# Patient Record
Sex: MALE | Race: BLACK OR AFRICAN AMERICAN | NOT HISPANIC OR LATINO | Employment: FULL TIME | URBAN - METROPOLITAN AREA
[De-identification: names, ages, dates, MRNs, and addresses within clinical notes are randomized per-mention and may not be internally consistent; named-entity substitution may affect disease eponyms.]

---

## 2021-07-16 ENCOUNTER — HISTORICAL (OUTPATIENT)
Dept: ADMINISTRATIVE | Facility: HOSPITAL | Age: 31
End: 2021-07-16

## 2022-03-29 ENCOUNTER — HISTORICAL (OUTPATIENT)
Dept: ADMINISTRATIVE | Facility: HOSPITAL | Age: 32
End: 2022-03-29

## 2022-04-11 ENCOUNTER — HISTORICAL (OUTPATIENT)
Dept: ADMINISTRATIVE | Facility: HOSPITAL | Age: 32
End: 2022-04-11

## 2022-04-18 ENCOUNTER — HISTORICAL (OUTPATIENT)
Dept: ADMINISTRATIVE | Facility: HOSPITAL | Age: 32
End: 2022-04-18

## 2022-04-28 VITALS — HEIGHT: 71 IN | WEIGHT: 227.94 LBS | BODY MASS INDEX: 31.91 KG/M2

## 2022-06-13 ENCOUNTER — HOSPITAL ENCOUNTER (EMERGENCY)
Facility: HOSPITAL | Age: 32
Discharge: HOME | End: 2022-06-13
Attending: EMERGENCY MEDICINE

## 2022-06-13 VITALS
TEMPERATURE: 99 F | HEART RATE: 80 BPM | DIASTOLIC BLOOD PRESSURE: 85 MMHG | RESPIRATION RATE: 18 BRPM | OXYGEN SATURATION: 100 % | SYSTOLIC BLOOD PRESSURE: 135 MMHG

## 2022-06-13 DIAGNOSIS — M76.61 ACHILLES TENDINITIS, RIGHT LEG: Primary | ICD-10-CM

## 2022-06-13 DIAGNOSIS — M79.673 HEEL PAIN: ICD-10-CM

## 2022-06-13 PROCEDURE — 99284 EMERGENCY DEPT VISIT MOD MDM: CPT | Mod: 25

## 2022-06-13 RX ORDER — HYDROCODONE BITARTRATE AND ACETAMINOPHEN 10; 325 MG/1; MG/1
1 TABLET ORAL EVERY 6 HOURS PRN
Qty: 20 TABLET | Refills: 0 | Status: SHIPPED | OUTPATIENT
Start: 2022-06-13 | End: 2022-06-18

## 2022-06-13 RX ORDER — MELOXICAM 7.5 MG/1
7.5 TABLET ORAL DAILY
Qty: 14 TABLET | Refills: 0 | Status: SHIPPED | OUTPATIENT
Start: 2022-06-13 | End: 2022-06-27

## 2022-06-13 NOTE — Clinical Note
"Figueroa Downeylucas Murcia was seen and treated in our emergency department on 6/13/2022.  He may return to work on 06/16/2022.       If you have any questions or concerns, please don't hesitate to call.      Silas Gonzalez RN    "

## 2022-06-13 NOTE — ED PROVIDER NOTES
Encounter Date: 6/13/2022       History     Chief Complaint   Patient presents with    Ankle Pain     Pt c/o pain to posterior ankle onset yesterday while playing basketball. States has limited flexion and extension of joint.     The history is provided by the patient. No  was used.   Ankle Pain  The current episode started yesterday. The problem occurs constantly. The problem has been gradually worsening. Pertinent negatives include no chest pain and no shortness of breath. The symptoms are aggravated by standing and walking. The symptoms are relieved by lying down.   Started hurting while playing basketball yesterday.    Review of patient's allergies indicates:  No Known Allergies  No past medical history on file. none  No past surgical history on file. none  No family history on file.     Review of Systems   Constitutional: Negative for fever.   HENT: Negative for sore throat.    Respiratory: Negative for shortness of breath.    Cardiovascular: Negative for chest pain.   Gastrointestinal: Negative for nausea.   Genitourinary: Negative for dysuria.   Musculoskeletal: Negative for back pain.   Skin: Negative for rash.   Neurological: Negative for weakness.   Hematological: Does not bruise/bleed easily.   All other systems reviewed and are negative.      Physical Exam     Initial Vitals   BP Pulse Resp Temp SpO2   -- -- -- -- --      MAP       --         Physical Exam    Nursing note and vitals reviewed.  Constitutional: He appears well-developed and well-nourished.   HENT:   Head: Normocephalic and atraumatic.   Right Ear: External ear normal.   Left Ear: External ear normal.   Nose: Nose normal.   Eyes: Conjunctivae and EOM are normal. Pupils are equal, round, and reactive to light.   Neck: Neck supple.   Normal range of motion.  Cardiovascular: Normal rate, regular rhythm, normal heart sounds and intact distal pulses.   Pulmonary/Chest: Breath sounds normal.   Abdominal: Abdomen is soft.  Bowel sounds are normal.   Musculoskeletal:      Cervical back: Normal range of motion and neck supple.        Legs:       Comments: TTP at insertion of right Achilles tendon; Aquino test negative     Neurological: He is alert and oriented to person, place, and time. He has normal strength. GCS score is 15. GCS eye subscore is 4. GCS verbal subscore is 5. GCS motor subscore is 6.   Skin: Skin is warm and dry. Capillary refill takes less than 2 seconds.   Psychiatric: He has a normal mood and affect. His behavior is normal. Judgment and thought content normal.         ED Course   Procedures  Labs Reviewed - No data to display       Imaging Results          X-Ray Foot Complete Right (Preliminary result)  Result time 06/13/22 11:32:29    ED Interpretation by Grabiel Velázquez MD (06/13/22 11:32:29, [facility] - Emergency Dept, Emergency Medicine)    Unusual appearance of posterior calcaneus; no definite fracture                               Medications - No data to display                       Clinical Impression:   Final diagnoses:  [M79.673] Heel pain  [M76.61] Achilles tendinitis, right leg (Primary)          ED Disposition Condition    Discharge Stable        ED Prescriptions     Medication Sig Dispense Start Date End Date Auth. Provider    meloxicam (MOBIC) 7.5 MG tablet Take 1 tablet (7.5 mg total) by mouth once daily. for 14 days 14 tablet 6/13/2022 6/27/2022 Grabiel Velázquez MD    HYDROcodone-acetaminophen (NORCO)  mg per tablet Take 1 tablet by mouth every 6 (six) hours as needed for Pain. 20 tablet 6/13/2022 6/18/2022 Grabiel Velázquez MD        Follow-up Information     Follow up With Specialties Details Why Contact Info    Follow up with your primary care provider in 2 weeks if not improved               Grabiel Velázquez MD  06/13/22 1111

## 2022-06-13 NOTE — ED TRIAGE NOTES
Pt c/o pain to posterior ankle onset yesterday while playing basketball. States has limited flexion and extension of joint.

## 2022-07-18 ENCOUNTER — HOSPITAL ENCOUNTER (EMERGENCY)
Facility: HOSPITAL | Age: 32
Discharge: HOME | End: 2022-07-18
Attending: STUDENT IN AN ORGANIZED HEALTH CARE EDUCATION/TRAINING PROGRAM

## 2022-07-18 VITALS
OXYGEN SATURATION: 100 % | TEMPERATURE: 99 F | WEIGHT: 220 LBS | SYSTOLIC BLOOD PRESSURE: 159 MMHG | BODY MASS INDEX: 30.8 KG/M2 | HEIGHT: 71 IN | RESPIRATION RATE: 18 BRPM | DIASTOLIC BLOOD PRESSURE: 97 MMHG | HEART RATE: 79 BPM

## 2022-07-18 DIAGNOSIS — F10.920 ALCOHOLIC INTOXICATION WITHOUT COMPLICATION: ICD-10-CM

## 2022-07-18 DIAGNOSIS — S41.112A ARM LACERATION, LEFT, INITIAL ENCOUNTER: Primary | ICD-10-CM

## 2022-07-18 PROBLEM — F43.0 ACUTE STRESS REACTION: Status: ACTIVE | Noted: 2022-07-18

## 2022-07-18 PROBLEM — T71.132A: Status: ACTIVE | Noted: 2022-07-18

## 2022-07-18 PROCEDURE — 12032 INTMD RPR S/A/T/EXT 2.6-7.5: CPT

## 2022-07-18 PROCEDURE — 99282 EMERGENCY DEPT VISIT SF MDM: CPT | Mod: 25

## 2022-07-18 RX ORDER — LIDOCAINE HYDROCHLORIDE 10 MG/ML
10 INJECTION, SOLUTION EPIDURAL; INFILTRATION; INTRACAUDAL; PERINEURAL
Status: ACTIVE | OUTPATIENT
Start: 2022-07-18 | End: 2022-07-18

## 2022-07-18 NOTE — SUBJECTIVE & OBJECTIVE
Patient information was obtained from patient and ER records.  Patient presented not under certificate to the Emergency Department by ambulance where the patient received N/A prior to arrival.    Psychiatric History:   Hospitalization: No  Medication Trials: No  Suicide Attempts: no  Violence: No  Depression: No  Kellen: No  AH's: No  Delusions: No    Past Medical History:   Past Medical History:   Diagnosis Date    At risk for alteration in comfort     Obesity, unspecified     Tobacco abuse         Seizures: No  Head trauma/l.o.c.: No  Wish to become pregnant[if female of childbearing age]: N/A    Allergies: NKDA  Review of patient's allergies indicates:  No Known Allergies    Medications in ER:   Medications   LIDOcaine (PF) 10 mg/ml (1%) injection 100 mg (has no administration in time range)       Medications at home: N/A    Substance Abuse History:   Alchohol: Binge drinks  Drug: Denies     Legal History:   Past charges/incarcerations: Denies  Pending charges: Denie; recent ticket for issue with his vehicle.    Family Psychiatric History: Unknown    Social History:   History of Physical/Sexual Abuse: No  Education: Left HS in 12th grade    Employment/Disability: Works at [employer]   Financial: Employed  Relationship Status/Sexual Orientation: Heterosexual   Children: 1   Housing Status: Lives alone at his home  Islam: N/A   History: None   Recreational Activities: Hangs with friends  Access to Gun: No     Review of Systems   Constitutional: Negative.    HENT: Negative.     Eyes: Negative.    Respiratory: Negative.     Cardiovascular: Negative.    Gastrointestinal: Negative.    Endocrine: Negative.    Genitourinary: Negative.    Musculoskeletal: Negative.    Skin:  Positive for wound.   Allergic/Immunologic: Negative.    Neurological: Negative.    Hematological: Negative.    Psychiatric/Behavioral:  Positive for self-injury. Negative for confusion, decreased concentration, dysphoric mood, hallucinations,  sleep disturbance and suicidal ideas. The patient is not nervous/anxious.    Objective:   Vitals:   Vitals:    07/18/22 1400   BP: (!) 145/93   Pulse: 63   Resp:    Temp:        Physical Exam    Psychiatric  Level of Consciousness: alert  Orientation: oriented to person, place and time  Grooming: in hospital gown  Psychomotor Behavior: no agitation  Speech: normal in rate, rhythm and volume  Language: uses words appropriately  Mood: Euthymic, remorseful  Affect: Normal  Thought Process: Goal directed  Associations: No delusions  Thought Content: No AVH.  No delusional thinking.  No SI or HI.  Memory: Intact, immediate, remote, and recent  Attention: intact to interview  Fund of Knowledge: appears adequate  Insight: Good  Judgement: Poor    Relevant Elements of Neurological Exam: no abnormality of posture noted    Laboratory Data: Labs Reviewed - No data to display

## 2022-07-18 NOTE — DISCHARGE INSTRUCTIONS
Do not drink alcohol.     You will need your stitches out in 7-10 days.     Return to the emergency department if any worsening pain, nausea, vomiting, redness, swelling, fever, or any other symptoms.

## 2022-07-18 NOTE — Clinical Note
"Figueroa Downeylucas Murcia was seen and treated in our emergency department on 7/18/2022.  He may return to work on 07/19/2022.       If you have any questions or concerns, please don't hesitate to call.      KASIA Rivera RN    "

## 2022-07-18 NOTE — CONSULTS
"[facility]  Psychiatry    Please see previous notes:    Visit was in person in the ED.      The chief complaint leading to psychiatric consultation is: "I had too much alcohol to drink."  This consultation was requested by the Emergency Department attending physician.    The patient location is  [facility] EMERGENCY DEPARTMENT     Patient Identification:   Figueroa Murcia is a 32 y.o. male.    Patient information was obtained from patient and ER records.  Patient presented voluntarily to the Emergency Department by ambulance where the patient received N/A prior to arrival.    Inpatient consult to Telemedicine - Psychiatry  Consult performed by: GUILLERMO Briscoe  Consult ordered by: Igor Marte MD  Assessment/Recommendations: Psychiatry Recommendations:  1.  No PEC required at this time.  2.  Patient to be discharged to home to f/u with [facility] this Thursday and he should also establish care with [facility] this week.  3.  I spoke to Mr. Murcia about impulsive choices and decisions.  Recommended that he needs coping skills.  4.  Encouraged him to return to ED for any SI.  5.  He will be staying at his cousin Mentcle's house post discharge.        Teleconsult Time Documentation  Subjective:     History of Present Illness:  32 year old AA male presented to ED via ambulance after he self harmed by cutting his left upper arm that required sutures.  Mr. Murcia reports that he had a horrible day that day.  He says that his truch broke and he also got a ticket right before his truck broke.  He reports that he had been drinking heavily that day worrying about how he was going to get his truch fixed since that is what he uses to go to work.  He says that he went to a party and drank Abigail and Tequila.  He says that he mixed a lot of liquor that day.  He says that by the time he got home, he was still drinking and cut himself.  He says that he was not trying to kill himself nor does he have " any desires to be dead.  He denies HI.  I spoke with his cousin, Calderon, with Figueroa's permission.  Calderon said that Figueroa actually called him and told him what he did.  He said that he made it to his house and called EMS.  Calderon says that he does not ever recall Figueroa having any emotional issues or mental illness.  He says that he has never done anything remotely close to this before.  Figueroa denies a hx of depression, anxiety, bipolar, impulse control.  He says that it was the excessive alcohol that made him act out.  He says that he is now afraid to drink because of what he did.  On today, he denies depression.  He denies SI or HI.  He denies anxiety.  He says that he knows that he made a very stupid decision in what he did.  He advised me that he does not have any desire to drink.  He denies AVH and delusional thinking.  He says that he plans on going back to work tomorrow. He says that he has an appt at [facility] this Thursday and he will keep his appt.       Psychiatric History:   Previous Psychiatric Hospitalizations: No   Previous Medication Trials: No   Previous Suicide Attempts: no   History of Violence: No  History of Depression: No  History of Kellen: No  History of Auditory/Visual Hallucination No  History of Delusions: No  Outpatient psychiatrist (current & past): No    Substance Abuse History:  Tobacco:Yes  Alcohol: Yes  Illicit Substances:No  Detox/Rehab: No    Legal History: Past charges/incarcerations: Tickets     Family Psychiatric History: No known      Social History:  Developmental/Childhood:Achieved all developmental milestones timely  *Education:Left school in 12th grade  Employment Status/Finances:Employed   Relationship Status/Sexual Orientation: Single:  Not currently in a relationship  Children: 1  Housing Status: Home    history:  NO  Access to gun: NO  Confucianist:N/A  Recreational activities:Time with family    Psychiatric Mental Status Exam:  Arousal: alert  Sensorium/Orientation: oriented  "to grossly intact  Behavior/Cooperation: normal, cooperative   Speech: normal tone, normal rate, normal pitch, normal volume  Language: grossly intact  Mood: " Euthymic, remorseful "   Affect: appropriate  Thought Process: normal and logical  Thought Content:   Auditory hallucinations: NO  Visual hallucinations: NO  Paranoia: NO  Delusions:  NO  Suicidal ideation: NO  Homicidal ideation: NO  Attention/Concentration:  intact  Memory:    Recent:  Intact   Remote: Intact   3/3 immediate, 3/3 at 5 min  Fund of Knowledge: Aware of current events   Abstract reasoning: proverbs were abstract  Insight: intact  Judgment: behavior is adequate to circumstances      Past Medical History:   Past Medical History:   Diagnosis Date    At risk for alteration in comfort     Obesity, unspecified     Tobacco abuse       Laboratory Data: Labs Reviewed - No data to display    Neurological History:  Seizures: No  Head trauma: No    Allergies: NKDA  Review of patient's allergies indicates:  No Known Allergies    Medications in ER:   Medications   LIDOcaine (PF) 10 mg/ml (1%) injection 100 mg (has no administration in time range)       Medications at home: N/A    Patient information was obtained from patient and ER records.  Patient presented not under certificate to the Emergency Department by ambulance where the patient received N/A prior to arrival.    Psychiatric History:   Hospitalization: No  Medication Trials: No  Suicide Attempts: no  Violence: No  Depression: No  Kellen: No  AH's: No  Delusions: No    Past Medical History:   Past Medical History:   Diagnosis Date    At risk for alteration in comfort     Obesity, unspecified     Tobacco abuse         Seizures: No  Head trauma/l.o.c.: No  Wish to become pregnant[if female of childbearing age]: N/A    Allergies: NKDA  Review of patient's allergies indicates:  No Known Allergies    Medications in ER:   Medications   LIDOcaine (PF) 10 mg/ml (1%) injection 100 mg (has no " administration in time range)       Medications at home: N/A    Substance Abuse History:   Alchohol: Binge drinks  Drug: Denies     Legal History:   Past charges/incarcerations: Denies  Pending charges: Denie; recent ticket for issue with his vehicle.    Family Psychiatric History: Unknown    Social History:   History of Physical/Sexual Abuse: No  Education: Left HS in 12th grade    Employment/Disability: Works at [employer]   Financial: Employed  Relationship Status/Sexual Orientation: Heterosexual   Children: 1   Housing Status: Lives alone at his home  Jainism: N/A   History: None   Recreational Activities: Hangs with friends  Access to Gun: No     Review of Systems   Constitutional: Negative.    HENT: Negative.     Eyes: Negative.    Respiratory: Negative.     Cardiovascular: Negative.    Gastrointestinal: Negative.    Endocrine: Negative.    Genitourinary: Negative.    Musculoskeletal: Negative.    Skin:  Positive for wound.   Allergic/Immunologic: Negative.    Neurological: Negative.    Hematological: Negative.    Psychiatric/Behavioral:  Positive for self-injury. Negative for confusion, decreased concentration, dysphoric mood, hallucinations, sleep disturbance and suicidal ideas. The patient is not nervous/anxious.    Objective:   Vitals:   Vitals:    07/18/22 1400   BP: (!) 145/93   Pulse: 63   Resp:    Temp:        Physical Exam    Psychiatric  Level of Consciousness: alert  Orientation: oriented to person, place and time  Grooming: in hospital gown  Psychomotor Behavior: no agitation  Speech: normal in rate, rhythm and volume  Language: uses words appropriately  Mood: Euthymic, remorseful  Affect: Normal  Thought Process: Goal directed  Associations: No delusions  Thought Content: No AVH.  No delusional thinking.  No SI or HI.  Memory: Intact, immediate, remote, and recent  Attention: intact to interview  Fund of Knowledge: appears adequate  Insight: Good  Judgement: Poor    Relevant Elements of  Neurological Exam: no abnormality of posture noted    Laboratory Data: Labs Reviewed - No data to display      Assessment - Diagnosis - Goals:     Diagnosis/Impression: Acute Stress Reaction; Self harm    Rec: Psychiatry Recommendations:  1.  No PEC required at this time.  2.  Patient to be discharged to home to f/u with [facility] this Thursday and he should also establish care with [facility] this week.  3.  I spoke to Mr. Murcia about impulsive choices and decisions.  Recommended that he needs coping skills.  4.  Encouraged him to return to ED for any SI.  5.  He will be going to his cousin, rao Mancera post discharge; confirmed this with Calderon.     Time with patient: 60 minutes      More than 50% of the time was spent counseling/coordinating care    Consulting clinician was informed of the encounter and consult note.    SUHAIL Briscoe-ARMIDA   Psychiatry  [facility]

## 2022-07-18 NOTE — PROGRESS NOTES
Dr. Marte asked for an update on what time patient will be evaluated by [facility]. Jodi Andrade said that she would check with the NP and let me know.

## 2022-07-18 NOTE — HPI
32 year old AA male presented to ED via ambulance after he self harmed by cutting his left upper arm that required sutures.  Mr. Murcia reports that he had a horrible day that day.  He says that his truch broke and he also got a ticket right before his truck broke.  He reports that he had been drinking heavily that day worrying about how he was going to get his truch fixed since that is what he uses to go to work.  He says that he went to a party and drank Abigail and Tequila.  He says that he mixed a lot of liquor that day.  He says that by the time he got home, he was still drinking and cut himself.  He says that he was not trying to kill himself nor does he have any desires to be dead.  He denies HI.  I spoke with his cousin, Calderon, with Figueroa's permission.  Calderon said that Figueroa actually called him and told him what he did.  He said that he made it to his house and called EMS.  Calderon says that he does not ever recall Figueroa having any emotional issues or mental illness.  He says that he has never done anything remotely close to this before.  Figueroa denies a hx of depression, anxiety, bipolar, impulse control.  He says that it was the excessive alcohol that made him act out.  He says that he is now afraid to drink because of what he did.  On today, he denies depression.  He denies SI or HI.  He denies anxiety.  He says that he knows that he made a very stupid decision in what he did.  He advised me that he does not have any desire to drink.  He denies AVH and delusional thinking.  He says that he plans on going back to work tomorrow. He says that he has an appt at [facility] this Thursday and he will keep his appt.
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
## 2022-07-21 ENCOUNTER — OFFICE VISIT (OUTPATIENT)
Dept: INTERNAL MEDICINE | Facility: CLINIC | Age: 32
End: 2022-07-21

## 2022-07-21 VITALS
BODY MASS INDEX: 32.2 KG/M2 | TEMPERATURE: 98 F | HEART RATE: 71 BPM | HEIGHT: 71 IN | RESPIRATION RATE: 16 BRPM | WEIGHT: 230 LBS | SYSTOLIC BLOOD PRESSURE: 132 MMHG | DIASTOLIC BLOOD PRESSURE: 82 MMHG

## 2022-07-21 DIAGNOSIS — Z11.59 NEED FOR HEPATITIS C SCREENING TEST: ICD-10-CM

## 2022-07-21 DIAGNOSIS — Z00.00 WELLNESS EXAMINATION: Primary | ICD-10-CM

## 2022-07-21 DIAGNOSIS — Z11.4 SCREENING FOR HIV (HUMAN IMMUNODEFICIENCY VIRUS): ICD-10-CM

## 2022-07-21 DIAGNOSIS — F17.210 CIGARETTE NICOTINE DEPENDENCE WITHOUT COMPLICATION: ICD-10-CM

## 2022-07-21 DIAGNOSIS — R45.4 IRRITABILITY AND ANGER: Chronic | ICD-10-CM

## 2022-07-21 DIAGNOSIS — Z11.3 SCREENING FOR STD (SEXUALLY TRANSMITTED DISEASE): ICD-10-CM

## 2022-07-21 PROCEDURE — 99203 OFFICE O/P NEW LOW 30 MIN: CPT | Mod: S$PBB,,, | Performed by: NURSE PRACTITIONER

## 2022-07-21 PROCEDURE — 99214 OFFICE O/P EST MOD 30 MIN: CPT | Mod: PBBFAC | Performed by: NURSE PRACTITIONER

## 2022-07-21 PROCEDURE — 99203 PR OFFICE/OUTPT VISIT, NEW, LEVL III, 30-44 MIN: ICD-10-PCS | Mod: S$PBB,,, | Performed by: NURSE PRACTITIONER

## 2022-07-21 RX ORDER — PROMETHAZINE HYDROCHLORIDE AND DEXTROMETHORPHAN HYDROBROMIDE 6.25; 15 MG/5ML; MG/5ML
5 SYRUP ORAL EVERY 6 HOURS PRN
COMMUNITY
Start: 2022-04-18 | End: 2022-07-21 | Stop reason: ALTCHOICE

## 2022-07-21 NOTE — PROGRESS NOTES
SUHAIL Crowder   [facility]  [address] [facility] [address]      PATIENT NAME: Figueroa Murcia  : 1990  DATE: 22  MRN: [id number]      Billing Provider: SUHAIL Crowder  Level of Service:   Patient PCP Information     Provider PCP Type    SUHAIL Crowder General          Reason for Visit / Chief Complaint: Establish Care       History of Present Illness / Problem Focused Workflow     Figueroa Murcia presents to the clinic with Establish Care     33 yo AAM here to establish care. PMH Anger Issues, THC Use, & tobacco use (4 ciggs / day). Pt chart review does show some anger issues with violent acts  / self harm noted. The pt reprots today with no SI/HI or thoughts of harming himself, he reports he is going to [facility] after our OV for anger management. Reports with some right achilles tenderness at time when he tries to run, injured it about 3 months ago, discussed therapies to help. Pt denies any other acute issues today, he is agreeable to Wellness labs today and f/u next week.       Review of Systems     Review of Systems   Constitutional: Negative.    HENT: Negative.    Eyes: Negative.    Respiratory: Negative.    Cardiovascular: Negative.    Gastrointestinal: Negative.    Endocrine: Negative.    Genitourinary: Negative.    Neurological: Negative.    Psychiatric/Behavioral: Negative.        Medical / Social / Family History     Past Medical History:   Diagnosis Date    At risk for alteration in comfort     Obesity, unspecified     Tobacco abuse        Past Surgical History:   Procedure Laterality Date    arm repair Right        Social History    reports that he has been smoking cigarettes. He has never used smokeless tobacco. He reports current alcohol use. He reports current drug use. Drug: Marijuana.    Family History  's family history includes Hypertension in his mother; No Known Problems in his  father.    Medications and Allergies     Medications  Medication List with Changes/Refills   Current Medications    PROMETHAZINE-DEXTROMETHORPHAN (PROMETHAZINE-DM) 6.25-15 MG/5 ML SYRP    Take 5 mLs by mouth every 6 (six) hours as needed.        Allergies  Review of patient's allergies indicates:  No Known Allergies    Physical Examination     Vitals:    07/21/22 0925   BP: 132/82   Pulse: 71   Resp: 16   Temp: 97.7 °F (36.5 °C)     Physical Exam  Vitals reviewed.   Constitutional:       Appearance: Normal appearance. He is normal weight.   HENT:      Head: Normocephalic.   Cardiovascular:      Rate and Rhythm: Normal rate and regular rhythm.      Pulses: Normal pulses.      Heart sounds: Normal heart sounds.   Pulmonary:      Effort: Pulmonary effort is normal.      Breath sounds: Normal breath sounds.   Abdominal:      General: Abdomen is flat.      Palpations: Abdomen is soft.   Musculoskeletal:         General: Normal range of motion.      Cervical back: Normal range of motion.   Skin:     General: Skin is warm and dry.   Neurological:      Mental Status: He is alert.   Psychiatric:         Mood and Affect: Mood normal.           Results         Assessment and Plan (including Health Maintenance)     Plan:         Health Maintenance Due   Topic Date Due    Hepatitis C Screening  Never done    Lipid Panel  Never done    COVID-19 Vaccine (1) Never done    Pneumococcal Vaccines (Age 0-64) (1 - PCV) Never done    HIV Screening  Never done    TETANUS VACCINE  Never done       Problem List Items Addressed This Visit    None     Visit Diagnoses     Wellness examination    -  Primary          Health Maintenance Topics with due status: Not Due       Topic Last Completion Date    Influenza Vaccine 11/12/2009       No future appointments.         Signature:     [facility]  [address] [facility] [address]    Date of encounter: 7/21/22

## 2022-07-21 NOTE — ASSESSMENT & PLAN NOTE
Smoking cessation discussed >3 mins  Pt ready to quit.  Referral to Smoking Cessation   Discussed benefits of quitting including improved health, decreased cardiac/vascular/pulmonary/stroke risks as well as saving money.

## 2022-07-27 ENCOUNTER — HOSPITAL ENCOUNTER (EMERGENCY)
Facility: HOSPITAL | Age: 32
Discharge: HOME | End: 2022-07-27
Attending: STUDENT IN AN ORGANIZED HEALTH CARE EDUCATION/TRAINING PROGRAM

## 2022-07-27 ENCOUNTER — OFFICE VISIT (OUTPATIENT)
Dept: INTERNAL MEDICINE | Facility: CLINIC | Age: 32
End: 2022-07-27

## 2022-07-27 VITALS
SYSTOLIC BLOOD PRESSURE: 136 MMHG | OXYGEN SATURATION: 96 % | DIASTOLIC BLOOD PRESSURE: 71 MMHG | BODY MASS INDEX: 32.93 KG/M2 | WEIGHT: 230 LBS | RESPIRATION RATE: 18 BRPM | HEART RATE: 69 BPM | TEMPERATURE: 98 F | HEIGHT: 70 IN

## 2022-07-27 DIAGNOSIS — Z00.00 WELLNESS EXAMINATION: ICD-10-CM

## 2022-07-27 DIAGNOSIS — Z48.02 VISIT FOR SUTURE REMOVAL: Primary | ICD-10-CM

## 2022-07-27 DIAGNOSIS — E55.9 VITAMIN D DEFICIENCY: Primary | ICD-10-CM

## 2022-07-27 PROCEDURE — 99281 EMR DPT VST MAYX REQ PHY/QHP: CPT

## 2022-07-27 PROCEDURE — 99214 OFFICE O/P EST MOD 30 MIN: CPT | Mod: 95,,, | Performed by: NURSE PRACTITIONER

## 2022-07-27 PROCEDURE — 99214 PR OFFICE/OUTPT VISIT, EST, LEVL IV, 30-39 MIN: ICD-10-PCS | Mod: 95,,, | Performed by: NURSE PRACTITIONER

## 2022-07-27 RX ORDER — ERGOCALCIFEROL 1.25 MG/1
50000 CAPSULE ORAL
Qty: 8 CAPSULE | Refills: 0 | Status: ON HOLD | OUTPATIENT
Start: 2022-07-27 | End: 2022-10-21 | Stop reason: ALTCHOICE
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
## 2022-07-27 NOTE — PROGRESS NOTES
SUHAIL Crowder   [facility]  [address] [facility] [address]      PATIENT NAME: Figueroa Murcia  : 1990  DATE: 22  MRN: [id number]      Billing Provider: SUHAIL Crowdre  Level of Service:   Patient PCP Information     Provider PCP Type    SUHAIL Crowder General          Reason for Visit / Chief Complaint: Follow-up (Lab review)       History of Present Illness / Problem Focused Workflow     Figueroa Murcia presents to the clinic with Follow-up (Lab review)     33 yo AAM here via audio virtual for f/u. PMH Anger Issues, THC Use, & tobacco use (4 ciggs / day). Followed by [facility]. Pt reports he has an appt with [facility] in September. Labs reviewed with no questions or concerns at this time, discussed low vitamin d level with the patient, is agreeable to RX vitamin D then starting OTC. Agreeable to 1 year f/u for wellness with labs. Denies any acute issues or concerns today.       Review of Systems     Review of Systems   Constitutional: Negative.    HENT: Negative.    Eyes: Negative.    Respiratory: Negative.    Cardiovascular: Negative.    Gastrointestinal: Negative.    Endocrine: Negative.    Genitourinary: Negative.    Neurological: Negative.    Psychiatric/Behavioral: Negative.        Medical / Social / Family History     Past Medical History:   Diagnosis Date    At risk for alteration in comfort     Obesity, unspecified     Tobacco abuse        Past Surgical History:   Procedure Laterality Date    arm repair Right        Social History    reports that he has been smoking cigarettes. He has never used smokeless tobacco. He reports current alcohol use. He reports current drug use. Drug: Marijuana.    Family History  's family history includes Hypertension in his mother; No Known Problems in his father.    Medications and Allergies     Medications        Allergies  Review of patient's allergies indicates:  No  Known Allergies    Physical Examination   There were no vitals filed for this visit.  Physical Exam  HENT:      Right Ear: Hearing normal.      Left Ear: Hearing normal.   Neurological:      Mental Status: He is alert and oriented to person, place, and time.   Psychiatric:         Mood and Affect: Mood normal.           Results     Lab Results   Component Value Date    WBC 8.2 07/21/2022    RBC 5.87 07/21/2022    HGB 17.0 07/21/2022    HCT 50.1 07/21/2022    MCV 85.3 07/21/2022    MCH 29.0 07/21/2022    MCHC 33.9 07/21/2022    RDW 11.9 07/21/2022     07/21/2022    MPV 9.7 07/21/2022     Sodium Level   Date Value Ref Range Status   07/21/2022 138 136 - 145 mmol/L Final     Potassium Level   Date Value Ref Range Status   07/21/2022 3.9 3.5 - 5.1 mmol/L Final     Carbon Dioxide   Date Value Ref Range Status   07/21/2022 32 (H) 22 - 29 mmol/L Final     Blood Urea Nitrogen   Date Value Ref Range Status   07/21/2022 10.2 8.9 - 20.6 mg/dL Final     Creatinine   Date Value Ref Range Status   07/21/2022 1.10 0.73 - 1.18 mg/dL Final     Calcium Level Total   Date Value Ref Range Status   07/21/2022 9.9 8.4 - 10.2 mg/dL Final     Albumin Level   Date Value Ref Range Status   07/21/2022 4.0 3.5 - 5.0 gm/dL Final     Bilirubin Total   Date Value Ref Range Status   07/21/2022 1.4 <=1.5 mg/dL Final     Alkaline Phosphatase   Date Value Ref Range Status   07/21/2022 72 40 - 150 unit/L Final     Aspartate Aminotransferase   Date Value Ref Range Status   07/21/2022 18 5 - 34 unit/L Final     Alanine Aminotransferase   Date Value Ref Range Status   07/21/2022 18 0 - 55 unit/L Final     Estimated GFR-Non    Date Value Ref Range Status   03/29/2022 >60       Lab Results   Component Value Date    CHOL 201 (H) 07/21/2022     Lab Results   Component Value Date    HDL 42 07/21/2022     No results found for: LDLCALC  Lab Results   Component Value Date    TRIG 93 07/21/2022     No results found for: CHOLHDL  Lab Results    Component Value Date    TSH 0.6284 07/21/2022     Lab Results   Component Value Date    PHUR 7.0 09/18/2021    PROTEINUA Negative 07/21/2022    GLUCUA Negative 09/18/2021    KETONESU Negative 09/18/2021    OCCULTUA Negative 09/18/2021    NITRITE Negative 09/18/2021    LEUKOCYTESUR Negative 07/21/2022     Lab Results   Component Value Date    HGBA1C 5.1 07/21/2022     No results found for: MICROALBUR, [id number]   No results found for this or any previous visit.         Assessment and Plan (including Health Maintenance)     Plan:         Health Maintenance Due   Topic Date Due    Pneumococcal Vaccines (Age 0-64) (1 - PCV) Never done    COVID-19 Vaccine (2 - Moderna series) 03/31/2022       Problem List Items Addressed This Visit    None         Health Maintenance Topics with due status: Not Due       Topic Last Completion Date    Influenza Vaccine 10/08/2012    TETANUS VACCINE 01/13/2022       No future appointments.         Signature:     [facility]  [address] [facility] [address]    Date of encounter: 7/27/22      Established Patient - Audio Only Telehealth Visit     The patient location is: home  The chief complaint leading to consultation is: f/u  Visit type: Virtual visit with audio only (telephone)  Total time spent with patient: 9 mins       The reason for the audio only service rather than synchronous audio and video virtual visit was related to technical difficulties or patient preference/necessity.     Each patient to whom I provide medical services by telemedicine is:  (1) informed of the relationship between the physician and patient and the respective role of any other health care provider with respect to management of the patient; and (2) notified that they may decline to receive medical services by telemedicine and may withdraw from such care at any time. Patient verbally consented to receive this service via voice-only telephone  call.         This service was not originating from a related E/M service provided within the previous 7 days nor will  to an E/M service or procedure within the next 24 hours or my soonest available appointment.  Prevailing standard of care was able to be met in this audio-only visit.

## 2022-08-10 ENCOUNTER — HOSPITAL ENCOUNTER (EMERGENCY)
Facility: HOSPITAL | Age: 32
Discharge: HOME | End: 2022-08-10
Attending: EMERGENCY MEDICINE

## 2022-08-10 VITALS
SYSTOLIC BLOOD PRESSURE: 136 MMHG | RESPIRATION RATE: 18 BRPM | DIASTOLIC BLOOD PRESSURE: 79 MMHG | HEIGHT: 71 IN | BODY MASS INDEX: 32.08 KG/M2 | HEART RATE: 70 BPM | OXYGEN SATURATION: 99 % | TEMPERATURE: 99 F

## 2022-08-10 DIAGNOSIS — J02.0 STREP PHARYNGITIS: Primary | ICD-10-CM

## 2022-08-10 DIAGNOSIS — L03.114 CELLULITIS OF LEFT ARM: ICD-10-CM

## 2022-08-10 LAB
FLUAV AG UPPER RESP QL IA.RAPID: NOT DETECTED
FLUBV AG UPPER RESP QL IA.RAPID: NOT DETECTED
SARS-COV-2 RNA RESP QL NAA+PROBE: NOT DETECTED
STREP A PCR (OHS): DETECTED

## 2022-08-10 PROCEDURE — 87631 RESP VIRUS 3-5 TARGETS: CPT | Performed by: EMERGENCY MEDICINE

## 2022-08-10 PROCEDURE — 99284 EMERGENCY DEPT VISIT MOD MDM: CPT | Mod: 25

## 2022-08-10 PROCEDURE — 87636 SARSCOV2 & INF A&B AMP PRB: CPT | Performed by: EMERGENCY MEDICINE

## 2022-08-10 RX ORDER — IBUPROFEN 800 MG/1
800 TABLET ORAL EVERY 8 HOURS PRN
Qty: 20 TABLET | Refills: 0 | Status: SHIPPED | OUTPATIENT
Start: 2022-08-10 | End: 2023-03-24

## 2022-08-10 RX ORDER — AMOXICILLIN AND CLAVULANATE POTASSIUM 875; 125 MG/1; MG/1
1 TABLET, FILM COATED ORAL 2 TIMES DAILY
Qty: 14 TABLET | Refills: 0 | Status: ON HOLD | OUTPATIENT
Start: 2022-08-10 | End: 2022-10-21 | Stop reason: ALTCHOICE

## 2022-08-10 NOTE — ED PROVIDER NOTES
Encounter Date: 8/10/2022       History     Chief Complaint   Patient presents with    Sore Throat     States has sore throat for 2 days and a sore on arm for 3 days     32-year-old male complains of a sore throat for 2 days.  No cough, runny nose, ear pain, chest pain.  He also complains of to bumps on his left inner arm which drained pus each morning present for the last week.        Review of patient's allergies indicates:  No Known Allergies  Past Medical History:   Diagnosis Date    At risk for alteration in comfort     Obesity, unspecified     Tobacco abuse      Past Surgical History:   Procedure Laterality Date    arm repair Right      Family History   Problem Relation Age of Onset    Hypertension Mother     No Known Problems Father      Social History     Tobacco Use    Smoking status: Current Every Day Smoker     Types: Cigarettes    Smokeless tobacco: Never Used   Substance Use Topics    Alcohol use: Yes    Drug use: Yes     Types: Marijuana     Review of Systems   HENT: Positive for sore throat.    Skin: Positive for wound.   All other systems reviewed and are negative.      Physical Exam     Initial Vitals [08/10/22 1110]   BP Pulse Resp Temp SpO2   136/79 70 18 98.6 °F (37 °C) 99 %      MAP       --         Physical Exam    Nursing note and vitals reviewed.  Constitutional: Vital signs are normal. He appears well-developed and well-nourished.   HENT:   Head: Normocephalic and atraumatic.   Right Ear: External ear normal.   Left Ear: External ear normal.   Pharyngeal erythema but no exudate or tonsillar swelling, uvula midline   Eyes: Pupils are equal, round, and reactive to light.   Neck: Neck supple.   Cardiovascular: Normal rate, regular rhythm and normal heart sounds.   Pulmonary/Chest: Breath sounds normal. No respiratory distress.   Abdominal: Abdomen is soft. He exhibits no distension. There is no abdominal tenderness.   Musculoskeletal:      Cervical back: Neck supple. No edema or  erythema.     Lymphadenopathy:     He has no cervical adenopathy.   Neurological: He is alert.   Skin: Skin is warm and dry. Capillary refill takes less than 2 seconds.   Two red areas about 1cm each, to medial left elbow with no fluctuance or drainage         ED Course   Procedures  Labs Reviewed   STREP GROUP A BY PCR - Abnormal; Notable for the following components:       Result Value    STREP A PCR ([facility]) Detected (*)     All other components within normal limits   COVID/FLU A&B PCR - Normal          Imaging Results    None          Medications - No data to display                       Clinical Impression:   Final diagnoses:  [J02.0] Strep pharyngitis (Primary)  [L03.114] Cellulitis of left arm          ED Disposition Condition    Discharge Stable        ED Prescriptions     Medication Sig Dispense Start Date End Date Auth. Provider    amoxicillin-clavulanate 875-125mg (AUGMENTIN) 875-125 mg per tablet Take 1 tablet by mouth 2 (two) times daily. 14 tablet 8/10/2022  Kyara Andrews MD    ibuprofen (ADVIL,MOTRIN) 800 MG tablet Take 1 tablet (800 mg total) by mouth every 8 (eight) hours as needed for Pain. 20 tablet 8/10/2022  Kyara Andrews MD        Follow-up Information     Follow up With Specialties Details Why Contact Info    SUHAIL Crowder Nurse Practitioner Schedule an appointment as soon as possible for a visit in 1 week  [address] [facility] [address] [phone]             Kyara Andrews MD  08/11/22 7523

## 2022-08-10 NOTE — Clinical Note
"Figueroa"Wesley Murcia was seen and treated in our emergency department on 8/10/2022.  He may return to work on 08/13/2022.       If you have any questions or concerns, please don't hesitate to call.      Kyara Andrews MD"

## 2022-08-24 ENCOUNTER — TELEPHONE (OUTPATIENT)
Dept: SMOKING CESSATION | Facility: CLINIC | Age: 32
End: 2022-08-24

## 2022-08-24 NOTE — TELEPHONE ENCOUNTER
Pt had not shown up for his [facility] appointment.  Called and spoke to pt.  Pt will call back to reschedule when he is ready.

## 2022-10-19 ENCOUNTER — HOSPITAL ENCOUNTER (EMERGENCY)
Facility: HOSPITAL | Age: 32
Discharge: HOME | End: 2022-10-19
Attending: INTERNAL MEDICINE

## 2022-10-19 VITALS
TEMPERATURE: 98 F | HEIGHT: 71 IN | WEIGHT: 220 LBS | SYSTOLIC BLOOD PRESSURE: 143 MMHG | HEART RATE: 68 BPM | RESPIRATION RATE: 19 BRPM | DIASTOLIC BLOOD PRESSURE: 53 MMHG | BODY MASS INDEX: 30.8 KG/M2 | OXYGEN SATURATION: 97 %

## 2022-10-19 DIAGNOSIS — R11.14 BILIOUS VOMITING WITH NAUSEA: ICD-10-CM

## 2022-10-19 DIAGNOSIS — R10.9 ABDOMINAL CRAMPING: Primary | ICD-10-CM

## 2022-10-19 LAB
ALBUMIN SERPL-MCNC: 4 GM/DL (ref 3.5–5)
ALBUMIN/GLOB SERPL: 1.2 RATIO (ref 1.1–2)
ALP SERPL-CCNC: 61 UNIT/L (ref 40–150)
ALT SERPL-CCNC: 24 UNIT/L (ref 0–55)
AMPHET UR QL SCN: POSITIVE
APPEARANCE UR: CLEAR
AST SERPL-CCNC: 24 UNIT/L (ref 5–34)
BARBITURATE SCN PRESENT UR: NEGATIVE
BASOPHILS # BLD AUTO: 0.05 X10(3)/MCL (ref 0–0.2)
BASOPHILS NFR BLD AUTO: 0.4 %
BENZODIAZ UR QL SCN: NEGATIVE
BILIRUB UR QL STRIP.AUTO: NEGATIVE MG/DL
BILIRUBIN DIRECT+TOT PNL SERPL-MCNC: 0.5 MG/DL
BUN SERPL-MCNC: 10.1 MG/DL (ref 8.9–20.6)
CALCIUM SERPL-MCNC: 9.8 MG/DL (ref 8.4–10.2)
CANNABINOIDS UR QL SCN: POSITIVE
CHLORIDE SERPL-SCNC: 104 MMOL/L (ref 98–107)
CK SERPL-CCNC: 533 U/L (ref 30–200)
CO2 SERPL-SCNC: 31 MMOL/L (ref 22–29)
COCAINE UR QL SCN: NEGATIVE
COLOR UR AUTO: YELLOW
CREAT SERPL-MCNC: 0.95 MG/DL (ref 0.73–1.18)
EOSINOPHIL # BLD AUTO: 0.27 X10(3)/MCL (ref 0–0.9)
EOSINOPHIL NFR BLD AUTO: 2 %
ERYTHROCYTE [DISTWIDTH] IN BLOOD BY AUTOMATED COUNT: 12.1 % (ref 11.5–17)
GFR SERPLBLD CREATININE-BSD FMLA CKD-EPI: >60 MLS/MIN/1.73/M2
GLOBULIN SER-MCNC: 3.4 GM/DL (ref 2.4–3.5)
GLUCOSE SERPL-MCNC: 89 MG/DL (ref 74–100)
GLUCOSE UR QL STRIP.AUTO: NEGATIVE MG/DL
HCT VFR BLD AUTO: 45.7 % (ref 42–52)
HGB BLD-MCNC: 15.5 GM/DL (ref 14–18)
IMM GRANULOCYTES # BLD AUTO: 0.05 X10(3)/MCL (ref 0–0.04)
IMM GRANULOCYTES NFR BLD AUTO: 0.4 %
INR BLD: 0.92 (ref 2–3)
KETONES UR QL STRIP.AUTO: NEGATIVE MG/DL
LEUKOCYTE ESTERASE UR QL STRIP.AUTO: NEGATIVE UNIT/L
LIPASE SERPL-CCNC: 33 U/L
LYMPHOCYTES # BLD AUTO: 2.89 X10(3)/MCL (ref 0.6–4.6)
LYMPHOCYTES NFR BLD AUTO: 21.2 %
MAGNESIUM SERPL-MCNC: 1.8 MG/DL (ref 1.6–2.6)
MCH RBC QN AUTO: 29 PG (ref 27–31)
MCHC RBC AUTO-ENTMCNC: 33.9 MG/DL (ref 33–36)
MCV RBC AUTO: 85.6 FL (ref 80–94)
MDMA UR QL SCN: NEGATIVE
MONOCYTES # BLD AUTO: 0.98 X10(3)/MCL (ref 0.1–1.3)
MONOCYTES NFR BLD AUTO: 7.2 %
NEUTROPHILS # BLD AUTO: 9.4 X10(3)/MCL (ref 2.1–9.2)
NEUTROPHILS NFR BLD AUTO: 68.8 %
NITRITE UR QL STRIP.AUTO: NEGATIVE
NRBC BLD AUTO-RTO: 0 %
OPIATES UR QL SCN: NEGATIVE
PCP UR QL: NEGATIVE
PH UR STRIP.AUTO: 6.5 [PH]
PH UR: 6.5 [PH] (ref 3–11)
PLATELET # BLD AUTO: 201 X10(3)/MCL (ref 130–400)
PMV BLD AUTO: 9.7 FL (ref 7.4–10.4)
POTASSIUM SERPL-SCNC: 3.9 MMOL/L (ref 3.5–5.1)
PROT SERPL-MCNC: 7.4 GM/DL (ref 6.4–8.3)
PROT UR QL STRIP.AUTO: NEGATIVE MG/DL
PROTHROMBIN TIME: 12.2 SECONDS (ref 11.7–14.5)
RBC # BLD AUTO: 5.34 X10(6)/MCL (ref 4.7–6.1)
RBC UR QL AUTO: NEGATIVE UNIT/L
SODIUM SERPL-SCNC: 143 MMOL/L (ref 136–145)
SP GR UR STRIP.AUTO: >=1.03
SPECIFIC GRAVITY, URINE AUTO (.000) (OHS): <1.03 (ref 1–1.03)
UROBILINOGEN UR STRIP-ACNC: 0.2 MG/DL
WBC # SPEC AUTO: 13.6 X10(3)/MCL (ref 4.5–11.5)

## 2022-10-19 PROCEDURE — 80053 COMPREHEN METABOLIC PANEL: CPT | Performed by: INTERNAL MEDICINE

## 2022-10-19 PROCEDURE — 83735 ASSAY OF MAGNESIUM: CPT | Performed by: INTERNAL MEDICINE

## 2022-10-19 PROCEDURE — 85025 COMPLETE CBC W/AUTO DIFF WBC: CPT | Performed by: INTERNAL MEDICINE

## 2022-10-19 PROCEDURE — 80307 DRUG TEST PRSMV CHEM ANLYZR: CPT | Performed by: INTERNAL MEDICINE

## 2022-10-19 PROCEDURE — 82550 ASSAY OF CK (CPK): CPT | Performed by: INTERNAL MEDICINE

## 2022-10-19 PROCEDURE — 99284 EMERGENCY DEPT VISIT MOD MDM: CPT | Mod: 25

## 2022-10-19 PROCEDURE — 25000003 PHARM REV CODE 250: Performed by: INTERNAL MEDICINE

## 2022-10-19 PROCEDURE — 36415 COLL VENOUS BLD VENIPUNCTURE: CPT | Performed by: INTERNAL MEDICINE

## 2022-10-19 PROCEDURE — 63600175 PHARM REV CODE 636 W HCPCS: Performed by: INTERNAL MEDICINE

## 2022-10-19 PROCEDURE — 85610 PROTHROMBIN TIME: CPT | Performed by: INTERNAL MEDICINE

## 2022-10-19 PROCEDURE — 83690 ASSAY OF LIPASE: CPT | Performed by: INTERNAL MEDICINE

## 2022-10-19 PROCEDURE — 81001 URINALYSIS AUTO W/SCOPE: CPT | Performed by: INTERNAL MEDICINE

## 2022-10-19 PROCEDURE — 96375 TX/PRO/DX INJ NEW DRUG ADDON: CPT

## 2022-10-19 PROCEDURE — 96361 HYDRATE IV INFUSION ADD-ON: CPT

## 2022-10-19 PROCEDURE — 96374 THER/PROPH/DIAG INJ IV PUSH: CPT

## 2022-10-19 RX ORDER — PROMETHAZINE HYDROCHLORIDE 25 MG/1
25 TABLET ORAL EVERY 6 HOURS PRN
Qty: 15 TABLET | Refills: 0 | Status: SHIPPED | OUTPATIENT
Start: 2022-10-19 | End: 2023-03-24

## 2022-10-19 RX ORDER — PROCHLORPERAZINE EDISYLATE 5 MG/ML
5 INJECTION INTRAMUSCULAR; INTRAVENOUS ONCE
Status: COMPLETED | OUTPATIENT
Start: 2022-10-19 | End: 2022-10-19

## 2022-10-19 RX ORDER — ONDANSETRON 2 MG/ML
4 INJECTION INTRAMUSCULAR; INTRAVENOUS ONCE
Status: COMPLETED | OUTPATIENT
Start: 2022-10-19 | End: 2022-10-19

## 2022-10-19 RX ORDER — ONDANSETRON 4 MG/1
4 TABLET, ORALLY DISINTEGRATING ORAL EVERY 6 HOURS PRN
Qty: 15 TABLET | Refills: 0 | Status: SHIPPED | OUTPATIENT
Start: 2022-10-19 | End: 2023-03-24

## 2022-10-19 RX ADMIN — SODIUM CHLORIDE 1000 ML: 9 INJECTION, SOLUTION INTRAVENOUS at 08:10

## 2022-10-19 RX ADMIN — ONDANSETRON HYDROCHLORIDE 4 MG: 2 SOLUTION INTRAMUSCULAR; INTRAVENOUS at 08:10

## 2022-10-19 RX ADMIN — PROCHLORPERAZINE EDISYLATE 5 MG: 5 INJECTION INTRAMUSCULAR; INTRAVENOUS at 08:10

## 2022-10-19 NOTE — Clinical Note
"Figueroa Downeylucas Murcia was seen and treated in our emergency department on 10/19/2022.  He may return to work on 10/21/2022.       If you have any questions or concerns, please don't hesitate to call.      Neftali Sánchez, DO"

## 2022-10-20 NOTE — ED PROVIDER NOTES
Source of History:  Patient, no limitations    Chief complaint:  Abdominal Cramping (Left abd pain and nausea that began today at 7pm; )      HPI:  Figueroa Murcia is a 32 y.o. male presenting with Abdominal Cramping (Left abd pain and nausea that began today at 7pm; )         Presents with nausea and vomiting of dry heaves.  Onset of symptoms was abrupt starting a few hours ago with unchanged course since that time. Symptoms have been occuring  all day.  Outpatient therapy with none has been attempted and symptoms have failed to improve. Symptoms are currently rated moderate. Associated signs & symptoms include abdominal pain.        Review of Systems   Constitutional symptoms:  Negative except as documented in HPI.   Skin symptoms:  Negative except as documented in HPI.   HEENT symptoms:  Negative except as documented in HPI.   Respiratory symptoms:  Negative except as documented in HPI.   Cardiovascular symptoms:  Negative except as documented in HPI.   Gastrointestinal symptoms:  Negative except as documented in HPI.    Genitourinary symptoms:  Negative except as documented in HPI.   Musculoskeletal symptoms:  Negative except as documented in HPI.   Neurologic symptoms:  Negative except as documented in HPI.   Psychiatric symptoms:  Negative except as documented in HPI.   Allergy/immunologic symptoms:  Negative except as documented in HPI.             Additional review of systems information: All other systems reviewed and otherwise negative.      Review of patient's allergies indicates:  No Known Allergies    PMH:  As per HPI and below:    Past Medical History:   Diagnosis Date    At risk for alteration in comfort     Obesity, unspecified     Tobacco abuse         Family History   Problem Relation Age of Onset    Hypertension Mother     No Known Problems Father        Past Surgical History:   Procedure Laterality Date    arm repair Right        Social History     Tobacco Use    Smoking status: Every Day      "Types: Cigarettes    Smokeless tobacco: Never   Substance Use Topics    Alcohol use: Yes    Drug use: Yes     Types: Marijuana       Patient Active Problem List   Diagnosis    Acute stress reaction    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    General:  Alert, no acute distress.   Skin: Normal for Ethnic Origin, No cyanosis  HEENT: Normocephalic and atraumatic, Vision unchanged, Pupils symmetric, No icterus , Nasal mucosa is pink and moist  Cardiovascular:  Regular rate and rhythm, No edema  Chest Wall: No deformity, equal chest rise  Respiratory:  Lungs are clear to auscultation, respirations are non-labored.    Musculoskeletal:  No deformity, Normal perfusion to all extremities  Back: No bony tenderness  : No suprapubic pain, No CVA tenderness  Gastrointestinal:  Soft, Nontender, Non distended, Normal bowel sounds.    Neurological:  Alert and oriented to person, place, time, and situation, normal motor observed, normal speech observed.    Psychiatric:  Cooperative, appropriate mood & affect.        Labs that have been ordered have been independently reviewed and interpreted by myself.     Old Chart Reviewed.      Initial Impression/ Differential Dx:  Gastritis, viral gastroenteritis, pancreatitis, cholecystitis, ileus, small bowel obstruction, appendicitis.      MDM:      Reviewed Nurses Note.    Reviewed Pertinent old records.    Orders Placed This Encounter    Urinalysis, Reflex to Urine Culture Urine, Clean Catch    Drug Screen, Urine    CBC Auto Differential    Comprehensive Metabolic Panel    Protime-INR    Lipase    Magnesium    CK    CBC with Differential    Insert " peripheral IV    sodium chloride 0.9% bolus 1,000 mL    prochlorperazine injection Soln 5 mg    ondansetron injection 4 mg    ondansetron (ZOFRAN-ODT) 4 MG TbDL    promethazine (PHENERGAN) 25 MG tablet                    Labs Reviewed   DRUG SCREEN, URINE (BEAKER) - Abnormal; Notable for the following components:       Result Value    Amphetamines, Urine Positive (*)     Cannabinoids, Urine Positive (*)     All other components within normal limits    Narrative:     Cut off concentrations:    Amphetamines - 1000 ng/ml  Barbiturates - 200 ng/ml  Benzodiazepine - 200 ng/ml  Cannabinoids (THC) - 50 ng/ml  Cocaine - 300 ng/ml  Fentanyl - 1.0 ng/ml  MDMA - 500 ng/ml  Opiates - 300 ng/ml   Phencyclidine (PCP) - 25 ng/ml    Specimen submitted for drug analysis and tested for pH and specific gravity in order to evaluate sample integrity. Suspect tampering if specific gravity is <1.003 and/or pH is not within the range of 4.5 - 8.0  False negatives may result form substances such as bleach added to urine.  False positives may result for the presence of a substance with similar chemical structure to the drug or its metabolite.    This test provides only a PRELIMINARY analytical test result. A more specific alternate chemical method must be used in order to obtain a confirmed analytical result. Gas chromatography/mass spectrometry (GC/MS) is the preferred confirmatory method. Other chemical confirmation methods are available. Clinical consideration and professional judgement should be applied to any drug of abuse test result, particularly when preliminary positive results are used.    Positive results will be confirmed only at the physicians request. Unconfirmed screening results are to be used only for medical purposes (treatment).        COMPREHENSIVE METABOLIC PANEL - Abnormal; Notable for the following components:    Carbon Dioxide 31 (*)     All other components within normal limits   PROTIME-INR - Abnormal; Notable for  the following components:    INR 0.92 (*)     All other components within normal limits   CK - Abnormal; Notable for the following components:    Creatine Kinase 533 (*)     All other components within normal limits   CBC WITH DIFFERENTIAL - Abnormal; Notable for the following components:    WBC 13.6 (*)     Neut # 9.4 (*)     IG# 0.05 (*)     All other components within normal limits   LIPASE - Normal   MAGNESIUM - Normal   URINALYSIS, REFLEX TO URINE CULTURE   CBC W/ AUTO DIFFERENTIAL    Narrative:     The following orders were created for panel order CBC Auto Differential.  Procedure                               Abnormality         Status                     ---------                               -----------         ------                     CBC with Differential[[id number]]        Abnormal            Final result                 Please view results for these tests on the individual orders.          No orders to display        Admission on 10/19/2022   Component Date Value Ref Range Status    Color, UA 10/19/2022 Yellow  Yellow, Light-Yellow, Dark Yellow, Nichol, Straw Final    Appearance, UA 10/19/2022 Clear  Clear Final    Specific Gravity, UA 10/19/2022 >=1.030   Final    pH, UA 10/19/2022 6.5  5.0, 5.5, 6.0, 6.5, 7.0, 7.5, 8.0, 8.5 Final    Protein, UA 10/19/2022 Negative  Negative mg/dL Final    Glucose, UA 10/19/2022 Negative  Negative, Normal mg/dL Final    Ketones, UA 10/19/2022 Negative  Negative mg/dL Final    Blood, UA 10/19/2022 Negative  Negative unit/L Final    Bilirubin, UA 10/19/2022 Negative  Negative mg/dL Final    Urobilinogen, UA 10/19/2022 0.2  0.2, 1.0, Normal mg/dL Final    Nitrites, UA 10/19/2022 Negative  Negative Final    Leukocyte Esterase, UA 10/19/2022 Negative  Negative unit/L Final    Amphetamines, Urine 10/19/2022 Positive (A)  Negative Final    Barbituates, Urine 10/19/2022 Negative  Negative Final    Benzodiazepine, Urine 10/19/2022 Negative  Negative Final    Cannabinoids,  Urine 10/19/2022 Positive (A)  Negative Final    Cocaine, Urine 10/19/2022 Negative  Negative Final    MDMA, Urine 10/19/2022 Negative  Negative Final    Opiates, Urine 10/19/2022 Negative  Negative Final    Phencyclidine, Urine 10/19/2022 Negative  Negative Final    pH, Urine 10/19/2022 6.5  3.0 - 11.0 Final    Specific Gravity, Urine Auto 10/19/2022 <1.030  1.001 - 1.035 Final    Sodium Level 10/19/2022 143  136 - 145 mmol/L Final    Potassium Level 10/19/2022 3.9  3.5 - 5.1 mmol/L Final    Chloride 10/19/2022 104  98 - 107 mmol/L Final    Carbon Dioxide 10/19/2022 31 (H)  22 - 29 mmol/L Final    Glucose Level 10/19/2022 89  74 - 100 mg/dL Final    Blood Urea Nitrogen 10/19/2022 10.1  8.9 - 20.6 mg/dL Final    Creatinine 10/19/2022 0.95  0.73 - 1.18 mg/dL Final    Calcium Level Total 10/19/2022 9.8  8.4 - 10.2 mg/dL Final    Protein Total 10/19/2022 7.4  6.4 - 8.3 gm/dL Final    Albumin Level 10/19/2022 4.0  3.5 - 5.0 gm/dL Final    Globulin 10/19/2022 3.4  2.4 - 3.5 gm/dL Final    Albumin/Globulin Ratio 10/19/2022 1.2  1.1 - 2.0 ratio Final    Bilirubin Total 10/19/2022 0.5  <=1.5 mg/dL Final    Alkaline Phosphatase 10/19/2022 61  40 - 150 unit/L Final    Alanine Aminotransferase 10/19/2022 24  0 - 55 unit/L Final    Aspartate Aminotransferase 10/19/2022 24  5 - 34 unit/L Final    eGFR 10/19/2022 >60  mls/min/1.73/m2 Final    PT 10/19/2022 12.2  11.7 - 14.5 seconds Final    INR 10/19/2022 0.92 (L)  2.00 - 3.00 Final    Lipase Level 10/19/2022 33  <=60 U/L Final    Magnesium Level 10/19/2022 1.80  1.60 - 2.60 mg/dL Final    Creatine Kinase 10/19/2022 533 (H)  30 - 200 U/L Final    WBC 10/19/2022 13.6 (H)  4.5 - 11.5 x10(3)/mcL Final    RBC 10/19/2022 5.34  4.70 - 6.10 x10(6)/mcL Final    Hgb 10/19/2022 15.5  14.0 - 18.0 gm/dL Final    Hct 10/19/2022 45.7  42.0 - 52.0 % Final    MCV 10/19/2022 85.6  80.0 - 94.0 fL Final    MCH 10/19/2022 29.0  27.0 - 31.0 pg Final    MCHC 10/19/2022 33.9  33.0 - 36.0 mg/dL Final     RDW 10/19/2022 12.1  11.5 - 17.0 % Final    Platelet 10/19/2022 201  130 - 400 x10(3)/mcL Final    MPV 10/19/2022 9.7  7.4 - 10.4 fL Final    Neut % 10/19/2022 68.8  % Final    Lymph % 10/19/2022 21.2  % Final    Mono % 10/19/2022 7.2  % Final    Eos % 10/19/2022 2.0  % Final    Basophil % 10/19/2022 0.4  % Final    Lymph # 10/19/2022 2.89  0.6 - 4.6 x10(3)/mcL Final    Neut # 10/19/2022 9.4 (H)  2.1 - 9.2 x10(3)/mcL Final    Mono # 10/19/2022 0.98  0.1 - 1.3 x10(3)/mcL Final    Eos # 10/19/2022 0.27  0 - 0.9 x10(3)/mcL Final    Baso # 10/19/2022 0.05  0 - 0.2 x10(3)/mcL Final    IG# 10/19/2022 0.05 (H)  0 - 0.04 x10(3)/mcL Final    IG% 10/19/2022 0.4  % Final    NRBC% 10/19/2022 0.0  % Final       Imaging Results    None                        ED Course as of 10/19/22 2146   Wed Oct 19, 2022   2112 Urinalysis, Reflex to Urine Culture Urine, Clean Catch  No blood, stone less likely [MP]   2112 Cannabinoids, Urine(!): Positive  Consider THC induced vomiting  [MP]      ED Course User Index  [MP] Neftali Sánchez DO                        Diagnostic Impression:    1. Abdominal cramping    2. Bilious vomiting with nausea         ED Disposition Condition    Discharge Stable             Follow-up Information       [facility] - Emergency Dept.    Specialty: Emergency Medicine  Why: If symptoms worsen  Contact information:  [address]  [facility] [address] [phone]                            ED Prescriptions       Medication Sig Dispense Start Date End Date Auth. Provider    ondansetron (ZOFRAN-ODT) 4 MG TbDL Take 1 tablet (4 mg total) by mouth every 6 (six) hours as needed (nausea). 15 tablet 10/19/2022 -- Neftali Sánchez, DO    promethazine (PHENERGAN) 25 MG tablet Take 1 tablet (25 mg total) by mouth every 6 (six) hours as needed for Nausea. 15 tablet 10/19/2022 -- Neftali Sánchez DO          Follow-up Information       Follow up With Specialties Details  Why Contact Info    [facility] - Emergency Dept Emergency Medicine  If symptoms worsen [address]  [facility] [id number] [phone]             Neftali Sánchez DO  10/19/22 2141

## 2022-10-21 ENCOUNTER — HOSPITAL ENCOUNTER (EMERGENCY)
Facility: HOSPITAL | Age: 32
Discharge: HOME | End: 2022-10-21
Attending: EMERGENCY MEDICINE

## 2022-10-21 ENCOUNTER — HOSPITAL ENCOUNTER (INPATIENT)
Facility: HOSPITAL | Age: 32
LOS: 2 days | Discharge: HOME | DRG: 343 | End: 2022-10-23
Attending: INTERNAL MEDICINE | Admitting: SURGERY

## 2022-10-21 VITALS
WEIGHT: 220 LBS | HEIGHT: 71 IN | TEMPERATURE: 99 F | BODY MASS INDEX: 30.8 KG/M2 | SYSTOLIC BLOOD PRESSURE: 134 MMHG | HEART RATE: 90 BPM | RESPIRATION RATE: 18 BRPM | OXYGEN SATURATION: 97 % | DIASTOLIC BLOOD PRESSURE: 86 MMHG

## 2022-10-21 DIAGNOSIS — R10.9 RIGHT SIDED ABDOMINAL PAIN: ICD-10-CM

## 2022-10-21 DIAGNOSIS — R11.2 NAUSEA AND VOMITING, UNSPECIFIED VOMITING TYPE: ICD-10-CM

## 2022-10-21 DIAGNOSIS — K35.80 ACUTE APPENDICITIS, UNSPECIFIED ACUTE APPENDICITIS TYPE: Primary | ICD-10-CM

## 2022-10-21 DIAGNOSIS — Z90.49 S/P LAPAROSCOPIC APPENDECTOMY: ICD-10-CM

## 2022-10-21 DIAGNOSIS — R10.84 GENERALIZED ABDOMINAL PAIN: Primary | ICD-10-CM

## 2022-10-21 LAB
ALBUMIN SERPL-MCNC: 3.8 GM/DL (ref 3.5–5)
ALBUMIN/GLOB SERPL: 1.1 RATIO (ref 1.1–2)
ALP SERPL-CCNC: 57 UNIT/L (ref 40–150)
ALT SERPL-CCNC: 32 UNIT/L (ref 0–55)
APPEARANCE UR: CLEAR
AST SERPL-CCNC: 31 UNIT/L (ref 5–34)
BACTERIA #/AREA URNS AUTO: ABNORMAL /HPF
BASOPHILS # BLD AUTO: 0.03 X10(3)/MCL (ref 0–0.2)
BASOPHILS NFR BLD AUTO: 0.2 %
BILIRUB UR QL STRIP.AUTO: NEGATIVE MG/DL
BILIRUBIN DIRECT+TOT PNL SERPL-MCNC: 0.8 MG/DL
BUN SERPL-MCNC: 10.3 MG/DL (ref 8.9–20.6)
CALCIUM SERPL-MCNC: 9.9 MG/DL (ref 8.4–10.2)
CHLORIDE SERPL-SCNC: 98 MMOL/L (ref 98–107)
CO2 SERPL-SCNC: 30 MMOL/L (ref 22–29)
COLOR UR AUTO: YELLOW
CREAT SERPL-MCNC: 0.93 MG/DL (ref 0.73–1.18)
EOSINOPHIL # BLD AUTO: 0.02 X10(3)/MCL (ref 0–0.9)
EOSINOPHIL NFR BLD AUTO: 0.2 %
ERYTHROCYTE [DISTWIDTH] IN BLOOD BY AUTOMATED COUNT: 12.2 % (ref 11.5–17)
GFR SERPLBLD CREATININE-BSD FMLA CKD-EPI: >60 MLS/MIN/1.73/M2
GLOBULIN SER-MCNC: 3.6 GM/DL (ref 2.4–3.5)
GLUCOSE SERPL-MCNC: 100 MG/DL (ref 74–100)
GLUCOSE UR QL STRIP.AUTO: NORMAL MG/DL
HCT VFR BLD AUTO: 47.1 % (ref 42–52)
HGB BLD-MCNC: 15.9 GM/DL (ref 14–18)
HYALINE CASTS #/AREA URNS LPF: ABNORMAL /LPF
IMM GRANULOCYTES # BLD AUTO: 0.08 X10(3)/MCL (ref 0–0.04)
IMM GRANULOCYTES NFR BLD AUTO: 0.7 %
KETONES UR QL STRIP.AUTO: ABNORMAL MG/DL
LACTATE SERPL-SCNC: 1.8 MMOL/L (ref 0.5–2.2)
LEUKOCYTE ESTERASE UR QL STRIP.AUTO: NEGATIVE UNIT/L
LIPASE SERPL-CCNC: 26 U/L
LYMPHOCYTES # BLD AUTO: 1.59 X10(3)/MCL (ref 0.6–4.6)
LYMPHOCYTES NFR BLD AUTO: 12.9 %
MCH RBC QN AUTO: 29.2 PG (ref 27–31)
MCHC RBC AUTO-ENTMCNC: 33.8 MG/DL (ref 33–36)
MCV RBC AUTO: 86.6 FL (ref 80–94)
MONOCYTES # BLD AUTO: 0.7 X10(3)/MCL (ref 0.1–1.3)
MONOCYTES NFR BLD AUTO: 5.7 %
MUCOUS THREADS URNS QL MICRO: ABNORMAL /LPF
NEUTROPHILS # BLD AUTO: 9.9 X10(3)/MCL (ref 2.1–9.2)
NEUTROPHILS NFR BLD AUTO: 80.3 %
NITRITE UR QL STRIP.AUTO: NEGATIVE
NRBC BLD AUTO-RTO: 0 %
PH UR STRIP.AUTO: 6.5 [PH]
PLATELET # BLD AUTO: 223 X10(3)/MCL (ref 130–400)
PMV BLD AUTO: 10.2 FL (ref 7.4–10.4)
POTASSIUM SERPL-SCNC: 3.6 MMOL/L (ref 3.5–5.1)
PROT SERPL-MCNC: 7.4 GM/DL (ref 6.4–8.3)
PROT UR QL STRIP.AUTO: ABNORMAL MG/DL
RBC # BLD AUTO: 5.44 X10(6)/MCL (ref 4.7–6.1)
RBC #/AREA URNS AUTO: ABNORMAL /HPF
RBC UR QL AUTO: NEGATIVE UNIT/L
SARS-COV-2 RDRP RESP QL NAA+PROBE: NEGATIVE
SODIUM SERPL-SCNC: 141 MMOL/L (ref 136–145)
SP GR UR STRIP.AUTO: 1.04
SQUAMOUS #/AREA URNS LPF: ABNORMAL /HPF
UROBILINOGEN UR STRIP-ACNC: ABNORMAL MG/DL
WBC # SPEC AUTO: 12.3 X10(3)/MCL (ref 4.5–11.5)
WBC #/AREA URNS AUTO: ABNORMAL /HPF

## 2022-10-21 PROCEDURE — 99283 EMERGENCY DEPT VISIT LOW MDM: CPT | Mod: 25,27

## 2022-10-21 PROCEDURE — 25000003 PHARM REV CODE 250: Performed by: PHYSICIAN ASSISTANT

## 2022-10-21 PROCEDURE — 96374 THER/PROPH/DIAG INJ IV PUSH: CPT

## 2022-10-21 PROCEDURE — 96361 HYDRATE IV INFUSION ADD-ON: CPT

## 2022-10-21 PROCEDURE — 80053 COMPREHEN METABOLIC PANEL: CPT | Performed by: PHYSICIAN ASSISTANT

## 2022-10-21 PROCEDURE — 25000003 PHARM REV CODE 250: Performed by: STUDENT IN AN ORGANIZED HEALTH CARE EDUCATION/TRAINING PROGRAM

## 2022-10-21 PROCEDURE — 63600175 PHARM REV CODE 636 W HCPCS: Performed by: STUDENT IN AN ORGANIZED HEALTH CARE EDUCATION/TRAINING PROGRAM

## 2022-10-21 PROCEDURE — 81003 URINALYSIS AUTO W/O SCOPE: CPT | Performed by: PHYSICIAN ASSISTANT

## 2022-10-21 PROCEDURE — 83690 ASSAY OF LIPASE: CPT | Performed by: PHYSICIAN ASSISTANT

## 2022-10-21 PROCEDURE — 96372 THER/PROPH/DIAG INJ SC/IM: CPT | Performed by: PHYSICIAN ASSISTANT

## 2022-10-21 PROCEDURE — 83605 ASSAY OF LACTIC ACID: CPT | Performed by: STUDENT IN AN ORGANIZED HEALTH CARE EDUCATION/TRAINING PROGRAM

## 2022-10-21 PROCEDURE — 87635 SARS-COV-2 COVID-19 AMP PRB: CPT | Performed by: SPECIALIST

## 2022-10-21 PROCEDURE — 85025 COMPLETE CBC W/AUTO DIFF WBC: CPT | Performed by: PHYSICIAN ASSISTANT

## 2022-10-21 PROCEDURE — 11000001 HC ACUTE MED/SURG PRIVATE ROOM

## 2022-10-21 PROCEDURE — 63600175 PHARM REV CODE 636 W HCPCS: Performed by: PHYSICIAN ASSISTANT

## 2022-10-21 PROCEDURE — 99285 EMERGENCY DEPT VISIT HI MDM: CPT | Mod: 25

## 2022-10-21 PROCEDURE — 36415 COLL VENOUS BLD VENIPUNCTURE: CPT | Performed by: PHYSICIAN ASSISTANT

## 2022-10-21 RX ORDER — ONDANSETRON 2 MG/ML
4 INJECTION INTRAMUSCULAR; INTRAVENOUS ONCE
Status: COMPLETED | OUTPATIENT
Start: 2022-10-21 | End: 2022-10-21

## 2022-10-21 RX ORDER — DICYCLOMINE HYDROCHLORIDE 10 MG/ML
20 INJECTION INTRAMUSCULAR
Status: COMPLETED | OUTPATIENT
Start: 2022-10-21 | End: 2022-10-21

## 2022-10-21 RX ORDER — MORPHINE SULFATE 2 MG/ML
2 INJECTION, SOLUTION INTRAMUSCULAR; INTRAVENOUS EVERY 4 HOURS PRN
Status: DISCONTINUED | OUTPATIENT
Start: 2022-10-21 | End: 2022-10-23 | Stop reason: HOSPADM

## 2022-10-21 RX ORDER — SODIUM CHLORIDE 9 MG/ML
1000 INJECTION, SOLUTION INTRAVENOUS
Status: COMPLETED | OUTPATIENT
Start: 2022-10-21 | End: 2022-10-21

## 2022-10-21 RX ORDER — ACETAMINOPHEN 325 MG/1
650 TABLET ORAL EVERY 8 HOURS PRN
Status: DISCONTINUED | OUTPATIENT
Start: 2022-10-21 | End: 2022-10-23 | Stop reason: HOSPADM

## 2022-10-21 RX ORDER — ENOXAPARIN SODIUM 100 MG/ML
40 INJECTION SUBCUTANEOUS
Status: DISCONTINUED | OUTPATIENT
Start: 2022-10-22 | End: 2022-10-23 | Stop reason: HOSPADM

## 2022-10-21 RX ORDER — OXYCODONE HYDROCHLORIDE 5 MG/1
5 TABLET ORAL EVERY 4 HOURS PRN
Status: DISCONTINUED | OUTPATIENT
Start: 2022-10-21 | End: 2022-10-23 | Stop reason: HOSPADM

## 2022-10-21 RX ORDER — KETOROLAC TROMETHAMINE 30 MG/ML
30 INJECTION, SOLUTION INTRAMUSCULAR; INTRAVENOUS
Status: COMPLETED | OUTPATIENT
Start: 2022-10-21 | End: 2022-10-21

## 2022-10-21 RX ORDER — MORPHINE SULFATE 2 MG/ML
4 INJECTION, SOLUTION INTRAMUSCULAR; INTRAVENOUS
Status: DISCONTINUED | OUTPATIENT
Start: 2022-10-21 | End: 2022-10-21

## 2022-10-21 RX ORDER — ONDANSETRON 2 MG/ML
4 INJECTION INTRAMUSCULAR; INTRAVENOUS EVERY 6 HOURS PRN
Status: DISCONTINUED | OUTPATIENT
Start: 2022-10-21 | End: 2022-10-23 | Stop reason: HOSPADM

## 2022-10-21 RX ORDER — MORPHINE SULFATE 2 MG/ML
4 INJECTION, SOLUTION INTRAMUSCULAR; INTRAVENOUS
Status: COMPLETED | OUTPATIENT
Start: 2022-10-21 | End: 2022-10-21

## 2022-10-21 RX ORDER — SODIUM CHLORIDE, SODIUM LACTATE, POTASSIUM CHLORIDE, CALCIUM CHLORIDE 600; 310; 30; 20 MG/100ML; MG/100ML; MG/100ML; MG/100ML
INJECTION, SOLUTION INTRAVENOUS CONTINUOUS
Status: DISCONTINUED | OUTPATIENT
Start: 2022-10-21 | End: 2022-10-23 | Stop reason: HOSPADM

## 2022-10-21 RX ADMIN — KETOROLAC TROMETHAMINE 30 MG: 30 INJECTION, SOLUTION INTRAMUSCULAR at 05:10

## 2022-10-21 RX ADMIN — MORPHINE SULFATE 4 MG: 2 INJECTION, SOLUTION INTRAMUSCULAR; INTRAVENOUS at 07:10

## 2022-10-21 RX ADMIN — SODIUM CHLORIDE, POTASSIUM CHLORIDE, SODIUM LACTATE AND CALCIUM CHLORIDE: 600; 310; 30; 20 INJECTION, SOLUTION INTRAVENOUS at 07:10

## 2022-10-21 RX ADMIN — PIPERACILLIN AND TAZOBACTAM 4.5 G: 4; .5 INJECTION, POWDER, LYOPHILIZED, FOR SOLUTION INTRAVENOUS; PARENTERAL at 08:10

## 2022-10-21 RX ADMIN — ONDANSETRON 4 MG: 2 INJECTION INTRAMUSCULAR; INTRAVENOUS at 06:10

## 2022-10-21 RX ADMIN — SODIUM CHLORIDE 1000 ML: 9 INJECTION, SOLUTION INTRAVENOUS at 06:10

## 2022-10-21 RX ADMIN — DICYCLOMINE HYDROCHLORIDE 20 MG: 20 INJECTION INTRAMUSCULAR at 05:10

## 2022-10-21 NOTE — ED TRIAGE NOTES
"C/o abd pain and vomiting X 3 days. Seen here for same 2 days ago, but states zofran he was prescribed is "not helping"  "

## 2022-10-21 NOTE — ED PROVIDER NOTES
"Encounter Date: 10/21/2022       History     Chief Complaint   Patient presents with    Vomiting     C/o abd pain and vomiting X 3 days    Abdominal Pain     C/o abd pain and vomiting X 3 days. Seen here for same 2 days ago, but states zofran he was prescribed is "not helping"     32-year-old gentleman presents to the emergency room complaining of recurrent abdominal pain, nausea, vomiting.  He has been waiting 26 minutes before went in the room and he is cursing, angry that he has been waiting so long.  He states that if he was white, we would have been tending to him more quickly, but at this time there is 8 people in the emergency room with 1 physician and I have seen him in a reasonable amount of time considering the number of people who are also waiting to be seen.      Review of patient's allergies indicates:  No Known Allergies  Past Medical History:   Diagnosis Date    At risk for alteration in comfort     Obesity, unspecified     Tobacco abuse      Past Surgical History:   Procedure Laterality Date    arm repair Right      Family History   Problem Relation Age of Onset    Hypertension Mother     No Known Problems Father      Social History     Tobacco Use    Smoking status: Every Day     Types: Cigarettes    Smokeless tobacco: Never   Substance Use Topics    Alcohol use: Yes    Drug use: Yes     Types: Marijuana     Review of Systems   Gastrointestinal:  Positive for abdominal pain, nausea and vomiting.   All other systems reviewed and are negative.    Physical Exam     Initial Vitals [10/21/22 1100]   BP Pulse Resp Temp SpO2   134/86 90 18 98.6 °F (37 °C) 97 %      MAP       --         Physical Exam    Pulmonary/Chest: No respiratory distress.   Musculoskeletal:      Comments: Ambulatory without assistance     Psychiatric:   Angry, cursing       ED Course   Procedures  Labs Reviewed - No data to display       Imaging Results    None          Medications - No data to display  Medical Decision Making:   ED " Management:  Patient waited 26 minutes before I saw him and when I went into the room, he is angry, conversing cursing, upset that he is not being seen quicker.  However, there is 1 ER physician and 8 patient and I saw him as quickly as I could.  I explained to him that we would do blood work, give him IV fluids, IV pain medication, and IV nausea medication.  He states that all that was done previously and his test results were all normal and he does not want me to repeat the same results.  He has a follow-up appointment with his doctor on 10/26/22 with his primary care provider but came in today saying that he could not wait on his appointment.  He very quickly started cursing and saying that if he had been a white man, we would have been tending to him, despite the fact that I am telling him I am going to give him medication and fluids.  He left against medical advice.  He removed his own IV.                        Clinical Impression:   Final diagnoses:  [R10.84] Generalized abdominal pain (Primary)      ED Disposition Condition    AMA Stable                Kyara Andrews MD  10/21/22 3058

## 2022-10-21 NOTE — H&P
"General Surgery  History and Physical    SUBJECTIVE:     Chief Complaint:   Per triage note--Flank Pain (Right flank pain radiating to the RUQ x 2 days. Reports increased pain after eating "something greasy". Seen at other local ER today and 2 days prior; filled Zofran today. Family reports no imaging was performed and they are concerned for gallbladder.)      History of Present Illness:  Mr. Murcia is a 32 y.o. male w/ no known medical hx who presented with a 2 day duration on N/V, anorexia, and severe RLQ pain radiating to lower abdomen. Last report BM was on Wednesday. Denies chest pain, diarrhea, and constipation. Positive for tobacco use (0.5 per day) and marijuana. Prior surgical hx of R hand surgery as a child. Recent CT abd/pelvis revealed acute appendicitis without perforation of periappendiceal fluid collection. No previous colonoscopy.    Past Medical History:  Past Medical History:   Diagnosis Date    At risk for alteration in comfort     Obesity, unspecified     Tobacco abuse        Home Medications:  No current facility-administered medications on file prior to encounter.     Current Outpatient Medications on File Prior to Encounter   Medication Sig    amoxicillin-clavulanate 875-125mg (AUGMENTIN) 875-125 mg per tablet Take 1 tablet by mouth 2 (two) times daily.    ergocalciferol (ERGOCALCIFEROL) 50,000 unit Cap Take 1 capsule (50,000 Units total) by mouth every 7 days. For Vitamin D    ibuprofen (ADVIL,MOTRIN) 800 MG tablet Take 1 tablet (800 mg total) by mouth every 8 (eight) hours as needed for Pain.    ondansetron (ZOFRAN-ODT) 4 MG TbDL Take 1 tablet (4 mg total) by mouth every 6 (six) hours as needed (nausea).    promethazine (PHENERGAN) 25 MG tablet Take 1 tablet (25 mg total) by mouth every 6 (six) hours as needed for Nausea.       Allergies:  Review of patient's allergies indicates:  No Known Allergies    Surgical History:  Past Surgical History:   Procedure Laterality Date    arm repair Right  "       Family History:  Family History   Problem Relation Age of Onset    Hypertension Mother     No Known Problems Father        Social History:  Social History     Tobacco Use    Smoking status: Every Day     Packs/day: 0.25     Types: Cigarettes    Smokeless tobacco: Never   Substance Use Topics    Alcohol use: Yes    Drug use: Yes     Frequency: 7.0 times per week     Types: Marijuana        Review of Systems:  Skin: No rashes or itching.  Head: Denies headache or recent trauma.  Eyes: Denies eye pain or double vision.  Neck: Denies swelling or hoarseness of voice.  Respiratory: Denies shortness of breath or chest pain  Cardiac: Denies palpitations or swelling in hands/feet.  Gastrointestinal: Positive for N/V and RLQ pain radiating to back.   Urinary: Denies dysuria or hematuria.  Vascular: Denies claudication or leg swelling.  Neuro: Denies motor deficits. Denies weakness.  Endocrine: Denies excessive sweating or cold intolerance.  Psych: Denies memory problems. Denies anxiety.      OBJECTIVE:     Vital Signs:  Temp: 98.2 °F (36.8 °C) (10/21/22 1641)  Pulse: (!) 59 (10/21/22 1641)  Resp: 16 (10/21/22 1641)  BP: (!) 150/70 (10/21/22 1641)  SpO2: 99 % (10/21/22 1641)    Physical Exam:  General: well developed, well nourished, no distress  HEENT: NCAT, EOMI  Neck: supple, symmetrical  Lungs: Normal WOB, No SOB  Cardiovascular: regular rate  Abdomen: non-distended, severe tenderness to RLQ radiating to the back; negative for Rosving sign. No rebound tenderness.  Skin: Skin color, texture, turgor normal. No rashes or lesions  Musculoskeletal:no clubbing, cyanosis, no deformities  Neurologic: No focal numbness or weakness  Psych/Behavioral:  Alert and oriented, appropriate affect.    Laboratory:  Labs Reviewed   COMPREHENSIVE METABOLIC PANEL - Abnormal; Notable for the following components:       Result Value    Carbon Dioxide 30 (*)     Globulin 3.6 (*)     All other components within normal limits   URINALYSIS,  REFLEX TO URINE CULTURE - Abnormal; Notable for the following components:    Protein, UA 1+ (*)     Ketones, UA Trace (*)     Urobilinogen, UA 2+ (*)     Squamous Epithelial Cells, UA Trace (*)     Mucous, UA Moderate (*)     All other components within normal limits   CBC WITH DIFFERENTIAL - Abnormal; Notable for the following components:    WBC 12.3 (*)     Neut # 9.9 (*)     IG# 0.08 (*)     All other components within normal limits   LIPASE - Normal   BLOOD CULTURE OLG   BLOOD CULTURE OLG   CBC W/ AUTO DIFFERENTIAL    Narrative:     The following orders were created for panel order CBC Auto Differential.                  Procedure                               Abnormality         Status                                     ---------                               -----------         ------                                     CBC with Differential[[id number]]        Abnormal            Final result                                                 Please view results for these tests on the individual orders.   EXTRA TUBES    Narrative:     The following orders were created for panel order EXTRA TUBES.                  Procedure                               Abnormality         Status                                     ---------                               -----------         ------                                     Red Top Hold[[id number]]                                     In process                                                   Please view results for these tests on the individual orders.   RED TOP HOLD   LACTIC ACID, PLASMA         Diagnostic Results:  Imaging Results               CT Abdomen Pelvis  Without Contrast (Final result)  Result time 10/21/22 17:41:27      Final result by Levi Jones MD (10/21/22 17:41:27)                   Narrative:    EXAMINATION  CT ABDOMEN PELVIS WITHOUT CONTRAST    CLINICAL HISTORY  Flank pain, kidney stone suspected;  right flank pain    TECHNIQUE  Non-contrast  helical-acquisition CT images were obtained and multiplanar reformats accomplished by a CT technologist at a separate workstation, pushed to PACS for physician review.    Enteric contrast: none utilized    COMPARISON  18 September 2021    FINDINGS  Images were reviewed in soft tissue, lung, and bone windows.    Exam quality: Inherently limited evaluation of the abdominopelvic organs and vasculature secondary to non-contrast protocol.  Limited secondary to patient movement throughout image acquisition, with resulting artifact.    Lines/tubes: none visualized    No acute or new focal abnormality is visualized at the included lower thoracic cavity.  No significant pleural or pericardial fluid is identified.  Regional vascular structures are unremarkable.    The gallbladder and bile ducts are normal in appearance.  There is similar enlargement of the liver.  No convincing acute process or suspicious focal abnormality is identified at the upper abdominal solid organs, by non-contrast appearance.  There is no radiodense urolithiasis or distal obstructive uropathy.  The urinary bladder is nondistended, limiting overall assessment.    The esophagus and stomach are without suspicious focal finding.  There are no abnormally dilated small bowel loops or discrete transition point to suggest mechanical obstruction.  The appendix is of mildly prominent diameter, measuring approximately 12 mm, with associated subtle regional inflammatory fat changes and disorganized fluid (series 2, images 55-61; series 8, images 41-46).  No periappendiceal air or organized fluid collection is appreciated to suggest gross perforation.  The colon is unremarkable throughout its course.    No pathologic lymph node enlargement or necrotic adenopathy is evident.    The body wall subcutaneous tissues and regional musculoskeletal structures are without acute or suspicious focal finding.    IMPRESSION  1. Findings suggestive of early acute appendicitis,  without evidence of gross perforation or drainable periappendiceal collection.  2. No other findings to suggest acute abdominopelvic process.  3. Chronic secondary details are similar to the 18 September 2021 CT appearance.  ==========    The above findings were discussed via telephone with CRISTINO Darby (Emergency Dept) prior to completion of the final report (10/21/2022; 17:36).    This report was flagged in Epic as abnormal.    RADIATION DOSE  Automated tube current modulation, weight-based exposure dosing, and/or iterative reconstruction technique utilized to reach lowest reasonably achievable exposure rate.    DLP: 502 mGy*cm      Electronically signed by: Levi Jones  Date:    10/21/2022  Time:    17:41                                     ASSESSMENT:     Mr. Murcia is a 32 y.o. male w/ no known medical hx who presented with a 2 day duration on N/V, anorexia, and severe RLQ pain radiating to lower abdomen. Recent CT abd/pelvis revealed acute appendicitis without perforation of periappendiceal fluid collection.     PLAN:     - Will admit to Surgery Team.  - IV maintenance fluids  - Multi-modal pain control  - IV antibiotics  - NPO  - Consented for laparoscopic appendectomy for tomorrow morning.   - Lovenox on call to OR tomorrow    Cady Allan MD  [employer] General Surgery PGY-1    I have reviewed and edited this note as appropriate.    Landen Choudhury MD  General Surgery [facility]

## 2022-10-21 NOTE — ED PROVIDER NOTES
"Encounter Date: 10/21/2022       History     Chief Complaint   Patient presents with    Flank Pain     Right flank pain radiating to the RUQ x 2 days. Reports increased pain after eating "something greasy". Seen at other local ER today and 2 days prior; filled Zofran today. Family reports no imaging was performed and they are concerned for gallbladder.     31 yo M w/ PMHx significant for obesity, smoking & daily marijuana use presents to ED c/o ongoing R sided abdominal pain w/ associated N/V for the past 3 days. Patient was seen at [facility] ED 2 days ago & had unremarkable bloodwork, but did have UA positive for marijuana & amphetamines. Patient was discharged w/ ibuprofen, zofran & phenergan, but reports these meds did not help. Patient returned to [facility] ED earlier today, but apparently became upset due to approx 30 min wait time & left AMA after ripping his own IV out. Patient reports R sided pain is still present, but denies any significant nausea currently. Denies hx of IVDU. Reports he has not used marijuana in 2 days. Reports alternating constipation & diarrhea. Denies blood in stool, abdominal distension, jaundice, anorexia, polyuria, polydipsia, dysuria, hematuria, testicular pain/swelling, decreased urine output, back pain, hematemesis, feculent emesis, F/C, cough, hemoptysis, congestion, rhinorrhea, sore throat, CP, SOB, palpitations. VSS on arrival w/ NAD.    The history is provided by the patient.   Review of patient's allergies indicates:  No Known Allergies  Past Medical History:   Diagnosis Date    At risk for alteration in comfort     Obesity, unspecified     Tobacco abuse      Past Surgical History:   Procedure Laterality Date    arm repair Right      Family History   Problem Relation Age of Onset    Hypertension Mother     No Known Problems Father      Social History     Tobacco Use    Smoking status: Every Day     Packs/day: 0.25     Types: Cigarettes    Smokeless tobacco: Never   Substance Use Topics "    Alcohol use: Yes    Drug use: Yes     Frequency: 7.0 times per week     Types: Marijuana     Review of Systems   Constitutional:  Negative for appetite change, chills, diaphoresis, fatigue, fever and unexpected weight change.   HENT:  Negative for congestion, rhinorrhea and sore throat.    Eyes:  Negative for photophobia and visual disturbance.   Respiratory:  Negative for cough and shortness of breath.    Cardiovascular:  Negative for chest pain, palpitations and leg swelling.   Gastrointestinal:  Positive for abdominal pain, constipation, diarrhea, nausea and vomiting. Negative for abdominal distention and blood in stool.   Endocrine: Negative for polydipsia and polyuria.   Genitourinary:  Positive for flank pain. Negative for decreased urine volume, difficulty urinating, dysuria, frequency, hematuria, penile discharge, scrotal swelling, testicular pain and urgency.   Musculoskeletal:  Negative for arthralgias, back pain, myalgias, neck pain and neck stiffness.   Skin:  Negative for color change, pallor and rash.   Allergic/Immunologic: Negative for food allergies and immunocompromised state.   Neurological:  Negative for dizziness, seizures, syncope, weakness, light-headedness, numbness and headaches.   Hematological:  Negative for adenopathy. Does not bruise/bleed easily.   Psychiatric/Behavioral:  Negative for confusion.    All other systems reviewed and are negative.    Physical Exam     Initial Vitals [10/21/22 1641]   BP Pulse Resp Temp SpO2   (!) 150/70 (!) 59 16 98.2 °F (36.8 °C) 99 %      MAP       --         Physical Exam    Nursing note and vitals reviewed.  Constitutional: He appears well-developed and well-nourished. He is not diaphoretic. No distress.   HENT:   Head: Normocephalic and atraumatic.   Mouth/Throat: No oropharyngeal exudate.   Eyes: Conjunctivae and EOM are normal. Pupils are equal, round, and reactive to light. No scleral icterus.   Neck: Neck supple. No thyromegaly present.   Normal  range of motion.  Cardiovascular:  Normal rate, regular rhythm, normal heart sounds and intact distal pulses.     Exam reveals no gallop and no friction rub.       No murmur heard.  Pulmonary/Chest: Breath sounds normal. No respiratory distress. He has no wheezes. He has no rhonchi. He has no rales.   Abdominal: Abdomen is soft and flat. Bowel sounds are normal. He exhibits no distension, no fluid wave, no abdominal bruit, no pulsatile midline mass and no mass. There is abdominal tenderness (moderate) in the right lower quadrant. No hernia.   No right CVA tenderness.  No left CVA tenderness. There is tenderness at McBurney's point. There is no rebound, no guarding and negative Juares's sign. negative Rovsing's sign  Musculoskeletal:         General: No tenderness or edema. Normal range of motion.      Cervical back: Normal range of motion and neck supple.     Lymphadenopathy:     He has no cervical adenopathy.   Neurological: He is alert and oriented to person, place, and time. No cranial nerve deficit or sensory deficit.   Skin: Skin is warm and dry. Capillary refill takes less than 2 seconds. No rash noted. No erythema. No pallor.   Psychiatric: His speech is normal. Judgment normal. His affect is angry. He is agitated and combative. He is not hyperactive and not actively hallucinating. Cognition and memory are normal. He expresses no homicidal and no suicidal ideation. He is attentive.       ED Course   Procedures  Labs Reviewed   COMPREHENSIVE METABOLIC PANEL - Abnormal; Notable for the following components:       Result Value    Carbon Dioxide 30 (*)     Globulin 3.6 (*)     All other components within normal limits   URINALYSIS, REFLEX TO URINE CULTURE - Abnormal; Notable for the following components:    Protein, UA 1+ (*)     Ketones, UA Trace (*)     Urobilinogen, UA 2+ (*)     Squamous Epithelial Cells, UA Trace (*)     Mucous, UA Moderate (*)     All other components within normal limits   CBC WITH  DIFFERENTIAL - Abnormal; Notable for the following components:    WBC 12.3 (*)     Neut # 9.9 (*)     IG# 0.08 (*)     All other components within normal limits   LIPASE - Normal   BLOOD CULTURE OLG   BLOOD CULTURE OLG   CBC W/ AUTO DIFFERENTIAL    Narrative:     The following orders were created for panel order CBC Auto Differential.  Procedure                               Abnormality         Status                     ---------                               -----------         ------                     CBC with Differential[[id number]]        Abnormal            Final result                 Please view results for these tests on the individual orders.   EXTRA TUBES    Narrative:     The following orders were created for panel order EXTRA TUBES.  Procedure                               Abnormality         Status                     ---------                               -----------         ------                     Red Top Hold[[id number]]                                     In process                   Please view results for these tests on the individual orders.   RED TOP HOLD   LACTIC ACID, PLASMA          Imaging Results               CT Abdomen Pelvis  Without Contrast (Final result)  Result time 10/21/22 17:41:27      Final result by Levi Jones MD (10/21/22 17:41:27)                   Narrative:    EXAMINATION  CT ABDOMEN PELVIS WITHOUT CONTRAST    CLINICAL HISTORY  Flank pain, kidney stone suspected;  right flank pain    TECHNIQUE  Non-contrast helical-acquisition CT images were obtained and multiplanar reformats accomplished by a CT technologist at a separate workstation, pushed to PACS for physician review.    Enteric contrast: none utilized    COMPARISON  18 September 2021    FINDINGS  Images were reviewed in soft tissue, lung, and bone windows.    Exam quality: Inherently limited evaluation of the abdominopelvic organs and vasculature secondary to non-contrast protocol.  Limited secondary to  patient movement throughout image acquisition, with resulting artifact.    Lines/tubes: none visualized    No acute or new focal abnormality is visualized at the included lower thoracic cavity.  No significant pleural or pericardial fluid is identified.  Regional vascular structures are unremarkable.    The gallbladder and bile ducts are normal in appearance.  There is similar enlargement of the liver.  No convincing acute process or suspicious focal abnormality is identified at the upper abdominal solid organs, by non-contrast appearance.  There is no radiodense urolithiasis or distal obstructive uropathy.  The urinary bladder is nondistended, limiting overall assessment.    The esophagus and stomach are without suspicious focal finding.  There are no abnormally dilated small bowel loops or discrete transition point to suggest mechanical obstruction.  The appendix is of mildly prominent diameter, measuring approximately 12 mm, with associated subtle regional inflammatory fat changes and disorganized fluid (series 2, images 55-61; series 8, images 41-46).  No periappendiceal air or organized fluid collection is appreciated to suggest gross perforation.  The colon is unremarkable throughout its course.    No pathologic lymph node enlargement or necrotic adenopathy is evident.    The body wall subcutaneous tissues and regional musculoskeletal structures are without acute or suspicious focal finding.    IMPRESSION  1. Findings suggestive of early acute appendicitis, without evidence of gross perforation or drainable periappendiceal collection.  2. No other findings to suggest acute abdominopelvic process.  3. Chronic secondary details are similar to the 18 September 2021 CT appearance.  ==========    The above findings were discussed via telephone with CRISTINO Darby (Emergency Dept) prior to completion of the final report (10/21/2022; 17:36).    This report was flagged in Epic as abnormal.    RADIATION  DOSE  Automated tube current modulation, weight-based exposure dosing, and/or iterative reconstruction technique utilized to reach lowest reasonably achievable exposure rate.    DLP: 502 mGy*cm      Electronically signed by: Levi Jones  Date:    10/21/2022  Time:    17:41                                     Medications   morphine injection 4 mg (has no administration in time range)   dicyclomine injection 20 mg (20 mg Intramuscular Given 10/21/22 1731)   ketorolac injection 30 mg (30 mg Intramuscular Given 10/21/22 1731)   0.9%  NaCl infusion (1,000 mLs Intravenous New Bag 10/21/22 1813)   ondansetron injection 4 mg (4 mg Intravenous Given 10/21/22 1812)     Medical Decision Making:   Clinical Tests:   Lab Tests: Ordered and Reviewed  Radiological Study: Ordered and Reviewed  Other:   I have discussed this case with another health care provider.       <> Summary of the Discussion: Case discussed w/ Dr. Morales & he reviewed today's diagnostic workup & performed face-to-face evaluation at bedside. All of his recommendations were followed.          Attending Attestation:     Physician Attestation Statement for NP/PA:   I have conducted a face to face encounter with this patient in addition to the NP/PA, due to Medical Complexity    Other NP/PA Attestation Additions:    History of Present Illness: Presents with 3 day history of RLQ pain associated to nausea and vomiting.    Physical Exam: RLQ tenderness with guarding; McBurney tenderness   Medical Decision Making: WBC elevated, CT suggestive of erly appendicitis for which will consult surgery for surgical management           ED Course as of 10/21/22 1850   Fri Oct 21, 2022   1755 CT concerning for early acute appendicitis. Patient is stable currently w/ normal vitals. Additional pain meds & zofran ordered. Additional orders include IVFs, blood cultures x2, lactic & NPO. Discussed results w/ patient. Discussed w/ charge nurse moving patient to acute side of ED. Contacted  Dr. Allan on-call for surgery & she will come evaluate patient in ED. Discussed abx w/ Dr. Allan & how I do not personally see any indication for abx at this time & she agrees. [NB]      ED Course User Index  [NB] CRISTINO Garcia                   Clinical Impression:   Final diagnoses:  [R10.9] Right sided abdominal pain  [K35.80] Acute appendicitis, unspecified acute appendicitis type (Primary)  [R11.2] Nausea and vomiting, unspecified vomiting type      ED Disposition Condition    Admit Stable                Antonio Jason MD  10/21/22 3848

## 2022-10-22 ENCOUNTER — ANESTHESIA (OUTPATIENT)
Dept: SURGERY | Facility: HOSPITAL | Age: 32
DRG: 343 | End: 2022-10-22

## 2022-10-22 ENCOUNTER — ANESTHESIA EVENT (OUTPATIENT)
Dept: SURGERY | Facility: HOSPITAL | Age: 32
DRG: 343 | End: 2022-10-22

## 2022-10-22 LAB
ALBUMIN SERPL-MCNC: 3.1 GM/DL (ref 3.5–5)
ALBUMIN/GLOB SERPL: 1.1 RATIO (ref 1.1–2)
ALP SERPL-CCNC: 45 UNIT/L (ref 40–150)
ALT SERPL-CCNC: 24 UNIT/L (ref 0–55)
AST SERPL-CCNC: 31 UNIT/L (ref 5–34)
BASOPHILS # BLD AUTO: 0.03 X10(3)/MCL (ref 0–0.2)
BASOPHILS NFR BLD AUTO: 0.3 %
BILIRUBIN DIRECT+TOT PNL SERPL-MCNC: 1.1 MG/DL
BUN SERPL-MCNC: 8 MG/DL (ref 8.9–20.6)
CALCIUM SERPL-MCNC: 9.1 MG/DL (ref 8.4–10.2)
CHLORIDE SERPL-SCNC: 103 MMOL/L (ref 98–107)
CO2 SERPL-SCNC: 28 MMOL/L (ref 22–29)
CREAT SERPL-MCNC: 0.92 MG/DL (ref 0.73–1.18)
EOSINOPHIL # BLD AUTO: 0.14 X10(3)/MCL (ref 0–0.9)
EOSINOPHIL NFR BLD AUTO: 1.4 %
ERYTHROCYTE [DISTWIDTH] IN BLOOD BY AUTOMATED COUNT: 12 % (ref 11.5–17)
GFR SERPLBLD CREATININE-BSD FMLA CKD-EPI: >60 MLS/MIN/1.73/M2
GLOBULIN SER-MCNC: 2.9 GM/DL (ref 2.4–3.5)
GLUCOSE SERPL-MCNC: 77 MG/DL (ref 74–100)
HCT VFR BLD AUTO: 40.5 % (ref 42–52)
HGB BLD-MCNC: 13.8 GM/DL (ref 14–18)
IMM GRANULOCYTES # BLD AUTO: 0.03 X10(3)/MCL (ref 0–0.04)
IMM GRANULOCYTES NFR BLD AUTO: 0.3 %
LYMPHOCYTES # BLD AUTO: 3.29 X10(3)/MCL (ref 0.6–4.6)
LYMPHOCYTES NFR BLD AUTO: 33 %
MAGNESIUM SERPL-MCNC: 1.6 MG/DL (ref 1.6–2.6)
MCH RBC QN AUTO: 29.6 PG (ref 27–31)
MCHC RBC AUTO-ENTMCNC: 34.1 MG/DL (ref 33–36)
MCV RBC AUTO: 86.7 FL (ref 80–94)
MONOCYTES # BLD AUTO: 0.73 X10(3)/MCL (ref 0.1–1.3)
MONOCYTES NFR BLD AUTO: 7.3 %
NEUTROPHILS # BLD AUTO: 5.7 X10(3)/MCL (ref 2.1–9.2)
NEUTROPHILS NFR BLD AUTO: 57.7 %
NRBC BLD AUTO-RTO: 0 %
PHOSPHATE SERPL-MCNC: 3 MG/DL (ref 2.3–4.7)
PLATELET # BLD AUTO: 198 X10(3)/MCL (ref 130–400)
PMV BLD AUTO: 10.4 FL (ref 7.4–10.4)
POTASSIUM SERPL-SCNC: 3.4 MMOL/L (ref 3.5–5.1)
PROT SERPL-MCNC: 6 GM/DL (ref 6.4–8.3)
RBC # BLD AUTO: 4.67 X10(6)/MCL (ref 4.7–6.1)
SODIUM SERPL-SCNC: 140 MMOL/L (ref 136–145)
WBC # SPEC AUTO: 10 X10(3)/MCL (ref 4.5–11.5)

## 2022-10-22 PROCEDURE — 85025 COMPLETE CBC W/AUTO DIFF WBC: CPT | Performed by: STUDENT IN AN ORGANIZED HEALTH CARE EDUCATION/TRAINING PROGRAM

## 2022-10-22 PROCEDURE — 37000008 HC ANESTHESIA 1ST 15 MINUTES: Performed by: SURGERY

## 2022-10-22 PROCEDURE — 63600175 PHARM REV CODE 636 W HCPCS: Performed by: NURSE ANESTHETIST, CERTIFIED REGISTERED

## 2022-10-22 PROCEDURE — 25000003 PHARM REV CODE 250: Performed by: NURSE ANESTHETIST, CERTIFIED REGISTERED

## 2022-10-22 PROCEDURE — 11000001 HC ACUTE MED/SURG PRIVATE ROOM

## 2022-10-22 PROCEDURE — 25000003 PHARM REV CODE 250: Performed by: SURGERY

## 2022-10-22 PROCEDURE — 94761 N-INVAS EAR/PLS OXIMETRY MLT: CPT

## 2022-10-22 PROCEDURE — 88304 TISSUE EXAM BY PATHOLOGIST: CPT | Performed by: STUDENT IN AN ORGANIZED HEALTH CARE EDUCATION/TRAINING PROGRAM

## 2022-10-22 PROCEDURE — 63600175 PHARM REV CODE 636 W HCPCS: Performed by: STUDENT IN AN ORGANIZED HEALTH CARE EDUCATION/TRAINING PROGRAM

## 2022-10-22 PROCEDURE — 27201423 OPTIME MED/SURG SUP & DEVICES STERILE SUPPLY: Performed by: SURGERY

## 2022-10-22 PROCEDURE — 84100 ASSAY OF PHOSPHORUS: CPT | Performed by: STUDENT IN AN ORGANIZED HEALTH CARE EDUCATION/TRAINING PROGRAM

## 2022-10-22 PROCEDURE — 83735 ASSAY OF MAGNESIUM: CPT | Performed by: STUDENT IN AN ORGANIZED HEALTH CARE EDUCATION/TRAINING PROGRAM

## 2022-10-22 PROCEDURE — 25000003 PHARM REV CODE 250

## 2022-10-22 PROCEDURE — 25000003 PHARM REV CODE 250: Performed by: STUDENT IN AN ORGANIZED HEALTH CARE EDUCATION/TRAINING PROGRAM

## 2022-10-22 PROCEDURE — 36000709 HC OR TIME LEV III EA ADD 15 MIN: Performed by: SURGERY

## 2022-10-22 PROCEDURE — 36415 COLL VENOUS BLD VENIPUNCTURE: CPT | Performed by: STUDENT IN AN ORGANIZED HEALTH CARE EDUCATION/TRAINING PROGRAM

## 2022-10-22 PROCEDURE — 71000033 HC RECOVERY, INTIAL HOUR: Performed by: SURGERY

## 2022-10-22 PROCEDURE — 63600175 PHARM REV CODE 636 W HCPCS: Performed by: SPECIALIST

## 2022-10-22 PROCEDURE — 37000009 HC ANESTHESIA EA ADD 15 MINS: Performed by: SURGERY

## 2022-10-22 PROCEDURE — 25000003 PHARM REV CODE 250: Performed by: SPECIALIST

## 2022-10-22 PROCEDURE — 36000708 HC OR TIME LEV III 1ST 15 MIN: Performed by: SURGERY

## 2022-10-22 PROCEDURE — 80053 COMPREHEN METABOLIC PANEL: CPT | Performed by: STUDENT IN AN ORGANIZED HEALTH CARE EDUCATION/TRAINING PROGRAM

## 2022-10-22 PROCEDURE — 63600175 PHARM REV CODE 636 W HCPCS

## 2022-10-22 RX ORDER — NEOSTIGMINE METHYLSULFATE 1 MG/ML
INJECTION, SOLUTION INTRAVENOUS
Status: DISCONTINUED | OUTPATIENT
Start: 2022-10-22 | End: 2022-10-22

## 2022-10-22 RX ORDER — KETOROLAC TROMETHAMINE 30 MG/ML
INJECTION, SOLUTION INTRAMUSCULAR; INTRAVENOUS
Status: DISCONTINUED | OUTPATIENT
Start: 2022-10-22 | End: 2022-10-22

## 2022-10-22 RX ORDER — BUPIVACAINE HYDROCHLORIDE 2.5 MG/ML
INJECTION, SOLUTION EPIDURAL; INFILTRATION; INTRACAUDAL
Status: DISCONTINUED | OUTPATIENT
Start: 2022-10-22 | End: 2022-10-22 | Stop reason: HOSPADM

## 2022-10-22 RX ORDER — LIDOCAINE HYDROCHLORIDE 20 MG/ML
INJECTION INTRAVENOUS
Status: DISCONTINUED | OUTPATIENT
Start: 2022-10-22 | End: 2022-10-22

## 2022-10-22 RX ORDER — PROPOFOL 10 MG/ML
VIAL (ML) INTRAVENOUS
Status: DISCONTINUED | OUTPATIENT
Start: 2022-10-22 | End: 2022-10-22

## 2022-10-22 RX ORDER — DEXAMETHASONE SODIUM PHOSPHATE 4 MG/ML
INJECTION, SOLUTION INTRA-ARTICULAR; INTRALESIONAL; INTRAMUSCULAR; INTRAVENOUS; SOFT TISSUE
Status: DISCONTINUED | OUTPATIENT
Start: 2022-10-22 | End: 2022-10-22

## 2022-10-22 RX ORDER — HYDROCODONE BITARTRATE AND ACETAMINOPHEN 5; 325 MG/1; MG/1
1 TABLET ORAL EVERY 6 HOURS PRN
Qty: 10 TABLET | Refills: 0 | Status: SHIPPED | OUTPATIENT
Start: 2022-10-22 | End: 2022-10-22 | Stop reason: HOSPADM

## 2022-10-22 RX ORDER — LABETALOL HYDROCHLORIDE 5 MG/ML
INJECTION, SOLUTION INTRAVENOUS
Status: DISCONTINUED | OUTPATIENT
Start: 2022-10-22 | End: 2022-10-22

## 2022-10-22 RX ORDER — HYDROMORPHONE HYDROCHLORIDE 1 MG/ML
0.5 INJECTION, SOLUTION INTRAMUSCULAR; INTRAVENOUS; SUBCUTANEOUS EVERY 5 MIN PRN
Status: DISCONTINUED | OUTPATIENT
Start: 2022-10-22 | End: 2022-10-22

## 2022-10-22 RX ORDER — MIDAZOLAM HYDROCHLORIDE 1 MG/ML
2 INJECTION INTRAMUSCULAR; INTRAVENOUS ONCE AS NEEDED
Status: DISCONTINUED | OUTPATIENT
Start: 2022-10-22 | End: 2022-10-23 | Stop reason: HOSPADM

## 2022-10-22 RX ORDER — MEPERIDINE HYDROCHLORIDE 25 MG/ML
12.5 INJECTION INTRAMUSCULAR; INTRAVENOUS; SUBCUTANEOUS ONCE
Status: DISCONTINUED | OUTPATIENT
Start: 2022-10-22 | End: 2022-10-22

## 2022-10-22 RX ORDER — GLYCOPYRROLATE 0.2 MG/ML
INJECTION INTRAMUSCULAR; INTRAVENOUS
Status: DISCONTINUED | OUTPATIENT
Start: 2022-10-22 | End: 2022-10-22

## 2022-10-22 RX ORDER — FENTANYL CITRATE 50 UG/ML
INJECTION, SOLUTION INTRAMUSCULAR; INTRAVENOUS
Status: DISCONTINUED | OUTPATIENT
Start: 2022-10-22 | End: 2022-10-22

## 2022-10-22 RX ORDER — OXYCODONE AND ACETAMINOPHEN 5; 325 MG/1; MG/1
2 TABLET ORAL ONCE
Status: COMPLETED | OUTPATIENT
Start: 2022-10-22 | End: 2022-10-22

## 2022-10-22 RX ORDER — SODIUM CHLORIDE, SODIUM LACTATE, POTASSIUM CHLORIDE, CALCIUM CHLORIDE 600; 310; 30; 20 MG/100ML; MG/100ML; MG/100ML; MG/100ML
INJECTION, SOLUTION INTRAVENOUS CONTINUOUS
Status: DISCONTINUED | OUTPATIENT
Start: 2022-10-22 | End: 2022-10-23 | Stop reason: HOSPADM

## 2022-10-22 RX ORDER — PROCHLORPERAZINE EDISYLATE 5 MG/ML
5 INJECTION INTRAMUSCULAR; INTRAVENOUS ONCE AS NEEDED
Status: DISCONTINUED | OUTPATIENT
Start: 2022-10-22 | End: 2022-10-22

## 2022-10-22 RX ORDER — ONDANSETRON 2 MG/ML
4 INJECTION INTRAMUSCULAR; INTRAVENOUS ONCE
Status: DISCONTINUED | OUTPATIENT
Start: 2022-10-22 | End: 2022-10-22

## 2022-10-22 RX ORDER — BUPIVACAINE HYDROCHLORIDE 2.5 MG/ML
INJECTION, SOLUTION EPIDURAL; INFILTRATION; INTRACAUDAL
Status: DISPENSED
Start: 2022-10-22 | End: 2022-10-22

## 2022-10-22 RX ORDER — HYDROMORPHONE HYDROCHLORIDE 1 MG/ML
0.2 INJECTION, SOLUTION INTRAMUSCULAR; INTRAVENOUS; SUBCUTANEOUS EVERY 5 MIN PRN
Status: DISCONTINUED | OUTPATIENT
Start: 2022-10-22 | End: 2022-10-22

## 2022-10-22 RX ORDER — ROCURONIUM BROMIDE 10 MG/ML
INJECTION, SOLUTION INTRAVENOUS
Status: DISCONTINUED | OUTPATIENT
Start: 2022-10-22 | End: 2022-10-22

## 2022-10-22 RX ORDER — IPRATROPIUM BROMIDE AND ALBUTEROL SULFATE 2.5; .5 MG/3ML; MG/3ML
3 SOLUTION RESPIRATORY (INHALATION) ONCE AS NEEDED
Status: DISCONTINUED | OUTPATIENT
Start: 2022-10-22 | End: 2022-10-22

## 2022-10-22 RX ORDER — HYDROCODONE BITARTRATE AND ACETAMINOPHEN 5; 325 MG/1; MG/1
1 TABLET ORAL EVERY 6 HOURS PRN
Qty: 10 TABLET | Refills: 0 | Status: SHIPPED | OUTPATIENT
Start: 2022-10-22 | End: 2022-10-29

## 2022-10-22 RX ORDER — DIPHENHYDRAMINE HYDROCHLORIDE 50 MG/ML
25 INJECTION INTRAMUSCULAR; INTRAVENOUS ONCE AS NEEDED
Status: DISCONTINUED | OUTPATIENT
Start: 2022-10-22 | End: 2022-10-22

## 2022-10-22 RX ORDER — ONDANSETRON 2 MG/ML
INJECTION INTRAMUSCULAR; INTRAVENOUS
Status: DISCONTINUED | OUTPATIENT
Start: 2022-10-22 | End: 2022-10-22

## 2022-10-22 RX ORDER — HEPARIN SODIUM 5000 [USP'U]/ML
INJECTION, SOLUTION INTRAVENOUS; SUBCUTANEOUS
Status: COMPLETED
Start: 2022-10-22 | End: 2022-10-22

## 2022-10-22 RX ORDER — KETAMINE HYDROCHLORIDE 10 MG/ML
INJECTION, SOLUTION INTRAMUSCULAR; INTRAVENOUS
Status: DISCONTINUED | OUTPATIENT
Start: 2022-10-22 | End: 2022-10-22

## 2022-10-22 RX ORDER — HYDROMORPHONE HYDROCHLORIDE 1 MG/ML
INJECTION, SOLUTION INTRAMUSCULAR; INTRAVENOUS; SUBCUTANEOUS
Status: DISPENSED
Start: 2022-10-22 | End: 2022-10-22

## 2022-10-22 RX ADMIN — PIPERACILLIN AND TAZOBACTAM 4.5 G: 4; .5 INJECTION, POWDER, LYOPHILIZED, FOR SOLUTION INTRAVENOUS; PARENTERAL at 08:10

## 2022-10-22 RX ADMIN — DEXAMETHASONE SODIUM PHOSPHATE 8 MG: 4 INJECTION, SOLUTION INTRA-ARTICULAR; INTRALESIONAL; INTRAMUSCULAR; INTRAVENOUS; SOFT TISSUE at 07:10

## 2022-10-22 RX ADMIN — OXYCODONE HYDROCHLORIDE 5 MG: 5 TABLET ORAL at 10:10

## 2022-10-22 RX ADMIN — FENTANYL CITRATE 50 MCG: 50 INJECTION, SOLUTION INTRAMUSCULAR; INTRAVENOUS at 07:10

## 2022-10-22 RX ADMIN — LABETALOL HYDROCHLORIDE 2.5 MG: 5 INJECTION, SOLUTION INTRAVENOUS at 07:10

## 2022-10-22 RX ADMIN — KETAMINE HYDROCHLORIDE 30 MG: 10 INJECTION, SOLUTION INTRAMUSCULAR; INTRAVENOUS at 07:10

## 2022-10-22 RX ADMIN — PROPOFOL 200 MG: 10 INJECTION, EMULSION INTRAVENOUS at 07:10

## 2022-10-22 RX ADMIN — OXYCODONE HYDROCHLORIDE 5 MG: 5 TABLET ORAL at 04:10

## 2022-10-22 RX ADMIN — NEOSTIGMINE METHYLSULFATE 5 MG: 1 INJECTION INTRAVENOUS at 08:10

## 2022-10-22 RX ADMIN — ONDANSETRON 4 MG: 2 INJECTION INTRAMUSCULAR; INTRAVENOUS at 08:10

## 2022-10-22 RX ADMIN — ROCURONIUM BROMIDE 50 MG: 10 SOLUTION INTRAVENOUS at 07:10

## 2022-10-22 RX ADMIN — PIPERACILLIN AND TAZOBACTAM 4.5 G: 4; .5 INJECTION, POWDER, LYOPHILIZED, FOR SOLUTION INTRAVENOUS; PARENTERAL at 03:10

## 2022-10-22 RX ADMIN — PIPERACILLIN AND TAZOBACTAM 4.5 G: 4; .5 INJECTION, POWDER, LYOPHILIZED, FOR SOLUTION INTRAVENOUS; PARENTERAL at 11:10

## 2022-10-22 RX ADMIN — HYDROMORPHONE HYDROCHLORIDE 0.2 MG: 1 INJECTION, SOLUTION INTRAMUSCULAR; INTRAVENOUS; SUBCUTANEOUS at 08:10

## 2022-10-22 RX ADMIN — SODIUM CHLORIDE, POTASSIUM CHLORIDE, SODIUM LACTATE AND CALCIUM CHLORIDE: 600; 310; 30; 20 INJECTION, SOLUTION INTRAVENOUS at 10:10

## 2022-10-22 RX ADMIN — OXYCODONE AND ACETAMINOPHEN 2 TABLET: 325; 5 TABLET ORAL at 09:10

## 2022-10-22 RX ADMIN — MORPHINE SULFATE 2 MG: 2 INJECTION, SOLUTION INTRAMUSCULAR; INTRAVENOUS at 12:10

## 2022-10-22 RX ADMIN — KETAMINE HYDROCHLORIDE 20 MG: 10 INJECTION, SOLUTION INTRAMUSCULAR; INTRAVENOUS at 08:10

## 2022-10-22 RX ADMIN — HEPARIN SODIUM 5000 UNITS: 5000 INJECTION INTRAVENOUS; SUBCUTANEOUS at 07:10

## 2022-10-22 RX ADMIN — HYDROMORPHONE HYDROCHLORIDE 0.2 MG: 1 INJECTION, SOLUTION INTRAMUSCULAR; INTRAVENOUS; SUBCUTANEOUS at 09:10

## 2022-10-22 RX ADMIN — LABETALOL HYDROCHLORIDE 2.5 MG: 5 INJECTION, SOLUTION INTRAVENOUS at 08:10

## 2022-10-22 RX ADMIN — ONDANSETRON 4 MG: 2 INJECTION INTRAMUSCULAR; INTRAVENOUS at 01:10

## 2022-10-22 RX ADMIN — FENTANYL CITRATE 50 MCG: 50 INJECTION, SOLUTION INTRAMUSCULAR; INTRAVENOUS at 08:10

## 2022-10-22 RX ADMIN — KETOROLAC TROMETHAMINE 30 MG: 30 INJECTION, SOLUTION INTRAMUSCULAR; INTRAVENOUS at 08:10

## 2022-10-22 RX ADMIN — SODIUM CHLORIDE, POTASSIUM CHLORIDE, SODIUM LACTATE AND CALCIUM CHLORIDE: 600; 310; 30; 20 INJECTION, SOLUTION INTRAVENOUS at 05:10

## 2022-10-22 RX ADMIN — LIDOCAINE HYDROCHLORIDE 75 MG: 20 INJECTION, SOLUTION INTRAVENOUS at 07:10

## 2022-10-22 RX ADMIN — GLYCOPYRROLATE 0.8 MG: 0.2 INJECTION INTRAMUSCULAR; INTRAVENOUS at 08:10

## 2022-10-22 RX ADMIN — LIDOCAINE HYDROCHLORIDE 75 MG: 20 INJECTION, SOLUTION INTRAVENOUS at 08:10

## 2022-10-22 NOTE — PROGRESS NOTES
GENERAL SURGERY  PROGRESS NOTE    Admit Date: 10/21/2022  Hospital Day: 1  Procedure:  0    24 hour events:  No acute events overnight  Aferbile, VSS  Abdominal pain is stable  Denies N/V  0 BMs overnight      Physical Exam:  General: No acute distress  Neck: trachea midline  Chest: equal chest rise  Abdomen: non-distended, severe tenderness to RLQ radiating to the back; negative for Rosving sign. No rebound tenderness.  MSK: Full ROM to all extremities.     Assessment/Plan:  Mr. Murcia is a 32 y.o. male w/ no known medical hx who presented with a 2 day duration on N/V, anorexia, and severe RLQ pain radiating to lower abdomen. Recent CT abd/pelvis revealed acute appendicitis without perforation of periappendiceal fluid collection.     - Plan for laparoscopic appendectomy this morning.   - IV maintenance fluids  - Multi-modal pain control  - Antiemetic meds  - Currenlty on zosyn  - NPO  - Lovenox on call to OR tomorrow    Cady Allan MD  [employer] General Surgery PGY-1     Inpatient Data      Vitals:   Temp:  [98.2 °F (36.8 °C)-99 °F (37.2 °C)]   Pulse:  [58-90]   Resp:  [16-20]   BP: (120-150)/(63-86)   SpO2:  [97 %-100 %]     Diet NPO  Diet NPO   Intake/Output Summary (Last 24 hours) at 10/22/2022 0540  Last data filed at 10/22/2022 0051  Gross per 24 hour   Intake 1616.67 ml   Output 600 ml   Net 1016.67 ml          Recent Labs     10/19/22  2109 10/21/22  1712   WBC 13.6* 12.3*   HGB 15.5 15.9   HCT 45.7 47.1    223    141   K 3.9 3.6   CO2 31* 30*   BUN 10.1 10.3   CREATININE 0.95 0.93   BILITOT 0.5 0.8   AST 24 31   ALT 24 32   ALKPHOS 61 57   CALCIUM 9.8 9.9   ALBUMIN 4.0 3.8   MG 1.80  --      Imaging:  - CT Abd/pelvis (10/21):  IMPRESSION  1. Findings suggestive of early acute appendicitis, without evidence of gross perforation or drainable periappendiceal collection.  2. No other findings to suggest acute abdominopelvic process.  3. Chronic secondary details are similar to the 18 September 2021  CT appearance.    Scheduled Meds: piperacillin-tazobactam (ZOSYN) IVPB, 4.5 g, Q8H     lactated ringers 125 mL/hr at 10/22/22 5216

## 2022-10-22 NOTE — MEDICAL/APP STUDENT
GENERAL SURGERY  PROGRESS NOTE    Admit Date: 10/21/2022  Hospital Day: 1  Procedure:   Laparoscopic Appendectomy    24 hour events:  Figueroa Murcia is a 32 M with no significant PMH who presented to the ED with 2 days of N/V, anorexia, and RLQ pain that radiates to his back. He has been seen at an outside facility twice for evaluation but was discharged with zofran before coming to [facility]. CT in ED showed acute appendicitis w/o perforation or periappendiceal fluid.     He denies CP, diarrhea, constipation, N/V. Resting comfortably in bed. Stated that he understood the procedure and had no additional questions.      Physical Exam:  General: No acute distress  Neck: trachea midline  Chest: equal chest rise  Abdomen: Tender to deep palpation in RLQ and LLQ. ND.  MSK: Full ROM to all extremities.     Assessment/Plan:  Figueroa Murcia is a 32 M with no significant PMH who presented to the ED with 2 days of N/V, anorexia, and RLQ pain that radiates to his back.     -NPO in preparation for a laparoscopic appendectomy today. Pt consented in ED.   -Currently on maintenance fluid and a pain control regimen.  -IV antibiotics started last night    Inpatient Data      Vitals:   Temp:  [98.2 °F (36.8 °C)-99 °F (37.2 °C)]   Pulse:  [58-90]   Resp:  [16-20]   BP: (120-150)/(63-86)   SpO2:  [97 %-100 %]     Diet NPO  Diet NPO   Intake/Output Summary (Last 24 hours) at 10/22/2022 0625  Last data filed at 10/22/2022 0051  Gross per 24 hour   Intake 1616.67 ml   Output 600 ml   Net 1016.67 ml          Recent Labs     10/19/22  2109 10/21/22  1712 10/22/22  0441   WBC 13.6* 12.3* 10.0   HGB 15.5 15.9 13.8*   HCT 45.7 47.1 40.5*    223 198    141 140   K 3.9 3.6 3.4*   CO2 31* 30* 28   BUN 10.1 10.3 8.0*   CREATININE 0.95 0.93 0.92   BILITOT 0.5 0.8 1.1   AST 24 31 31   ALT 24 32 24   ALKPHOS 61 57 45   CALCIUM 9.8 9.9 9.1   ALBUMIN 4.0 3.8 3.1*   MG 1.80  --  1.60   PHOS  --   --  3.0     Scheduled Meds: piperacillin-tazobactam  (ZOSYN) IVPB, 4.5 g, Q8H     lactated ringers 125 mL/hr at 10/22/22 0858

## 2022-10-22 NOTE — ANESTHESIA PROCEDURE NOTES
Intubation    Date/Time: 10/22/2022 7:32 AM  Performed by: Stevie Roque CRNA  Authorized by: Tita Zaidi MD     Intubation:     Induction:  Intravenous    Intubated:  Postinduction    Mask Ventilation:  Easy with oral airway    Attempts:  1    Attempted By:  CRNA    Method of Intubation:  Direct    Blade:  Alonso 3    Laryngeal View Grade: Grade I - full view of cords      Difficult Airway Encountered?: No      Complications:  None    Airway Device:  Oral endotracheal tube    Airway Device Size:  7.5    Style/Cuff Inflation:  Cuffed (inflated to minimal occlusive pressure)    Inflation Amount (mL):  8    Tube secured:  22    Secured at:  The lips    Placement Verified By:  Capnometry    Complicating Factors:  None    Findings Post-Intubation:  BS equal bilateral and atraumatic/condition of teeth unchanged

## 2022-10-22 NOTE — TRANSFER OF CARE
"Anesthesia Transfer of Care Note    Patient: Figueroa Murcia    Procedure(s) Performed: Procedure(s) (LRB):  APPENDECTOMY, LAPAROSCOPIC (N/A)    Patient location: PACU    Anesthesia Type: general    Transport from OR: Transported from OR on room air with adequate spontaneous ventilation    Post pain: adequate analgesia    Post assessment: no apparent anesthetic complications and tolerated procedure well    Post vital signs: stable    Level of consciousness: sedated    Nausea/Vomiting: no nausea/vomiting    Complications: none    Transfer of care protocol was followed      Last vitals:   Visit Vitals  /82   Pulse 68   Temp 36.6 °C (97.9 °F) (Temporal)   Resp 20   Ht 5' 11" (1.803 m)   Wt 112.3 kg (247 lb 9.2 oz)   SpO2 96%   BMI 34.53 kg/m²     "

## 2022-10-22 NOTE — PLAN OF CARE
Problem: Adult Inpatient Plan of Care  Goal: Plan of Care Review  Outcome: Ongoing, Progressing  Goal: Patient-Specific Goal (Individualized)  Outcome: Ongoing, Progressing  Goal: Absence of Hospital-Acquired Illness or Injury  Outcome: Ongoing, Progressing  Goal: Optimal Comfort and Wellbeing  Outcome: Ongoing, Progressing  Goal: Readiness for Transition of Care  Outcome: Ongoing, Progressing     Problem: Nausea and Vomiting  Goal: Fluid and Electrolyte Balance  Outcome: Ongoing, Progressing     Problem: Pain Acute  Goal: Acceptable Pain Control and Functional Ability  Outcome: Ongoing, Progressing

## 2022-10-22 NOTE — ANESTHESIA PREPROCEDURE EVALUATION
10/22/2022  Figueroa Murcia is a 32 y.o., male for lap appy    History of Smoking    Vitals:    10/22/22 0011 10/22/22 0110 10/22/22 0304 10/22/22 0644   BP:   120/63 132/82   BP Location:    Left arm   Patient Position:    Lying   Pulse:   68 68   Resp: 20 18 20   Temp:   36.8 °C (98.3 °F) 36.6 °C (97.9 °F)   TempSrc:   Oral Temporal   SpO2:  98% 98% 96%   Weight:       Height:           Lab Results   Component Value Date    WBC 10.0 10/22/2022    HGB 13.8 (L) 10/22/2022    HCT 40.5 (L) 10/22/2022     10/22/2022    CHOL 201 (H) 07/21/2022    TRIG 93 07/21/2022    HDL 42 07/21/2022    ALT 24 10/22/2022    AST 31 10/22/2022     10/22/2022    K 3.4 (L) 10/22/2022    CREATININE 0.92 10/22/2022    BUN 8.0 (L) 10/22/2022    CO2 28 10/22/2022    TSH 0.6284 07/21/2022    INR 0.92 (L) 10/19/2022    HGBA1C 5.1 07/21/2022       Past Surgical History:   Procedure Laterality Date    arm repair Right          Active Ambulatory Problems     Diagnosis Date Noted    Acute stress reaction 07/18/2022    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      Pre-op Assessment    I have reviewed the Patient Summary Reports.     I have reviewed the Nursing Notes. I have reviewed the NPO Status.   I have reviewed the Medications.     Review of Systems  Anesthesia Hx:  No problems with previous Anesthesia  History of prior surgery of interest to airway management or planning: Denies Family Hx of Anesthesia complications.    [de-identified] : Patient was seen today for reevaluation of of back pain after work-related injury.  Patient was first seen for this issue on 9/22/2021 at which time he did report some mild cervicothoracic pain, but this has not been the primary issue that I have been managing since initial onset.  All subsequent visits were only related to low back pain as he did not really have any cervicothoracic or periscapular back pain since initial visit.  With no recent trauma or other injury patient suddenly developed severe cervicothoracic back pain that necessitated him to go to the emergency room, no imaging was performed, he was given muscle relaxants and oxycodone.  On clinical exam today patient has significantly limited cervicothoracic range of motion, I advised the patient I do not understand how clinically he would be progressing normally over the last almost 2 months and then suddenly he would wake up with severe cervicothoracic pain.  This does not fit any specific pattern, his reported history is inconsistent with his new clinical exam findings.  With the severity and sudden onset of the patient's pain I recommend we proceed with MRI of the cervical spine to evaluate for any spinal cord pathology.  I also recommend we proceed with MRI of the lumbar spine as patient has persisting pain and there may be some correlation between cervical and lumbar findings.  Patient is advised he should continue his course of physical therapy to address these myofascial issues.  Patient denies any fever, chills, he has no constitutional symptoms that would represent any sign of infection, nor does he have any severe headache that would be consistent with any other CSF, spinal or potential infectious etiology to his no onset of severe pain.  Patient will follow up as soon as MRI results are available, or within 2 weeks, whichever is sooner.  Patient appreciates and agrees with current plan.\par \par I work as part of an academic orthopedic group and routinely have a physician in training (resident / fellow) working with me.  Any part of the history and physical exam performed by the physician in training was either directly reviewed and/or replicated by myself.  Any procedure performed by the physician in training was performed under my direct supervision and with the consent of the patient.\par \par This note was generated using dragon medical dictation software.  A reasonable effort has been made for proofreading its contents, but typos may still remain.  If there are any questions or points of clarification needed please notify my office.\par  Denies Personal Hx of Anesthesia complications.   Hematology/Oncology:  Hematology Normal   Oncology Normal     EENT/Dental:EENT/Dental Normal   Cardiovascular:  Cardiovascular Normal     Pulmonary:  Pulmonary Normal    Renal/:  Renal/ Normal     Hepatic/GI:  Hepatic/GI Normal    Musculoskeletal:  Musculoskeletal Normal    Neurological:  Neurology Normal    Endocrine:  Endocrine Normal    Dermatological:  Skin Normal    Psych:  Psychiatric Normal           Physical Exam  General: Well nourished, Cooperative, Alert and Oriented    Airway:  Mallampati: I / I  Mouth Opening: Normal  TM Distance: Normal  Tongue: Large, Normal  Neck ROM: Normal ROM    Dental:  Intact        Anesthesia Plan  Type of Anesthesia, risks & benefits discussed:    Anesthesia Type: Gen ETT  Intra-op Monitoring Plan: Standard ASA Monitors  Post Op Pain Control Plan: multimodal analgesia and IV/PO Opioids PRN  Induction:  IV  Airway Plan: Direct  Informed Consent: Informed consent signed with the Patient and all parties understand the risks and agree with anesthesia plan.  All questions answered. Patient consented to blood products? No  ASA Score: 2  Day of Surgery Review of History & Physical: H&P Update referred to the surgeon/provider.    Ready For Surgery From Anesthesia Perspective.     .

## 2022-10-22 NOTE — PROGRESS NOTES
"Inpatient Nutrition Evaluation    Admit Date: 10/21/2022   Total duration of encounter: 1 day    Nutrition Recommendation/Prescription     Regular diet as tolerated  Monitor Weights Weekly     Nutrition Assessment     Chart Review    Reason Seen: malnutrition screening tool    Diagnosis:  Acute appendicitis s/p lap appy on 10/22    Relevant Medical History: Obesity, Marijuana Use    Nutrition-Related Medications: Zosyn    Nutrition-Related Labs:  10/22/22 -- K 3.4 L, BUN 8 L, Cr 0.9, Phos 3    Diet Order: Adult Regular  Oral Supplement Order: none  Appetite/Oral Intake: NPO/NPO  Factors Affecting Nutritional Intake: decreased appetite  Food/Rastafarian/Cultural Preferences: none reported  Food Allergies: none reported       Wound(s):   skin intact    Comments    10/22/22 -- Pt sleeping, unable to obtained subjective information from pt; nsing reports diet progression to regular for lunch today s/p surgery; 2-3 day h/o n/v per H&P; no indication of wt loss per EMR wt hx    Anthropometrics    Height: 5' 11" (180.3 cm)    Last Weight: 112.3 kg (247 lb 9.2 oz) (10/22/22 0644) Weight Method: Standard Scale  BMI (Calculated): 34.5  BMI Classification: obese grade I (BMI 30-34.9)        Ideal Body Weight (IBW), Male: 172 lb     % Ideal Body Weight, Male (lb): 143.94 %                          Usual Weight Provided By: not applicable    Wt Readings from Last 5 Encounters:   10/22/22 112.3 kg (247 lb 9.2 oz)   10/21/22 99.8 kg (220 lb)   10/19/22 99.8 kg (220 lb)   07/27/22 104.3 kg (230 lb)   07/21/22 104.3 kg (230 lb)     Weight Change(s) Since Admission:  Admit Weight: 112.3 kg (247 lb 9.2 oz) (10/21/22 1641)  No indication of wt loss per EMR wt hx    Patient Education    Not applicable.    Monitoring & Evaluation     Dietitian will monitor food and beverage intake, weight change, electrolyte/renal panel, and gastrointestinal profile.  Nutrition Risk/Follow-Up: low (follow-up in 5-7 days)  Patients assigned 'low nutrition " risk' status do not qualify for a full nutritional assessment but will be monitored and re-evaluated in a 5-7 day time period. Please consult if re-evaluation needed sooner.

## 2022-10-22 NOTE — OP NOTE
PATIENT NAME: Figueroa Murcia  MRN: [id number]  ADMIT DATE: 10/21/2022  PROCEDURE DATE: 10/22/22    SURGEON: Dr. Vinay Sims MD    RESIDENT: Landen Choudhury MD    PREOPERATIVE DIAGNOSES:  Acute Appendicitis without perforation    POSTOPERATIVE DIAGNOSES:  Acute Appendicitis without perforation    PROCEDURE PERFORMED:  Laparoscopic Appendectomy    INDICATION: This is a 31 yo M with no PMH who presented with four days of nausea, vomiting, anorexia, and right lower quadrant abdominal pain. CT scan confirmed acute appendicitis.    FINDINGS: Acute appendicitis with moderate inflammation at the body and tip of the appendix. No perforation. Some reactive fluid in pelvis.    ANESTHESIA: General Anesthesia     DRAINS: None    SPECIMEN: Appendix    ESTIMATED BLOOD LOSS: 30cc    COMPLICATIONS: None    TECHNIQUE:   Informed consented was obtained prior to the procedure. All risks, benefits, alternatives were explained in detail to the patient. The patient was wheeled into the operating theatre. Anesthesia induced the patient and performed endotracheal intubation. The patient was placed in the supine position. The abdomen was prepped and draped in the standard fashion. A formal timeout was held confirming correct patient, procedure, site, preoperative antibiotics, VTE prophylaxis, and all operating room staff.     A 1cm vertical incision was made just below the umbilicus. A veress needle was passed into the abdomen. Saline drop test confirmed placement in peritoneal cavity. The abdomen was insufflated to 15mmHg. A 10mm bladed trocar was placed into the abdomen. There was no evidence of iatrogenic injury upon inspection of the abdomen. The appendix was seen in the right lower quadrant with significant inflammation. Next a 10mm port was placed in the left lower quadrant under direct vision. A 5mm port was placed in the supraumbilical position under direct vision. The tip of the appendix was noted to have significant  inflammation. The base was relatively normal. The base of the appendix was retracted towards the abdominal wall. A maryland was used to create a window between the base of the appendix and the mesoappendix. A 45mm endoGIA blue load stapler was used to transect and staple the base of the appendix. A 45mm endoGIA white load stapler was used to transect the mesoappendix. The appendix was removed with an endocatch bag. Both staple lines were hemostatic. The pelvis was inspected and noted to have some reactive fluid. This was removed using a raytech. The raytech was also used to remove any residual blood clot from the right lower quadrant again confirming hemostasis. The left lower quadrant port was removed and the fascia was closed with 0 vicryl simple interrupted suture using a laparoscopic PMI. The other ports were removed the abdomen was desufflated. The fascia at the 10mm port site inferior to the umbilicus was closed with 0 vicryl figure of eight stitch. The skin at each incision was closed with 4-0 monocryl subcuticular stitch. A total of 20cc of 0.25% bupivicaine was used to anesthetize the skin. Dermabond was used to dress the incisions.    At the conclusion of the procedure all counts were correct x 2. The patient tolerated the procedure well, was extubated and transferred to PACU in stable condition.

## 2022-10-23 VITALS
SYSTOLIC BLOOD PRESSURE: 129 MMHG | OXYGEN SATURATION: 98 % | BODY MASS INDEX: 34.66 KG/M2 | DIASTOLIC BLOOD PRESSURE: 64 MMHG | HEIGHT: 71 IN | RESPIRATION RATE: 18 BRPM | WEIGHT: 247.56 LBS | HEART RATE: 78 BPM | TEMPERATURE: 98 F

## 2022-10-23 PROBLEM — Z90.49 S/P LAPAROSCOPIC APPENDECTOMY: Status: ACTIVE | Noted: 2022-10-23

## 2022-10-23 LAB
ALBUMIN SERPL-MCNC: 3.2 GM/DL (ref 3.5–5)
ALBUMIN/GLOB SERPL: 1.2 RATIO (ref 1.1–2)
ALP SERPL-CCNC: 44 UNIT/L (ref 40–150)
ALT SERPL-CCNC: 24 UNIT/L (ref 0–55)
AST SERPL-CCNC: 50 UNIT/L (ref 5–34)
BASOPHILS # BLD AUTO: 0.02 X10(3)/MCL (ref 0–0.2)
BASOPHILS NFR BLD AUTO: 0.2 %
BILIRUBIN DIRECT+TOT PNL SERPL-MCNC: 1.3 MG/DL
BUN SERPL-MCNC: 8.6 MG/DL (ref 8.9–20.6)
CALCIUM SERPL-MCNC: 8.9 MG/DL (ref 8.4–10.2)
CHLORIDE SERPL-SCNC: 105 MMOL/L (ref 98–107)
CO2 SERPL-SCNC: 29 MMOL/L (ref 22–29)
CREAT SERPL-MCNC: 0.88 MG/DL (ref 0.73–1.18)
EOSINOPHIL # BLD AUTO: 0.01 X10(3)/MCL (ref 0–0.9)
EOSINOPHIL NFR BLD AUTO: 0.1 %
ERYTHROCYTE [DISTWIDTH] IN BLOOD BY AUTOMATED COUNT: 11.6 % (ref 11.5–17)
GFR SERPLBLD CREATININE-BSD FMLA CKD-EPI: >60 MLS/MIN/1.73/M2
GLOBULIN SER-MCNC: 2.6 GM/DL (ref 2.4–3.5)
GLUCOSE SERPL-MCNC: 87 MG/DL (ref 74–100)
HCT VFR BLD AUTO: 38.6 % (ref 42–52)
HGB BLD-MCNC: 13.3 GM/DL (ref 14–18)
IMM GRANULOCYTES # BLD AUTO: 0.04 X10(3)/MCL (ref 0–0.04)
IMM GRANULOCYTES NFR BLD AUTO: 0.5 %
LYMPHOCYTES # BLD AUTO: 2.49 X10(3)/MCL (ref 0.6–4.6)
LYMPHOCYTES NFR BLD AUTO: 30.5 %
MAGNESIUM SERPL-MCNC: 1.7 MG/DL (ref 1.6–2.6)
MCH RBC QN AUTO: 29.7 PG (ref 27–31)
MCHC RBC AUTO-ENTMCNC: 34.5 MG/DL (ref 33–36)
MCV RBC AUTO: 86.2 FL (ref 80–94)
MONOCYTES # BLD AUTO: 0.7 X10(3)/MCL (ref 0.1–1.3)
MONOCYTES NFR BLD AUTO: 8.6 %
NEUTROPHILS # BLD AUTO: 4.9 X10(3)/MCL (ref 2.1–9.2)
NEUTROPHILS NFR BLD AUTO: 60.1 %
NRBC BLD AUTO-RTO: 0 %
PHOSPHATE SERPL-MCNC: 3.6 MG/DL (ref 2.3–4.7)
PLATELET # BLD AUTO: 187 X10(3)/MCL (ref 130–400)
PMV BLD AUTO: 10.4 FL (ref 7.4–10.4)
POTASSIUM SERPL-SCNC: 3.3 MMOL/L (ref 3.5–5.1)
PROT SERPL-MCNC: 5.8 GM/DL (ref 6.4–8.3)
RBC # BLD AUTO: 4.48 X10(6)/MCL (ref 4.7–6.1)
SODIUM SERPL-SCNC: 142 MMOL/L (ref 136–145)
WBC # SPEC AUTO: 8.2 X10(3)/MCL (ref 4.5–11.5)

## 2022-10-23 PROCEDURE — 25000003 PHARM REV CODE 250: Performed by: STUDENT IN AN ORGANIZED HEALTH CARE EDUCATION/TRAINING PROGRAM

## 2022-10-23 PROCEDURE — 84100 ASSAY OF PHOSPHORUS: CPT | Performed by: STUDENT IN AN ORGANIZED HEALTH CARE EDUCATION/TRAINING PROGRAM

## 2022-10-23 PROCEDURE — 85025 COMPLETE CBC W/AUTO DIFF WBC: CPT | Performed by: STUDENT IN AN ORGANIZED HEALTH CARE EDUCATION/TRAINING PROGRAM

## 2022-10-23 PROCEDURE — 80053 COMPREHEN METABOLIC PANEL: CPT | Performed by: STUDENT IN AN ORGANIZED HEALTH CARE EDUCATION/TRAINING PROGRAM

## 2022-10-23 PROCEDURE — 83735 ASSAY OF MAGNESIUM: CPT | Performed by: STUDENT IN AN ORGANIZED HEALTH CARE EDUCATION/TRAINING PROGRAM

## 2022-10-23 PROCEDURE — 63600175 PHARM REV CODE 636 W HCPCS: Performed by: STUDENT IN AN ORGANIZED HEALTH CARE EDUCATION/TRAINING PROGRAM

## 2022-10-23 PROCEDURE — 36415 COLL VENOUS BLD VENIPUNCTURE: CPT | Performed by: STUDENT IN AN ORGANIZED HEALTH CARE EDUCATION/TRAINING PROGRAM

## 2022-10-23 PROCEDURE — 94761 N-INVAS EAR/PLS OXIMETRY MLT: CPT

## 2022-10-23 RX ADMIN — PIPERACILLIN AND TAZOBACTAM 4.5 G: 4; .5 INJECTION, POWDER, LYOPHILIZED, FOR SOLUTION INTRAVENOUS; PARENTERAL at 04:10

## 2022-10-23 NOTE — DISCHARGE SUMMARY
[facility] Surg Telemetry  Discharge Note  Short Stay    Procedure(s) (LRB):  APPENDECTOMY, LAPAROSCOPIC (N/A)      OUTCOME: Patient tolerated treatment/procedure well without complication and is now ready for discharge.    DISPOSITION: Home or Self Care    FINAL DIAGNOSIS:  Acute appendicitis    FOLLOWUP: In clinic    DISCHARGE INSTRUCTIONS:    Discharge Procedure Orders   Diet NPO     Notify your health care provider if you experience any of the following:  temperature >100.4     Notify your health care provider if you experience any of the following:  persistent nausea and vomiting or diarrhea     Notify your health care provider if you experience any of the following:  severe uncontrolled pain     Notify your health care provider if you experience any of the following:  redness, tenderness, or signs of infection (pain, swelling, redness, odor or green/yellow discharge around incision site)     Weight bearing restrictions (specify):   Order Comments: No heavy lifting of more than 10 lbs for 2 weeks.   Ok to shower. Do not submerge in water.        TIME SPENT ON DISCHARGE: 10 minutes

## 2022-10-23 NOTE — PLAN OF CARE
Problem: Bleeding (Surgery Nonspecified)  Goal: Absence of Bleeding  Outcome: Ongoing, Progressing     Problem: Bowel Motility Impaired (Surgery Nonspecified)  Goal: Effective Bowel Elimination  Outcome: Ongoing, Progressing     Problem: Fluid and Electrolyte Imbalance (Surgery Nonspecified)  Goal: Fluid and Electrolyte Balance  Outcome: Ongoing, Progressing     Problem: Infection (Surgery Nonspecified)  Goal: Absence of Infection Signs and Symptoms  Outcome: Ongoing, Progressing     Problem: Pain (Surgery Nonspecified)  Goal: Acceptable Pain Control  Outcome: Ongoing, Progressing     Problem: Postoperative Nausea and Vomiting (Surgery Nonspecified)  Goal: Nausea and Vomiting Relief  Outcome: Ongoing, Progressing

## 2022-10-23 NOTE — MEDICAL/APP STUDENT
GENERAL SURGERY  PROGRESS NOTE    Admit Date: 10/21/2022  Hospital Day: 2  Procedure: 1 Day Post-Op Laparoscopic Appendectomy     24 hour events:  Figueroa Murcia is a 32 M with no significant PMH who presented to the ED with 2 days of N/V, anorexia, and RLQ pain that radiates to his back. He has been seen at an outside facility twice for evaluation but was discharged with zofran before coming to [facility]. CT in ED showed acute appendicitis w/o perforation or periappendiceal fluid.     He was kept overnight due to pain and anorexia. Today he denies N/V, abdominal pain, bloating, diarrhea/constipation. +Flatus. He was able to eat jello and have soda yesterday. States that he is ready to go home.     Physical Exam:  General: No acute distress  Neck: trachea midline  Chest: equal chest rise  Abdomen: ND, NT, incision sites healing well, no erythema or drainage  MSK: Full ROM to all extremities.     Assessment/Plan:  Figueroa Murcia is a 32 M with no significant PMH who presented to the ED with 2 days of N/V, anorexia, and RLQ pain that radiates to his back.     - Pathology has not returned   - Pt stable and pain well controlled  - Discharging pt with pain control regimen  - Follow up in clinic in 2 wks     Libby Arceo, MS3     Inpatient Data      Vitals:   Temp:  [97.9 °F (36.6 °C)-100 °F (37.8 °C)]   Pulse:  [61-79]   Resp:  [14-20]   BP: (105-137)/(63-82)   SpO2:  [96 %-100 %]     Diet NPO  Diet Adult Regular   Intake/Output Summary (Last 24 hours) at 10/23/2022 0623  Last data filed at 10/23/2022 0541  Gross per 24 hour   Intake 3129.17 ml   Output 1520 ml   Net 1609.17 ml          Recent Labs     10/21/22  1712 10/22/22  0441 10/23/22  0504   WBC 12.3* 10.0 8.2   HGB 15.9 13.8* 13.3*   HCT 47.1 40.5* 38.6*    198 187    140  --    K 3.6 3.4*  --    CO2 30* 28  --    BUN 10.3 8.0*  --    CREATININE 0.93 0.92  --    BILITOT 0.8 1.1  --    AST 31 31  --    ALT 32 24  --    ALKPHOS 57 45  --    CALCIUM 9.9 9.1  --     ALBUMIN 3.8 3.1*  --    MG  --  1.60  --    PHOS  --  3.0  --      Scheduled Meds: piperacillin-tazobactam (ZOSYN) IVPB, 4.5 g, Q8H     lactated ringers 50 mL/hr at 10/22/22 1055    lactated ringers

## 2022-10-23 NOTE — ANESTHESIA POSTPROCEDURE EVALUATION
Anesthesia Post Evaluation    Patient: Figueroa Murcia    Procedure(s) Performed: Procedure(s) (LRB):  APPENDECTOMY, LAPAROSCOPIC (N/A)    Final Anesthesia Type: general      Patient location during evaluation: PACU  Patient participation: Yes- Able to Participate  Level of consciousness: awake and responds to stimulation  Post-procedure vital signs: reviewed and stable  Pain management: adequate  Airway patency: patent    PONV status at discharge: No PONV  Anesthetic complications: no      Cardiovascular status: blood pressure returned to baseline  Respiratory status: unassisted  Hydration status: euvolemic  Follow-up not needed.          Vitals Value Taken Time   /64 10/23/22 0724   Temp 36.8 °C (98.3 °F) 10/23/22 0724   Pulse 78 10/23/22 0724   Resp 18 10/23/22 0724   SpO2 98 % 10/23/22 0724         Event Time   Out of Recovery 10/22/2022 09:18:00         Pain/Leslie Score: Pain Rating Prior to Med Admin: 10 (10/22/2022 10:04 PM)  Pain Rating Post Med Admin: 0 (10/22/2022 10:37 PM)  Leslie Score: 10 (10/23/2022  7:49 AM)

## 2022-10-23 NOTE — PROGRESS NOTES
GENERAL SURGERY  PROGRESS NOTE    Admit Date: 10/21/2022  Hospital Day: 2  Procedure: 1 Day Post-Op0    24 hour events:  No acute events overnight  Aferbile, VSS  Minimal abdominal pain  Denies N/V  0 BMs overnight/ + flatulence     Physical Exam:  General: No acute distress  Neck: trachea midline  Chest: equal chest rise  Abdomen: soft, non-distended, minimal abdominal pain, incisision sites are healing well without erythema or purulent discharge   MSK: Full ROM to all extremities.     Assessment/Plan:  Mr. Murcia is a 32 y.o. male w/ no known medical hx who presented with a acute appendicitis s/p laparoscopic appendectomy 10/22.     - Currently on a regular diet  - IV maintenance fluids  - Multi-modal pain control  - Antiemetic meds  - Zosyn  - If patient continues to do well, will discharge later today.     Cady Allan MD  [employer] General Surgery PGY-1     Inpatient Data      Vitals:   Temp:  [97.9 °F (36.6 °C)-100 °F (37.8 °C)]   Pulse:  [61-79]   Resp:  [14-20]   BP: (105-137)/(63-82)   SpO2:  [96 %-100 %]     Diet NPO  Diet Adult Regular   Intake/Output Summary (Last 24 hours) at 10/23/2022 0629  Last data filed at 10/23/2022 0541  Gross per 24 hour   Intake 3129.17 ml   Output 1520 ml   Net 1609.17 ml          Recent Labs     10/21/22  1712 10/22/22  0441 10/23/22  0504   WBC 12.3* 10.0 8.2   HGB 15.9 13.8* 13.3*   HCT 47.1 40.5* 38.6*    198 187    140 142   K 3.6 3.4* 3.3*   CO2 30* 28 29   BUN 10.3 8.0* 8.6*   CREATININE 0.93 0.92 0.88   BILITOT 0.8 1.1 1.3   AST 31 31 50*   ALT 32 24 24   ALKPHOS 57 45 44   CALCIUM 9.9 9.1 8.9   ALBUMIN 3.8 3.1* 3.2*   MG  --  1.60 1.70   PHOS  --  3.0 3.6     Imaging:  - No new imaging within the last 24 hrs.     Pathology:  - Appendix (10/22): pending    Scheduled Meds: piperacillin-tazobactam (ZOSYN) IVPB, 4.5 g, Q8H     lactated ringers 50 mL/hr at 10/22/22 1055    lactated ringers

## 2022-10-23 NOTE — PLAN OF CARE
Problem: Adult Inpatient Plan of Care  Goal: Plan of Care Review  Outcome: Ongoing, Progressing  Goal: Patient-Specific Goal (Individualized)  Outcome: Ongoing, Progressing  Goal: Absence of Hospital-Acquired Illness or Injury  Outcome: Ongoing, Progressing  Goal: Optimal Comfort and Wellbeing  Outcome: Ongoing, Progressing  Goal: Readiness for Transition of Care  Outcome: Ongoing, Progressing     Problem: Nausea and Vomiting  Goal: Fluid and Electrolyte Balance  Outcome: Ongoing, Progressing     Problem: Pain Acute  Goal: Acceptable Pain Control and Functional Ability  Outcome: Ongoing, Progressing     Problem: Bleeding (Surgery Nonspecified)  Goal: Absence of Bleeding  Outcome: Ongoing, Progressing     Problem: Bowel Motility Impaired (Surgery Nonspecified)  Goal: Effective Bowel Elimination  Outcome: Ongoing, Progressing     Problem: Fluid and Electrolyte Imbalance (Surgery Nonspecified)  Goal: Fluid and Electrolyte Balance  Outcome: Ongoing, Progressing     Problem: Infection (Surgery Nonspecified)  Goal: Absence of Infection Signs and Symptoms  Outcome: Ongoing, Progressing     Problem: Pain (Surgery Nonspecified)  Goal: Acceptable Pain Control  Outcome: Ongoing, Progressing     Problem: Postoperative Nausea and Vomiting (Surgery Nonspecified)  Goal: Nausea and Vomiting Relief  Outcome: Ongoing, Progressing

## 2022-10-23 NOTE — DISCHARGE INSTRUCTIONS
[facility] Consult Note - Marylene [place] 69 y o  male MRN: [id number]    Unit/Bed#: 2 South 219 A Encounter: [id number]      Assessment/Plan:  * Status post total knee replacement using cement, right  Assessment & Plan  - received Ancef 2 g IV x 2 for 24 hours post-op  - orthopedic surgery for post-op management  - monitor for any fever   - PT/OT  - DVT ppx ordered  - analgesia per ortho surgery orders  - dressing change as directed per orders  - DCP for tomorrow with PT eval    Hyperlipidemia  Assessment & Plan  Continue lipitor 10 mg q h s  Anxiety  Assessment & Plan  Continue lexapro     Essential hypertension  Assessment & Plan  Stable  Continue home medications and monitor VS  - c/w amlodipine 5 mg bid and labetalol 200 mg b i d    - will hold Cozaar due to the risk of hypotension  Currently normotensive    History of hemorrhagic cerebrovascular accident (CVA) without residual deficits  Assessment & Plan  Subarachnoid hemorrhage in 2003  stable        Subjective:   Patient was seen and examined at bedside  CC: "I feel 105% okay"  Patient denies any pain and states he is doing very well post-op  Denies any chest pain, sob, or abdominal pain  He states he has no medical problems  Objective:     Vitals: Blood pressure 137/80, pulse (!) 53, temperature 97 8 °F (36 6 °C), temperature source Oral, resp  rate 16, height 6' 2" (1 88 m), weight 77 7 kg (171 lb 3 2 oz), SpO2 98 %  ,Body mass index is 21 98 kg/m²    Wt Readings from Last 3 Encounters:   04/05/21 77 7 kg (171 lb 3 2 oz)   03/24/21 80 3 kg (177 lb)   03/23/21 78 5 kg (173 lb)       Intake/Output Summary (Last 24 hours) at 4/5/2021 1627  Last data filed at 4/5/2021 1401  Gross per 24 hour   Intake 950 ml   Output 320 ml   Net 630 ml       Physical Exam:   General: Pt is AAO x 3, not in any acute distress  Cardio: RRR, S1 and S2 +, no murmurs/rubs/gallops/clicks  Resp: CTA b/l, no wheezes/rales/rhonchi  Abdomen: soft, NT/ND, Patient was given discharge instructions. Patient understands discharge .   BS+  Extremities: no cyanosis or edema  PP 2+ b/l  Dressed surgical wound of the r knee      No results found for this or any previous visit (from the past 24 hour(s))      Current Facility-Administered Medications   Medication Dose Route Frequency Provider Last Rate Last Admin    acetaminophen (TYLENOL) tablet 975 mg  975 mg Oral Q8H Leonidas Schultz PA-C   975 mg at 04/05/21 1520    aluminum-magnesium hydroxide-simethicone (MYLANTA) oral suspension 30 mL  30 mL Oral Q6H PRN Jason Willoughby PA-C        amLODIPine (NORVASC) tablet 5 mg  5 mg Oral BID [place] ALENA Willoughby        aspirin tablet 325 mg  325 mg Oral BID Jason Willoughby PA-C        atorvastatin (LIPITOR) tablet 10 mg  10 mg Oral Daily With VerizALENA grigsby        bupivacaine liposomal (EXPAREL) 1 3 % injection 20 mL  20 mL Infiltration Once Jason Willoughby PA-C        ceFAZolin (ANCEF) IVPB (premix in dextrose) 2,000 mg 50 mL  2,000 mg Intravenous Q8H Jason Willoughby PA-C        [START ON 4/6/2021] dexamethasone (DECADRON) injection 10 mg  10 mg Intravenous Once Leonidas Novak PA-C        docusate sodium (COLACE) capsule 100 mg  100 mg Oral BID Jason Willoughby PA-C        escitalopram (LEXAPRO) tablet 5 mg  5 mg Oral HS Jason Willoughby PA-C        gabapentin (NEURONTIN) capsule 200 mg  200 mg Oral BID Jason Willoughby PA-C        HYDROmorphone (DILAUDID) injection 0 5 mg  0 5 mg Intravenous Q2H PRN Jason Willoughby PA-C        labetalol (NORMODYNE) tablet 200 mg  200 mg Oral Q12H [address] Jason Willoughby PA-C        lactated ringers bolus 1,000 mL  1,000 mL Intravenous Once PRN Jason Willoughby PA-C        And    lactated ringers bolus 1,000 mL  1,000 mL Intravenous Once PRN [place] ALENA Willoughby        magnesium hydroxide (MILK OF MAGNESIA) oral suspension 30 mL  30 mL Oral Daily PRN Jason Willoughby PA-C        melatonin tablet 3 mg  3 mg Oral HS Leonidas Novak PA-C        ondansetron [facility] [address] [facility]) injection 4 mg  4 mg Intravenous Q6H PRN Roselie Peace, PA-C        oxyCODONE (ROXICODONE) immediate release tablet 10 mg  10 mg Oral Q4H PRN Roselie Peace, PA-C        oxyCODONE (ROXICODONE) IR tablet 5 mg  5 mg Oral Q4H PRN Roselie Peace, PA-C        pantoprazole (PROTONIX) EC tablet 40 mg  40 mg Oral Daily Roselie Peace, PA-C   40 mg at 04/05/21 1520    scopolamine (TRANSDERM-SCOP) 1 5 mg/3 days TD 72 hr patch 1 patch  1 patch Transdermal Once Roselie Peace, PA-C   1 patch at 04/05/21 0732    sodium chloride 0 9 % bolus 1,000 mL  1,000 mL Intravenous Once PRN Roselie Peace, PA-C        And    sodium chloride 0 9 % bolus 1,000 mL  1,000 mL Intravenous Once PRN Roselie Peace, PA-C        sodium chloride 0 9 % infusion  75 mL/hr Intravenous Continuous Roselie Peace, PA-C 75 mL/hr at 04/05/21 1520 75 mL/hr at 04/05/21 1520       Invasive Devices     Peripheral Intravenous Line            Peripheral IV 04/05/21 Right Wrist less than 1 day          Drain            Urethral Catheter Latex 16 Fr  less than 1 day                Lab, Imaging and other studies: I have personally reviewed pertinent reports      VTE Pharmacologic Prophylaxis: ASA  VTE Mechanical Prophylaxis: sequential compression device    Megan Levin DO

## 2022-10-25 LAB
ESTROGEN SERPL-MCNC: NORMAL PG/ML
INSULIN SERPL-ACNC: NORMAL U[IU]/ML
LAB AP CLINICAL INFORMATION: NORMAL
LAB AP GROSS DESCRIPTION: NORMAL
LAB AP REPORT FOOTNOTES: NORMAL
T3RU NFR SERPL: NORMAL %

## 2022-11-08 ENCOUNTER — OFFICE VISIT (OUTPATIENT)
Dept: SURGERY | Facility: CLINIC | Age: 32
End: 2022-11-08

## 2022-11-08 VITALS
SYSTOLIC BLOOD PRESSURE: 125 MMHG | HEART RATE: 79 BPM | DIASTOLIC BLOOD PRESSURE: 85 MMHG | HEIGHT: 71 IN | RESPIRATION RATE: 18 BRPM | WEIGHT: 253 LBS | OXYGEN SATURATION: 98 % | TEMPERATURE: 98 F | BODY MASS INDEX: 35.42 KG/M2

## 2022-11-08 DIAGNOSIS — Z90.49 S/P APPENDECTOMY: Primary | ICD-10-CM

## 2022-11-08 PROCEDURE — 99213 OFFICE O/P EST LOW 20 MIN: CPT | Mod: PBBFAC

## 2022-11-08 NOTE — PROGRESS NOTES
Pt seen by Dr. Santizo; Pt instructed to return to clinic as needed; Discharge paperwork given w/pt verbalizing understanding

## 2022-11-08 NOTE — PROGRESS NOTES
Surgery Clinic Note    CC: Post op appy    S: doing well  No nausea no vomiting  No fevers  Tolerating diet  Minimal diarrhea related to foods he eats    O:  VS reviewed  NAD  RR  Normal effort  Soft nt nd, incisions c/d/I  No edema    Path:  Final Diagnosis   1. Appendix, appendectomy   - Acute appendicitis.           A:   32M post op from lap appy on 10/22/22    P:  RTC prn

## 2023-03-24 ENCOUNTER — HOSPITAL ENCOUNTER (EMERGENCY)
Facility: HOSPITAL | Age: 33
Discharge: HOME | End: 2023-03-24
Attending: FAMILY MEDICINE

## 2023-03-24 VITALS
HEART RATE: 60 BPM | DIASTOLIC BLOOD PRESSURE: 77 MMHG | SYSTOLIC BLOOD PRESSURE: 147 MMHG | TEMPERATURE: 98 F | WEIGHT: 257.94 LBS | HEIGHT: 72 IN | BODY MASS INDEX: 34.94 KG/M2 | OXYGEN SATURATION: 100 % | RESPIRATION RATE: 20 BRPM

## 2023-03-24 DIAGNOSIS — R10.12 ACUTE LUQ PAIN: Primary | ICD-10-CM

## 2023-03-24 DIAGNOSIS — R11.2 NAUSEA AND VOMITING, UNSPECIFIED VOMITING TYPE: ICD-10-CM

## 2023-03-24 DIAGNOSIS — R53.1 WEAKNESS: ICD-10-CM

## 2023-03-24 LAB
ALBUMIN SERPL-MCNC: 4.2 G/DL (ref 3.5–5)
ALBUMIN/GLOB SERPL: 1.1 RATIO (ref 1.1–2)
ALP SERPL-CCNC: 83 UNIT/L (ref 40–150)
ALT SERPL-CCNC: 32 UNIT/L (ref 0–55)
AMPHET UR QL SCN: NEGATIVE
APPEARANCE UR: CLEAR
AST SERPL-CCNC: 27 UNIT/L (ref 5–34)
BACTERIA #/AREA URNS AUTO: ABNORMAL /HPF
BARBITURATE SCN PRESENT UR: NEGATIVE
BASOPHILS # BLD AUTO: 0.06 X10(3)/MCL (ref 0–0.2)
BASOPHILS NFR BLD AUTO: 0.6 %
BENZODIAZ UR QL SCN: NEGATIVE
BILIRUB UR QL STRIP.AUTO: NEGATIVE MG/DL
BILIRUBIN DIRECT+TOT PNL SERPL-MCNC: 0.8 MG/DL
BUN SERPL-MCNC: 9 MG/DL (ref 8.9–20.6)
C TRACH DNA SPEC QL NAA+PROBE: NOT DETECTED
CALCIUM SERPL-MCNC: 9.8 MG/DL (ref 8.4–10.2)
CANNABINOIDS UR QL SCN: POSITIVE
CHLORIDE SERPL-SCNC: 109 MMOL/L (ref 98–107)
CO2 SERPL-SCNC: 24 MMOL/L (ref 22–29)
COCAINE UR QL SCN: NEGATIVE
COLOR UR AUTO: YELLOW
CREAT SERPL-MCNC: 1.07 MG/DL (ref 0.73–1.18)
EOSINOPHIL # BLD AUTO: 0.25 X10(3)/MCL (ref 0–0.9)
EOSINOPHIL NFR BLD AUTO: 2.4 %
ERYTHROCYTE [DISTWIDTH] IN BLOOD BY AUTOMATED COUNT: 11.9 % (ref 11.5–17)
FENTANYL UR QL SCN: NEGATIVE
GFR SERPLBLD CREATININE-BSD FMLA CKD-EPI: >60 MLS/MIN/1.73/M2
GLOBULIN SER-MCNC: 3.7 GM/DL (ref 2.4–3.5)
GLUCOSE SERPL-MCNC: 107 MG/DL (ref 74–100)
GLUCOSE UR QL STRIP.AUTO: NORMAL MG/DL
HCT VFR BLD AUTO: 50.3 % (ref 42–52)
HGB BLD-MCNC: 16.6 G/DL (ref 14–18)
HYALINE CASTS #/AREA URNS LPF: ABNORMAL /LPF
IMM GRANULOCYTES # BLD AUTO: 0.04 X10(3)/MCL (ref 0–0.04)
IMM GRANULOCYTES NFR BLD AUTO: 0.4 %
KETONES UR QL STRIP.AUTO: NEGATIVE MG/DL
LEUKOCYTE ESTERASE UR QL STRIP.AUTO: NEGATIVE UNIT/L
LIPASE SERPL-CCNC: 25 U/L
LYMPHOCYTES # BLD AUTO: 2.04 X10(3)/MCL (ref 0.6–4.6)
LYMPHOCYTES NFR BLD AUTO: 19.7 %
MCH RBC QN AUTO: 28.4 PG (ref 27–31)
MCHC RBC AUTO-ENTMCNC: 33 G/DL (ref 33–36)
MCV RBC AUTO: 86 FL (ref 80–94)
MDMA UR QL SCN: NEGATIVE
MONOCYTES # BLD AUTO: 0.59 X10(3)/MCL (ref 0.1–1.3)
MONOCYTES NFR BLD AUTO: 5.7 %
MUCOUS THREADS URNS QL MICRO: ABNORMAL /LPF
N GONORRHOEA DNA SPEC QL NAA+PROBE: NOT DETECTED
NEUTROPHILS # BLD AUTO: 7.39 X10(3)/MCL (ref 2.1–9.2)
NEUTROPHILS NFR BLD AUTO: 71.2 %
NITRITE UR QL STRIP.AUTO: NEGATIVE
NRBC BLD AUTO-RTO: 0 %
OPIATES UR QL SCN: NEGATIVE
PCP UR QL: NEGATIVE
PH UR STRIP.AUTO: 5.5 [PH]
PH UR: 5.5 [PH] (ref 3–11)
PLATELET # BLD AUTO: 214 X10(3)/MCL (ref 130–400)
PMV BLD AUTO: 10 FL (ref 7.4–10.4)
POTASSIUM SERPL-SCNC: 4 MMOL/L (ref 3.5–5.1)
PROT SERPL-MCNC: 7.9 GM/DL (ref 6.4–8.3)
PROT UR QL STRIP.AUTO: ABNORMAL MG/DL
RBC # BLD AUTO: 5.85 X10(6)/MCL (ref 4.7–6.1)
RBC #/AREA URNS AUTO: ABNORMAL /HPF
RBC UR QL AUTO: ABNORMAL UNIT/L
SODIUM SERPL-SCNC: 142 MMOL/L (ref 136–145)
SP GR UR STRIP.AUTO: 1.03
SPERM URNS QL MICRO: ABNORMAL /HPF
SQUAMOUS #/AREA URNS LPF: ABNORMAL /HPF
UROBILINOGEN UR STRIP-ACNC: NORMAL MG/DL
WBC # SPEC AUTO: 10.4 X10(3)/MCL (ref 4.5–11.5)
WBC #/AREA URNS AUTO: ABNORMAL /HPF

## 2023-03-24 PROCEDURE — 96361 HYDRATE IV INFUSION ADD-ON: CPT

## 2023-03-24 PROCEDURE — 85025 COMPLETE CBC W/AUTO DIFF WBC: CPT | Performed by: PHYSICIAN ASSISTANT

## 2023-03-24 PROCEDURE — 99285 EMERGENCY DEPT VISIT HI MDM: CPT | Mod: 25

## 2023-03-24 PROCEDURE — 63600175 PHARM REV CODE 636 W HCPCS: Performed by: PHYSICIAN ASSISTANT

## 2023-03-24 PROCEDURE — 83690 ASSAY OF LIPASE: CPT | Performed by: PHYSICIAN ASSISTANT

## 2023-03-24 PROCEDURE — 96374 THER/PROPH/DIAG INJ IV PUSH: CPT

## 2023-03-24 PROCEDURE — 93005 ELECTROCARDIOGRAM TRACING: CPT

## 2023-03-24 PROCEDURE — 25500020 PHARM REV CODE 255: Performed by: FAMILY MEDICINE

## 2023-03-24 PROCEDURE — 80307 DRUG TEST PRSMV CHEM ANLYZR: CPT | Performed by: PHYSICIAN ASSISTANT

## 2023-03-24 PROCEDURE — 87591 N.GONORRHOEAE DNA AMP PROB: CPT | Performed by: PHYSICIAN ASSISTANT

## 2023-03-24 PROCEDURE — 81001 URINALYSIS AUTO W/SCOPE: CPT | Performed by: PHYSICIAN ASSISTANT

## 2023-03-24 PROCEDURE — 96372 THER/PROPH/DIAG INJ SC/IM: CPT | Performed by: PHYSICIAN ASSISTANT

## 2023-03-24 PROCEDURE — 96375 TX/PRO/DX INJ NEW DRUG ADDON: CPT

## 2023-03-24 PROCEDURE — 80053 COMPREHEN METABOLIC PANEL: CPT | Performed by: PHYSICIAN ASSISTANT

## 2023-03-24 RX ORDER — ONDANSETRON 2 MG/ML
4 INJECTION INTRAMUSCULAR; INTRAVENOUS
Status: COMPLETED | OUTPATIENT
Start: 2023-03-24 | End: 2023-03-24

## 2023-03-24 RX ORDER — PROMETHAZINE HYDROCHLORIDE 25 MG/1
25 TABLET ORAL EVERY 6 HOURS PRN
Qty: 12 TABLET | Refills: 0 | Status: SHIPPED | OUTPATIENT
Start: 2023-03-24 | End: 2023-04-05

## 2023-03-24 RX ORDER — KETOROLAC TROMETHAMINE 30 MG/ML
15 INJECTION, SOLUTION INTRAMUSCULAR; INTRAVENOUS
Status: COMPLETED | OUTPATIENT
Start: 2023-03-24 | End: 2023-03-24

## 2023-03-24 RX ORDER — MORPHINE SULFATE 2 MG/ML
6 INJECTION, SOLUTION INTRAMUSCULAR; INTRAVENOUS ONCE
Status: COMPLETED | OUTPATIENT
Start: 2023-03-24 | End: 2023-03-24

## 2023-03-24 RX ORDER — DICYCLOMINE HYDROCHLORIDE 20 MG/1
20 TABLET ORAL
Qty: 16 TABLET | Refills: 0 | Status: SHIPPED | OUTPATIENT
Start: 2023-03-24 | End: 2023-03-28

## 2023-03-24 RX ORDER — PROMETHAZINE HYDROCHLORIDE 25 MG/ML
25 INJECTION, SOLUTION INTRAMUSCULAR; INTRAVENOUS ONCE
Status: COMPLETED | OUTPATIENT
Start: 2023-03-24 | End: 2023-03-24

## 2023-03-24 RX ADMIN — SODIUM CHLORIDE, POTASSIUM CHLORIDE, SODIUM LACTATE AND CALCIUM CHLORIDE 1000 ML: 600; 310; 30; 20 INJECTION, SOLUTION INTRAVENOUS at 09:03

## 2023-03-24 RX ADMIN — ONDANSETRON 4 MG: 2 INJECTION INTRAMUSCULAR; INTRAVENOUS at 09:03

## 2023-03-24 RX ADMIN — KETOROLAC TROMETHAMINE 15 MG: 30 INJECTION, SOLUTION INTRAMUSCULAR at 09:03

## 2023-03-24 RX ADMIN — IOHEXOL 100 ML: 350 INJECTION, SOLUTION INTRAVENOUS at 10:03

## 2023-03-24 RX ADMIN — MORPHINE SULFATE 6 MG: 2 INJECTION, SOLUTION INTRAMUSCULAR; INTRAVENOUS at 10:03

## 2023-03-24 RX ADMIN — PROMETHAZINE HYDROCHLORIDE 25 MG: 25 INJECTION INTRAMUSCULAR; INTRAVENOUS at 10:03

## 2023-03-24 NOTE — ED PROVIDER NOTES
Encounter Date: 3/24/2023       History     Chief Complaint   Patient presents with    Flank Pain     PT W LT FLANK PAIN W NV AND CHILLS AND SWEATS SINCE THIS AM.  NO DYSURIA.      32-year-old male presents emergency department with complaints of left flank, left upper quadrant pain, nausea, vomiting chills and sweats that began this morning.  He states he ate [place] [employer] and [employer] last night and then his symptoms began this morning upon waking.  He states his pain is constant and rates it 9/10.  He had 3 normal bowel movements this morning.  He denies diarrhea, shortness of breath, chest pain, dysuria, hematuria, dizziness.      The history is provided by the patient. No  was used.   Review of patient's allergies indicates:  No Known Allergies  Past Medical History:   Diagnosis Date    At risk for alteration in comfort     Obesity, unspecified     Tobacco abuse      Past Surgical History:   Procedure Laterality Date    arm repair Right     LAPAROSCOPIC APPENDECTOMY N/A 10/22/2022    Procedure: APPENDECTOMY, LAPAROSCOPIC;  Surgeon: Vinay Sims Jr., MD;  Location: [facility];  Service: General;  Laterality: N/A;     Family History   Problem Relation Age of Onset    Hypertension Mother     No Known Problems Father      Social History     Tobacco Use    Smoking status: Every Day     Packs/day: 0.50     Types: Cigarettes    Smokeless tobacco: Never   Substance Use Topics    Alcohol use: Yes     Comment: occasionally    Drug use: Yes     Frequency: 7.0 times per week     Types: Marijuana     Review of Systems   Constitutional:  Positive for chills. Negative for fever.   Respiratory:  Negative for cough and shortness of breath.    Cardiovascular:  Negative for chest pain and palpitations.   Gastrointestinal:  Positive for abdominal pain (Left), nausea and vomiting. Negative for constipation and diarrhea.   Genitourinary:  Positive for flank pain (Left). Negative for dysuria and hematuria.    Musculoskeletal:  Negative for back pain and neck pain.   Skin:  Negative for rash.   Neurological:  Negative for dizziness, light-headedness and headaches.     Physical Exam     Initial Vitals [03/24/23 0852]   BP Pulse Resp Temp SpO2   131/75 60 16 97.9 °F (36.6 °C) 100 %      MAP       --         Physical Exam    Nursing note and vitals reviewed.  Constitutional: He appears well-developed and well-nourished.   Shivering   HENT:   Nose: Nose normal.   Mouth/Throat: Oropharynx is clear and moist.   Eyes: Conjunctivae are normal.   Neck: Neck supple.   Normal range of motion.  Cardiovascular:  Normal rate, normal heart sounds and intact distal pulses.           Pulmonary/Chest: Breath sounds normal.   Abdominal: Abdomen is soft. Bowel sounds are normal. There is abdominal tenderness (Left flank, left upper quadrant). There is no rebound and no guarding.   Musculoskeletal:         General: Normal range of motion.      Cervical back: Normal range of motion and neck supple.     Neurological: He is alert. GCS eye subscore is 4. GCS verbal subscore is 5. GCS motor subscore is 6.   Skin: Skin is warm. Capillary refill takes less than 2 seconds.       ED Course   Procedures  Labs Reviewed   COMPREHENSIVE METABOLIC PANEL - Abnormal; Notable for the following components:       Result Value    Chloride 109 (*)     Glucose Level 107 (*)     Globulin 3.7 (*)     All other components within normal limits   URINALYSIS, REFLEX TO URINE CULTURE - Abnormal; Notable for the following components:    Protein, UA 1+ (*)     Blood, UA Trace (*)     Bacteria, UA Trace (*)     Squamous Epithelial Cells, UA Trace (*)     Mucous, UA Trace (*)     Sperm, UA Trace (*)     All other components within normal limits   DRUG SCREEN, URINE (BEAKER) - Abnormal; Notable for the following components:    Cannabinoids, Urine Positive (*)     All other components within normal limits    Narrative:     Cut off concentrations:    Amphetamines - 1000  ng/ml  Barbiturates - 200 ng/ml  Benzodiazepine - 200 ng/ml  Cannabinoids (THC) - 50 ng/ml  Cocaine - 300 ng/ml  Fentanyl - 1.0 ng/ml  MDMA - 500 ng/ml  Opiates - 300 ng/ml   Phencyclidine (PCP) - 25 ng/ml    Specimen submitted for drug analysis and tested for pH and specific gravity in order to evaluate sample integrity. Suspect tampering if specific gravity is <1.003 and/or pH is not within the range of 4.5 - 8.0  False negatives may result form substances such as bleach added to urine.  False positives may result for the presence of a substance with similar chemical structure to the drug or its metabolite.    This test provides only a PRELIMINARY analytical test result. A more specific alternate chemical method must be used in order to obtain a confirmed analytical result. Gas chromatography/mass spectrometry (GC/MS) is the preferred confirmatory method. Other chemical confirmation methods are available. Clinical consideration and professional judgement should be applied to any drug of abuse test result, particularly when preliminary positive results are used.    Positive results will be confirmed only at the physicians request. Unconfirmed screening results are to be used only for medical purposes (treatment).        CHLAMYDIA/GONORRHOEAE(GC), PCR - Normal    Narrative:     The Xpert CT/NG test, performed on the GeneXpert system is a qualitative in vitro real-time polymerase chain reaction (PCR) test for the automated detected and differentiation for genomic DNA from Chlamydia trachomatis (CT) and/or Neisseria gonorrhoeae (NG).   LIPASE - Normal   CBC W/ AUTO DIFFERENTIAL    Narrative:     The following orders were created for panel order CBC Auto Differential.  Procedure                               Abnormality         Status                     ---------                               -----------         ------                     CBC with Differential[[id number]]                            Final result                  Please view results for these tests on the individual orders.   CBC WITH DIFFERENTIAL   EXTRA TUBES    Narrative:     The following orders were created for panel order EXTRA TUBES.  Procedure                               Abnormality         Status                     ---------                               -----------         ------                     Light Blue Top Hold[[id number]]                              In process                 Gold Top Hold[[id number]]                                    In process                 Pink Top Hold[[id number]]                                    In process                   Please view results for these tests on the individual orders.   LIGHT BLUE TOP HOLD   GOLD TOP HOLD   PINK TOP HOLD     EKG Readings: (Independently Interpreted)   Initial Reading: No STEMI. Rhythm: Sinus Bradycardia. Heart Rate: 57. Axis: Normal.   ECG Results              EKG 12-lead (In process)  Result time 03/24/23 11:34:09      In process by Interface, Lab In [facility] (03/24/23 11:34:09)                   Narrative:    Test Reason : R53.1,    Vent. Rate : 057 BPM     Atrial Rate : 057 BPM     P-R Int : 152 ms          QRS Dur : 072 ms      QT Int : 420 ms       P-R-T Axes : 060 072 031 degrees     QTc Int : 408 ms    Sinus bradycardia  Otherwise normal ECG  No previous ECGs available    Referred By: [email or web address]   SELF           Confirmed By:                                   Imaging Results              CT Abdomen Pelvis With Contrast (Final result)  Result time 03/24/23 11:08:18      Final result by Latrice Odonnell MD (03/24/23 11:08:18)                   Impression:      No abnormality seen      Electronically signed by: Latrice Odonnell  Date:    03/24/2023  Time:    11:08               Narrative:    EXAMINATION:  CT ABDOMEN PELVIS WITH CONTRAST    CLINICAL HISTORY:  LUQ/flank pain, nausea, vomiting;    TECHNIQUE:  Low dose axial images, sagittal and coronal reformations were obtained  from the lung bases to the pubic symphysis following the IV administration of contrast. Automatic exposure control (AEC) is utilized to reduce patient radiation exposure.    COMPARISON:  10/21/2022    FINDINGS:  The lung bases are clear.    The liver appears normal.  No liver mass or lesion is seen.  Portal and hepatic veins appear normal.    The gallbladder appears normal.  No obvious gallstones are seen.  No biliary dilatation is seen.  No pericholecystic fluid is seen.    The pancreas appears normal.  No pancreatic mass or lesion is seen.    The spleen shows no acute abnormality.    The adrenal glands appear normal.  No adrenal nodule is seen.    The kidneys appear normal.  No hydronephrosis is seen.  No hydroureter is seen.  No nephrolithiasis is seen.  No obvious ureteral stones are seen.    Urinary bladder appears grossly unremarkable.    No colitis is seen.  No diverticulitis is seen.  No obvious colonic mass or lesion is seen.  The patient is a status post appendectomy    No free air is seen.  No free fluid is seen.                                       Medications   lactated ringers bolus 1,000 mL (0 mLs Intravenous Stopped 3/24/23 1041)   ondansetron injection 4 mg (4 mg Intravenous Given 3/24/23 0930)   ketorolac injection 15 mg (15 mg Intravenous Given 3/24/23 0929)   morphine injection 6 mg (6 mg Intravenous Given 3/24/23 1013)   promethazine injection 25 mg (25 mg Intramuscular Given 3/24/23 1013)   iohexoL (OMNIPAQUE 350) injection 100 mL (100 mLs Intravenous Given 3/24/23 1051)     Medical Decision Making:   Initial Assessment:   32-year-old male presents emergency department with complaints of left flank, left upper quadrant pain, nausea, vomiting chills and sweats that began this morning.  Differential Diagnosis:   Gastroenteritis, peptic ulcer disease, pancreatitis, cholelithiasis, cholecystitis, GERD, hiatal hernia, food poisoning  Clinical Tests:   Lab Tests: Reviewed and Ordered       <>  Summary of Lab: Blood work  unremarkable    UDS:  Positive for cannabinoids  Radiological Study: Ordered and Reviewed  Medical Tests: Ordered and Reviewed  ED Management:  Medication given:  Lactated Ringer's, Zofran 4 mg IV, Toradol 15 mg IV, morphine 6 mg IV, promethazine 25 mg IM    CT abdomen and pelvis with contrast:  No abnormality seen    Patient no longer shivering, in no pain and has not vomited since Phenergan and morphine given.    Prescriptions:  Bentyl and Phenergan    Possibly viral vs food poisoning.    He will return if symptoms do not improve or worsen    The patient is resting comfortably and in no acute distress.  He states that his symptoms have improved after treatment in Emergency Department. I personally discussed his test results and treatment plan.  Gave strict ED precautions and specific conditions for return to the emergency department and importance of follow up with pcp.  Patient voices understanding and agrees to the plan discussed. All patients' questions have been answered at this time. He has remained hemodynamically stable throughout entire stay in ED and is stable for discharge home.            ED Course as of 03/24/23 1207   Fri Mar 24, 2023   1112 CT Abdomen Pelvis With Contrast  No abnormality seen [ER]   1137 EKG:  Ischemia, sinus bradycardia [ER]   1204 Cannabinoids, Urine(!): Positive [ER]      ED Course User Index  [ER] CRISTINO Murdock                 Clinical Impression:   Final diagnoses:  [R53.1] Weakness  [R10.12] Acute LUQ pain (Primary)  [R11.2] Nausea and vomiting, unspecified vomiting type        ED Disposition Condition    Discharge Stable            ED Prescriptions       Medication Sig Dispense Start Date End Date Auth. Provider    dicyclomine (BENTYL) 20 mg tablet Take 1 tablet (20 mg total) by mouth 4 (four) times daily before meals and nightly. for 16 doses 16 tablet 3/24/2023 3/28/2023 CRISTINO Murdock    promethazine (PHENERGAN) 25 MG tablet Take 1 tablet  (25 mg total) by mouth every 6 (six) hours as needed for Nausea. 12 tablet 3/24/2023 4/5/2023 CRISTINO Murdock          Follow-up Information       Follow up With Specialties Details Why Contact Info    [facility] - Emergency Dept Emergency Medicine  As needed, If symptoms worsen [address] [facility] [id number] [phone]               CRISTINO Murdock  03/24/23 4113

## 2023-03-24 NOTE — DISCHARGE INSTRUCTIONS
Take Bentyl as needed for abdominal discomfort, Phenergan as needed for nausea and vomiting.  Eat a bland diet until symptoms improve.  Stay hydrated drinking lots of water.  Follow up with the primary care provider within 2-3 days.

## 2023-03-24 NOTE — Clinical Note
"Figueroa Downeyy" Twin was seen and treated in our emergency department on 3/24/2023.  He may return to work on 03/25/2023.       If you have any questions or concerns, please don't hesitate to call.       RN    "

## 2023-03-26 ENCOUNTER — HOSPITAL ENCOUNTER (EMERGENCY)
Facility: HOSPITAL | Age: 33
Discharge: HOME | End: 2023-03-27
Attending: FAMILY MEDICINE

## 2023-03-26 ENCOUNTER — HOSPITAL ENCOUNTER (EMERGENCY)
Facility: HOSPITAL | Age: 33
Discharge: LEFT BEFORE DISCHARGE | End: 2023-03-26

## 2023-03-26 VITALS
WEIGHT: 260.56 LBS | TEMPERATURE: 99 F | HEART RATE: 80 BPM | BODY MASS INDEX: 36.48 KG/M2 | SYSTOLIC BLOOD PRESSURE: 140 MMHG | RESPIRATION RATE: 20 BRPM | HEIGHT: 71 IN | DIASTOLIC BLOOD PRESSURE: 76 MMHG | OXYGEN SATURATION: 98 %

## 2023-03-26 VITALS
BODY MASS INDEX: 33.86 KG/M2 | OXYGEN SATURATION: 98 % | WEIGHT: 250 LBS | RESPIRATION RATE: 20 BRPM | SYSTOLIC BLOOD PRESSURE: 151 MMHG | TEMPERATURE: 99 F | DIASTOLIC BLOOD PRESSURE: 87 MMHG | HEART RATE: 57 BPM | HEIGHT: 72 IN

## 2023-03-26 DIAGNOSIS — F12.90 CANNABINOID HYPEREMESIS SYNDROME: Primary | ICD-10-CM

## 2023-03-26 DIAGNOSIS — R11.2 CANNABINOID HYPEREMESIS SYNDROME: Primary | ICD-10-CM

## 2023-03-26 LAB
ALBUMIN SERPL-MCNC: 4 G/DL (ref 3.5–5)
ALBUMIN SERPL-MCNC: 4.1 G/DL (ref 3.5–5)
ALBUMIN/GLOB SERPL: 1.2 RATIO (ref 1.1–2)
ALBUMIN/GLOB SERPL: 1.2 RATIO (ref 1.1–2)
ALP SERPL-CCNC: 63 UNIT/L (ref 40–150)
ALP SERPL-CCNC: 70 UNIT/L (ref 40–150)
ALT SERPL-CCNC: 26 UNIT/L (ref 0–55)
ALT SERPL-CCNC: 28 UNIT/L (ref 0–55)
AMPHET UR QL SCN: NEGATIVE
APPEARANCE UR: CLEAR
APPEARANCE UR: CLEAR
AST SERPL-CCNC: 25 UNIT/L (ref 5–34)
AST SERPL-CCNC: 26 UNIT/L (ref 5–34)
BACTERIA #/AREA URNS AUTO: ABNORMAL /HPF
BACTERIA #/AREA URNS AUTO: NORMAL /HPF
BARBITURATE SCN PRESENT UR: NEGATIVE
BASOPHILS # BLD AUTO: 0.01 X10(3)/MCL (ref 0–0.2)
BASOPHILS # BLD AUTO: 0.04 X10(3)/MCL (ref 0–0.2)
BASOPHILS NFR BLD AUTO: 0.1 %
BASOPHILS NFR BLD AUTO: 0.4 %
BENZODIAZ UR QL SCN: NEGATIVE
BILIRUB UR QL STRIP.AUTO: NEGATIVE MG/DL
BILIRUB UR QL STRIP.AUTO: NEGATIVE MG/DL
BILIRUBIN DIRECT+TOT PNL SERPL-MCNC: 0.7 MG/DL
BILIRUBIN DIRECT+TOT PNL SERPL-MCNC: 0.9 MG/DL
BUN SERPL-MCNC: 7.9 MG/DL (ref 8.9–20.6)
BUN SERPL-MCNC: 8.9 MG/DL (ref 8.9–20.6)
CALCIUM SERPL-MCNC: 9.5 MG/DL (ref 8.4–10.2)
CALCIUM SERPL-MCNC: 9.6 MG/DL (ref 8.4–10.2)
CANNABINOIDS UR QL SCN: POSITIVE
CHLORIDE SERPL-SCNC: 105 MMOL/L (ref 98–107)
CHLORIDE SERPL-SCNC: 105 MMOL/L (ref 98–107)
CO2 SERPL-SCNC: 25 MMOL/L (ref 22–29)
CO2 SERPL-SCNC: 29 MMOL/L (ref 22–29)
COCAINE UR QL SCN: NEGATIVE
COLOR UR AUTO: YELLOW
COLOR UR AUTO: YELLOW
CREAT SERPL-MCNC: 0.89 MG/DL (ref 0.73–1.18)
CREAT SERPL-MCNC: 1 MG/DL (ref 0.73–1.18)
EOSINOPHIL # BLD AUTO: 0.01 X10(3)/MCL (ref 0–0.9)
EOSINOPHIL # BLD AUTO: 0.03 X10(3)/MCL (ref 0–0.9)
EOSINOPHIL NFR BLD AUTO: 0.1 %
EOSINOPHIL NFR BLD AUTO: 0.3 %
ERYTHROCYTE [DISTWIDTH] IN BLOOD BY AUTOMATED COUNT: 12.3 % (ref 11.5–17)
ERYTHROCYTE [DISTWIDTH] IN BLOOD BY AUTOMATED COUNT: 12.4 % (ref 11.5–17)
FENTANYL UR QL SCN: NEGATIVE
GFR SERPLBLD CREATININE-BSD FMLA CKD-EPI: >60 MLS/MIN/1.73/M2
GFR SERPLBLD CREATININE-BSD FMLA CKD-EPI: >60 MLS/MIN/1.73/M2
GLOBULIN SER-MCNC: 3.4 GM/DL (ref 2.4–3.5)
GLOBULIN SER-MCNC: 3.5 GM/DL (ref 2.4–3.5)
GLUCOSE SERPL-MCNC: 118 MG/DL (ref 74–100)
GLUCOSE SERPL-MCNC: 131 MG/DL (ref 74–100)
GLUCOSE UR QL STRIP.AUTO: NEGATIVE MG/DL
GLUCOSE UR QL STRIP.AUTO: NORMAL MG/DL
HCT VFR BLD AUTO: 42.8 % (ref 42–52)
HCT VFR BLD AUTO: 46.9 % (ref 42–52)
HGB BLD-MCNC: 14.7 G/DL (ref 14–18)
HGB BLD-MCNC: 15.4 G/DL (ref 14–18)
HYALINE CASTS #/AREA URNS LPF: ABNORMAL /LPF
IMM GRANULOCYTES # BLD AUTO: 0.03 X10(3)/MCL (ref 0–0.04)
IMM GRANULOCYTES # BLD AUTO: 0.04 X10(3)/MCL (ref 0–0.04)
IMM GRANULOCYTES NFR BLD AUTO: 0.3 %
IMM GRANULOCYTES NFR BLD AUTO: 0.4 %
KETONES UR QL STRIP.AUTO: ABNORMAL MG/DL
KETONES UR QL STRIP.AUTO: NEGATIVE MG/DL
LEUKOCYTE ESTERASE UR QL STRIP.AUTO: NEGATIVE UNIT/L
LEUKOCYTE ESTERASE UR QL STRIP.AUTO: NEGATIVE UNIT/L
LYMPHOCYTES # BLD AUTO: 0.7 X10(3)/MCL (ref 0.6–4.6)
LYMPHOCYTES # BLD AUTO: 0.93 X10(3)/MCL (ref 0.6–4.6)
LYMPHOCYTES NFR BLD AUTO: 7.5 %
LYMPHOCYTES NFR BLD AUTO: 9.8 %
MCH RBC QN AUTO: 28.6 PG (ref 27–31)
MCH RBC QN AUTO: 29.3 PG (ref 27–31)
MCHC RBC AUTO-ENTMCNC: 32.8 G/DL (ref 33–36)
MCHC RBC AUTO-ENTMCNC: 34.3 G/DL (ref 33–36)
MCV RBC AUTO: 85.3 FL (ref 80–94)
MCV RBC AUTO: 87 FL (ref 80–94)
MDMA UR QL SCN: NEGATIVE
MONOCYTES # BLD AUTO: 0.44 X10(3)/MCL (ref 0.1–1.3)
MONOCYTES # BLD AUTO: 0.5 X10(3)/MCL (ref 0.1–1.3)
MONOCYTES NFR BLD AUTO: 4.7 %
MONOCYTES NFR BLD AUTO: 5.3 %
MUCOUS THREADS URNS QL MICRO: ABNORMAL /LPF
NEUTROPHILS # BLD AUTO: 7.91 X10(3)/MCL (ref 2.1–9.2)
NEUTROPHILS # BLD AUTO: 8.18 X10(3)/MCL (ref 2.1–9.2)
NEUTROPHILS NFR BLD AUTO: 83.8 %
NEUTROPHILS NFR BLD AUTO: 87.3 %
NITRITE UR QL STRIP.AUTO: NEGATIVE
NITRITE UR QL STRIP.AUTO: NEGATIVE
NRBC BLD AUTO-RTO: 0 %
NRBC BLD AUTO-RTO: 0 %
OPIATES UR QL SCN: NEGATIVE
PCP UR QL: NEGATIVE
PH UR STRIP.AUTO: 6 [PH]
PH UR STRIP.AUTO: 6 [PH]
PH UR: 6 [PH] (ref 3–11)
PLATELET # BLD AUTO: 207 X10(3)/MCL (ref 130–400)
PLATELET # BLD AUTO: 212 X10(3)/MCL (ref 130–400)
PMV BLD AUTO: 10.4 FL (ref 7.4–10.4)
PMV BLD AUTO: 9.8 FL (ref 7.4–10.4)
POTASSIUM SERPL-SCNC: 3.8 MMOL/L (ref 3.5–5.1)
POTASSIUM SERPL-SCNC: 3.9 MMOL/L (ref 3.5–5.1)
PROT SERPL-MCNC: 7.4 GM/DL (ref 6.4–8.3)
PROT SERPL-MCNC: 7.6 GM/DL (ref 6.4–8.3)
PROT UR QL STRIP.AUTO: ABNORMAL MG/DL
PROT UR QL STRIP.AUTO: ABNORMAL MG/DL
RBC # BLD AUTO: 5.02 X10(6)/MCL (ref 4.7–6.1)
RBC # BLD AUTO: 5.39 X10(6)/MCL (ref 4.7–6.1)
RBC #/AREA URNS AUTO: <5 /HPF
RBC #/AREA URNS AUTO: ABNORMAL /HPF
RBC UR QL AUTO: NEGATIVE UNIT/L
RBC UR QL AUTO: NEGATIVE UNIT/L
SODIUM SERPL-SCNC: 140 MMOL/L (ref 136–145)
SODIUM SERPL-SCNC: 141 MMOL/L (ref 136–145)
SP GR UR STRIP.AUTO: 1.03 (ref 1–1.03)
SP GR UR STRIP.AUTO: 1.04
SQUAMOUS #/AREA URNS AUTO: <5 /HPF
SQUAMOUS #/AREA URNS LPF: ABNORMAL /HPF
UROBILINOGEN UR STRIP-ACNC: 1 MG/DL
UROBILINOGEN UR STRIP-ACNC: NORMAL MG/DL
WBC # SPEC AUTO: 9.4 X10(3)/MCL (ref 4.5–11.5)
WBC # SPEC AUTO: 9.5 X10(3)/MCL (ref 4.5–11.5)
WBC #/AREA URNS AUTO: <5 /HPF
WBC #/AREA URNS AUTO: ABNORMAL /HPF

## 2023-03-26 PROCEDURE — 85025 COMPLETE CBC W/AUTO DIFF WBC: CPT | Performed by: NURSE PRACTITIONER

## 2023-03-26 PROCEDURE — 81001 URINALYSIS AUTO W/SCOPE: CPT | Performed by: NURSE PRACTITIONER

## 2023-03-26 PROCEDURE — 99281 EMR DPT VST MAYX REQ PHY/QHP: CPT | Mod: 25,27

## 2023-03-26 PROCEDURE — 96361 HYDRATE IV INFUSION ADD-ON: CPT

## 2023-03-26 PROCEDURE — 80307 DRUG TEST PRSMV CHEM ANLYZR: CPT | Performed by: NURSE PRACTITIONER

## 2023-03-26 PROCEDURE — 96372 THER/PROPH/DIAG INJ SC/IM: CPT | Mod: 59 | Performed by: NURSE PRACTITIONER

## 2023-03-26 PROCEDURE — 25000003 PHARM REV CODE 250: Performed by: NURSE PRACTITIONER

## 2023-03-26 PROCEDURE — 96374 THER/PROPH/DIAG INJ IV PUSH: CPT

## 2023-03-26 PROCEDURE — 80053 COMPREHEN METABOLIC PANEL: CPT | Performed by: NURSE PRACTITIONER

## 2023-03-26 PROCEDURE — 99284 EMERGENCY DEPT VISIT MOD MDM: CPT | Mod: 25

## 2023-03-26 PROCEDURE — 96375 TX/PRO/DX INJ NEW DRUG ADDON: CPT

## 2023-03-26 PROCEDURE — 63600175 PHARM REV CODE 636 W HCPCS: Performed by: NURSE PRACTITIONER

## 2023-03-26 RX ORDER — CAPSAICIN 0.03 G/100G
CREAM TOPICAL 3 TIMES DAILY PRN
Qty: 60 G | Refills: 0 | Status: SHIPPED | OUTPATIENT
Start: 2023-03-26 | End: 2023-05-26 | Stop reason: ALTCHOICE

## 2023-03-26 RX ORDER — DIPHENHYDRAMINE HYDROCHLORIDE 50 MG/ML
50 INJECTION INTRAMUSCULAR; INTRAVENOUS
Status: COMPLETED | OUTPATIENT
Start: 2023-03-26 | End: 2023-03-26

## 2023-03-26 RX ORDER — HALOPERIDOL 5 MG/ML
2.5 INJECTION INTRAMUSCULAR
Status: COMPLETED | OUTPATIENT
Start: 2023-03-26 | End: 2023-03-26

## 2023-03-26 RX ORDER — ONDANSETRON 2 MG/ML
4 INJECTION INTRAMUSCULAR; INTRAVENOUS
Status: COMPLETED | OUTPATIENT
Start: 2023-03-26 | End: 2023-03-26

## 2023-03-26 RX ORDER — KETOROLAC TROMETHAMINE 30 MG/ML
15 INJECTION, SOLUTION INTRAMUSCULAR; INTRAVENOUS
Status: COMPLETED | OUTPATIENT
Start: 2023-03-26 | End: 2023-03-26

## 2023-03-26 RX ORDER — SODIUM CHLORIDE 9 MG/ML
1000 INJECTION, SOLUTION INTRAVENOUS
Status: COMPLETED | OUTPATIENT
Start: 2023-03-26 | End: 2023-03-26

## 2023-03-26 RX ADMIN — SODIUM CHLORIDE 1000 ML: 9 INJECTION, SOLUTION INTRAVENOUS at 01:03

## 2023-03-26 RX ADMIN — HALOPERIDOL LACTATE 2.5 MG: 5 INJECTION, SOLUTION INTRAMUSCULAR at 11:03

## 2023-03-26 RX ADMIN — KETOROLAC TROMETHAMINE 15 MG: 30 INJECTION, SOLUTION INTRAMUSCULAR at 09:03

## 2023-03-26 RX ADMIN — DIPHENHYDRAMINE HYDROCHLORIDE 50 MG: 50 INJECTION INTRAMUSCULAR; INTRAVENOUS at 09:03

## 2023-03-26 RX ADMIN — SODIUM CHLORIDE 1000 ML: 9 INJECTION, SOLUTION INTRAVENOUS at 09:03

## 2023-03-26 RX ADMIN — ONDANSETRON 4 MG: 2 INJECTION INTRAMUSCULAR; INTRAVENOUS at 10:03

## 2023-03-26 NOTE — Clinical Note
camille cordova accompanied their mother to the emergency department on 3/26/2023. They may return to work on 03/28/2023.      If you have any questions or concerns, please don't hesitate to call.      karen gusman rn RN

## 2023-03-26 NOTE — FIRST PROVIDER EVALUATION
Medical screening examination initiated.  I have conducted a focused provider triage encounter, findings are as follows:    Brief history of present illness:  31y/o M presents to the ED with left flank pain. Zofran given in route for nausea.     Vitals:    03/26/23 1256   BP: (!) 151/87   Pulse: (!) 57   Resp: 20   Temp: 98.6 °F (37 °C)   TempSrc: Oral   SpO2: 98%   Weight: 113.4 kg (250 lb)   Height: 6' (1.829 m)       Pertinent physical exam:  AAA x 3    Brief workup plan:  Labs    Preliminary workup initiated; this workup will be continued and followed by the physician or advanced practice provider that is assigned to the patient when roomed.

## 2023-03-27 NOTE — ED PROVIDER NOTES
Encounter Date: 3/26/2023       History     Chief Complaint   Patient presents with    Nausea    Vomiting    Fatigue     Also  c/o  left  sided  flank  pain     The patient presents with reports intermittent n/v for 2 days, mild abd cramping with vomiting, denies abd pain at this time.  The onset was 2 days ago.  The course/duration of symptoms is fluctuating in intensity.  The character of symptoms is crampy.  The degree at onset was minimal.  The Location of pain at onset was diffuse and abdominal.  The degree at present is none.  Radiating pain: none. There are exacerbating factors including eating and movement.  There are relieving factors including rest and vomiting.  Therapy today: zofran.  Risk factors consist of history of marijuana use.  Associated symptoms: nausea, vomiting, denies chest pain, denies diarrhea, denies back pain, denies shortness of breath, denies fever, denies chills, denies headache and denies dizziness.  Additional history: was seen here 2 days ago for same with negative CT of abdomen/pelvis.      Review of patient's allergies indicates:  No Known Allergies  Past Medical History:   Diagnosis Date    At risk for alteration in comfort     Obesity, unspecified     Tobacco abuse      Past Surgical History:   Procedure Laterality Date    arm repair Right     LAPAROSCOPIC APPENDECTOMY N/A 10/22/2022    Procedure: APPENDECTOMY, LAPAROSCOPIC;  Surgeon: Vinay Sims Jr., MD;  Location: [facility];  Service: General;  Laterality: N/A;     Family History   Problem Relation Age of Onset    Hypertension Mother     No Known Problems Father      Social History     Tobacco Use    Smoking status: Every Day     Packs/day: 0.50     Types: Cigarettes    Smokeless tobacco: Never   Substance Use Topics    Alcohol use: Yes     Comment: occasionally    Drug use: Yes     Frequency: 7.0 times per week     Types: Marijuana     Review of Systems   Constitutional:  Negative for fever.   HENT:  Negative for sore  throat.    Respiratory:  Negative for shortness of breath.    Cardiovascular:  Negative for chest pain.   Gastrointestinal:  Positive for abdominal pain, nausea and vomiting.   Genitourinary:  Negative for dysuria.   Musculoskeletal:  Negative for back pain.   Skin:  Negative for rash.   Neurological:  Negative for weakness.   Hematological:  Does not bruise/bleed easily.   All other systems reviewed and are negative.    Physical Exam     Initial Vitals [03/26/23 1910]   BP Pulse Resp Temp SpO2   (!) 158/81 (!) 53 20 98.6 °F (37 °C) 100 %      MAP       --         Physical Exam    Nursing note and vitals reviewed.  Constitutional: He appears well-developed and well-nourished.   HENT:   Head: Normocephalic and atraumatic.   Neck: Neck supple.   Normal range of motion.  Cardiovascular:  Normal rate, regular rhythm, normal heart sounds and intact distal pulses.           Pulmonary/Chest: Breath sounds normal.   Abdominal: Abdomen is soft. Bowel sounds are normal. There is no abdominal tenderness.   Musculoskeletal:         General: Normal range of motion.      Cervical back: Normal range of motion and neck supple.     Neurological: He is alert and oriented to person, place, and time. He has normal strength.   Skin: Skin is warm and dry.   Psychiatric: He has a normal mood and affect.       ED Course   Procedures  Labs Reviewed   COMPREHENSIVE METABOLIC PANEL - Abnormal; Notable for the following components:       Result Value    Glucose Level 131 (*)     All other components within normal limits   URINALYSIS, REFLEX TO URINE CULTURE - Abnormal; Notable for the following components:    Protein, UA 1+ (*)     Ketones, UA Trace (*)     Squamous Epithelial Cells, UA Trace (*)     Mucous, UA Few (*)     All other components within normal limits   DRUG SCREEN, URINE (BEAKER) - Abnormal; Notable for the following components:    Cannabinoids, Urine Positive (*)     All other components within normal limits    Narrative:      Cut off concentrations:    Amphetamines - 1000 ng/ml  Barbiturates - 200 ng/ml  Benzodiazepine - 200 ng/ml  Cannabinoids (THC) - 50 ng/ml  Cocaine - 300 ng/ml  Fentanyl - 1.0 ng/ml  MDMA - 500 ng/ml  Opiates - 300 ng/ml   Phencyclidine (PCP) - 25 ng/ml    Specimen submitted for drug analysis and tested for pH and specific gravity in order to evaluate sample integrity. Suspect tampering if specific gravity is <1.003 and/or pH is not within the range of 4.5 - 8.0  False negatives may result form substances such as bleach added to urine.  False positives may result for the presence of a substance with similar chemical structure to the drug or its metabolite.    This test provides only a PRELIMINARY analytical test result. A more specific alternate chemical method must be used in order to obtain a confirmed analytical result. Gas chromatography/mass spectrometry (GC/MS) is the preferred confirmatory method. Other chemical confirmation methods are available. Clinical consideration and professional judgement should be applied to any drug of abuse test result, particularly when preliminary positive results are used.    Positive results will be confirmed only at the physicians request. Unconfirmed screening results are to be used only for medical purposes (treatment).        CBC W/ AUTO DIFFERENTIAL    Narrative:     The following orders were created for panel order CBC auto differential.  Procedure                               Abnormality         Status                     ---------                               -----------         ------                     CBC with Differential[[id number]]                            Final result                 Please view results for these tests on the individual orders.   CBC WITH DIFFERENTIAL   EXTRA TUBES    Narrative:     The following orders were created for panel order EXTRA TUBES.  Procedure                               Abnormality         Status                     ---------                                -----------         ------                     Light Blue Top Hold[[id number]]                              In process                 Light Green Top Hold[[id number]]                             In process                 Lavender Top Hold[[id number]]                                In process                 Gold Top Hold[[id number]]                                    In process                   Please view results for these tests on the individual orders.   LIGHT BLUE TOP HOLD   LIGHT GREEN TOP HOLD   LAVENDER TOP HOLD   GOLD TOP HOLD          Imaging Results    None          Medications   sodium chloride 0.9% bolus 1,000 mL 1,000 mL (0 mLs Intravenous Stopped 3/26/23 2221)   diphenhydrAMINE injection 50 mg (50 mg Intravenous Given 3/26/23 2121)   ketorolac injection 15 mg (15 mg Intravenous Given 3/26/23 2120)   ondansetron injection 4 mg (4 mg Intravenous Given 3/26/23 2245)   haloperidol lactate injection 2.5 mg (2.5 mg Intramuscular Given 3/26/23 2343)     Medical Decision Making:   History:   Old Records Summarized: records from clinic visits and records from previous admission(s).  Clinical Tests:   Lab Tests: Ordered and Reviewed  Re-eval at 1150: feels better, abd soft/nontender, counseled on marijuana use, will refer to medicine clinic per request for a pcp, strict return to ER instructions given    11:52 PM DISPOSITION: The patient is resting comfortably in no acute distress.  He is hemodynamically stable and is without objective evidence for acute process requiring urgent intervention or hospitalization. I provided counseling to patient with regard to condition, the treatment plan, specific conditions for return, and the importance of follow up. Detailed written and verbal instructions provided to patient and he expressed a verbal understanding of the discharge instructions and overall management plan. Reiterated the importance of medication administration and safety and advised  patient to follow up with primary care provider in 3-5 days or sooner if needed.  Answered questions at this time. The patient is stable for discharge.                             Clinical Impression:   Final diagnoses:  [R11.2, F12.90] Cannabinoid hyperemesis syndrome (Primary)        ED Disposition Condition    Discharge Stable          ED Prescriptions       Medication Sig Dispense Start Date End Date Auth. Provider    capsaicin (ZOSTRIX) 0.025 % cream Apply topically 3 (three) times daily as needed (nausea). Apply to abdomen 3 times a day as needed for nausea, avoid contact with face and eyes 60 g 3/26/2023 -- COLLEEN Mendoza          Follow-up Information       Follow up With Specialties Details Why Contact Info    referral sent to medicine clinic per request for a pcp        [facility] Emergency Medicine  If symptoms worsen [address] [facility] [id number] [phone]             COLLEEN Mendoza  03/26/23 9643       COLLEEN Mendoza  03/27/23 0002

## 2023-04-05 NOTE — ASSESSMENT & PLAN NOTE
Educated on increasing foods high in Vitamin D such as fish oil, cod liver oil, salmon, milk fortified with vitamin D.  RX Vitamin D3 92223 IU weekly x 12 weeks.  Complete entire 12 weeks of Vitamin D prescription.  After completion of prescription (12 weeks/3 months), begin taking Vitamin D 2000 I.U. tablets daily (purchase over the counter).  Repeat Vitamin D level as ordered.     No

## 2023-04-19 ENCOUNTER — PATIENT MESSAGE (OUTPATIENT)
Dept: RESEARCH | Facility: HOSPITAL | Age: 33
End: 2023-04-19
Payer: COMMERCIAL

## 2023-04-25 ENCOUNTER — HOSPITAL ENCOUNTER (EMERGENCY)
Facility: HOSPITAL | Age: 33
Discharge: HOME | End: 2023-04-25
Attending: FAMILY MEDICINE

## 2023-04-25 VITALS
RESPIRATION RATE: 18 BRPM | DIASTOLIC BLOOD PRESSURE: 78 MMHG | HEIGHT: 71 IN | SYSTOLIC BLOOD PRESSURE: 137 MMHG | HEART RATE: 80 BPM | BODY MASS INDEX: 36.68 KG/M2 | WEIGHT: 262 LBS | TEMPERATURE: 99 F | OXYGEN SATURATION: 100 %

## 2023-04-25 DIAGNOSIS — M72.2 PLANTAR FASCIITIS OF LEFT FOOT: ICD-10-CM

## 2023-04-25 DIAGNOSIS — M54.31 SCIATICA OF RIGHT SIDE: Primary | ICD-10-CM

## 2023-04-25 PROCEDURE — 99284 EMERGENCY DEPT VISIT MOD MDM: CPT

## 2023-04-25 PROCEDURE — 96372 THER/PROPH/DIAG INJ SC/IM: CPT | Performed by: NURSE PRACTITIONER

## 2023-04-25 PROCEDURE — 63600175 PHARM REV CODE 636 W HCPCS: Performed by: NURSE PRACTITIONER

## 2023-04-25 RX ORDER — INDOMETHACIN 25 MG/1
25 CAPSULE ORAL 2 TIMES DAILY PRN
Qty: 14 CAPSULE | Refills: 0 | Status: SHIPPED | OUTPATIENT
Start: 2023-04-25 | End: 2023-05-02

## 2023-04-25 RX ORDER — BACLOFEN 10 MG/1
10 TABLET ORAL 3 TIMES DAILY PRN
Qty: 21 TABLET | Refills: 0 | Status: SHIPPED | OUTPATIENT
Start: 2023-04-25 | End: 2023-05-26 | Stop reason: ALTCHOICE

## 2023-04-25 RX ORDER — KETOROLAC TROMETHAMINE 30 MG/ML
30 INJECTION, SOLUTION INTRAMUSCULAR; INTRAVENOUS
Status: COMPLETED | OUTPATIENT
Start: 2023-04-25 | End: 2023-04-25

## 2023-04-25 RX ADMIN — KETOROLAC TROMETHAMINE 30 MG: 30 INJECTION, SOLUTION INTRAMUSCULAR; INTRAVENOUS at 12:04

## 2023-04-25 NOTE — ED PROVIDER NOTES
Encounter Date: 4/25/2023       History     Chief Complaint   Patient presents with    Leg Pain     Chronic rt leg pain and Lt leg painful, Did play basket ball last night.       Pt is a 33 y.o. male who presents to the [facility] ED complaining of Lt foot and Rt leg pain. Reports Lt foot pain began after he played basketball yesterday. Denies redness to affected to extremity, chest pain, SOB, weakness, dizziness, or fever. Admits the pain to his RT leg has been ongoing for months, worse with prolonged sitting or standing. Denies having a PCP.     Review of patient's allergies indicates:  No Known Allergies  Past Medical History:   Diagnosis Date    At risk for alteration in comfort     Obesity, unspecified     Tobacco abuse      Past Surgical History:   Procedure Laterality Date    arm repair Right     LAPAROSCOPIC APPENDECTOMY N/A 10/22/2022    Procedure: APPENDECTOMY, LAPAROSCOPIC;  Surgeon: Vinay Sims Jr., MD;  Location: [facility];  Service: General;  Laterality: N/A;     Family History   Problem Relation Age of Onset    Hypertension Mother     No Known Problems Father      Social History     Tobacco Use    Smoking status: Every Day     Packs/day: 0.50     Types: Cigarettes    Smokeless tobacco: Never   Substance Use Topics    Alcohol use: Yes     Comment: occasionally    Drug use: Yes     Frequency: 7.0 times per week     Types: Marijuana     Review of Systems   Constitutional:  Negative for chills, diaphoresis, fatigue and fever.   HENT:  Negative for facial swelling, postnasal drip, rhinorrhea, sinus pressure, sinus pain, sore throat and trouble swallowing.    Respiratory:  Negative for cough, chest tightness, shortness of breath and wheezing.    Cardiovascular:  Negative for chest pain, palpitations and leg swelling.   Gastrointestinal:  Negative for abdominal pain, diarrhea, nausea and vomiting.   Genitourinary:  Negative for dysuria, flank pain, hematuria and urgency.   Musculoskeletal:  Positive for  arthralgias and myalgias. Negative for back pain.   Skin:  Negative for color change and rash.   Neurological:  Negative for dizziness, syncope, weakness and headaches.   Hematological:  Does not bruise/bleed easily.   All other systems reviewed and are negative.    Physical Exam     Initial Vitals [04/25/23 1202]   BP Pulse Resp Temp SpO2   137/78 80 18 98.6 °F (37 °C) 100 %      MAP       --         Physical Exam    Nursing note and vitals reviewed.  Constitutional: Vital signs are normal. He appears well-developed and well-nourished.   HENT:   Head: Normocephalic.   Nose: Nose normal.   Mouth/Throat: Oropharynx is clear and moist.   Eyes: Conjunctivae and EOM are normal. Pupils are equal, round, and reactive to light.   Neck: Neck supple.   Normal range of motion.  Cardiovascular:  Normal rate, regular rhythm, normal heart sounds and intact distal pulses.           Pulmonary/Chest: Effort normal and breath sounds normal. No respiratory distress. He has no wheezes. He has no rhonchi. He has no rales. He exhibits no tenderness.   Abdominal: Abdomen is soft and flat. Bowel sounds are normal. There is no abdominal tenderness. There is no rebound, no guarding, no tenderness at McBurney's point and negative Juares's sign.   Musculoskeletal:         General: Normal range of motion.      Cervical back: Normal range of motion and neck supple.      Right upper leg: Tenderness present. No swelling or bony tenderness.      Right lower leg: No edema.      Left lower leg: No edema.      Left foot: Normal range of motion and normal capillary refill. Swelling and tenderness present. No deformity. Normal pulse.        Legs:         Feet:      Neurological: He is alert and oriented to person, place, and time. He has normal strength.   Skin: Skin is warm and dry. Capillary refill takes less than 2 seconds.   Psychiatric: He has a normal mood and affect. His behavior is normal. Judgment and thought content normal.       ED Course    Procedures  Labs Reviewed - No data to display       Imaging Results    None          Medications   ketorolac injection 30 mg (has no administration in time range)     Medical Decision Making:   Differential Diagnosis:   Plantar fasciitis  Sciatic  ED Management:  12:43 PM Reassessed patient at this time. Reports condition has improved. Discussed with patient all pertinent ED information and results. Discussed diagnosis and treatment plan with patient. Follow up instructions and return to ED instruction have been given. All questions and concerns were addressed at this time. Patient voices understanding of information and instructions. Patient is comfortable with plan and discharge. Patient is stable for discharge.                           Clinical Impression:   Final diagnoses:  [M54.31] Sciatica of right side (Primary)  [M72.2] Plantar fasciitis of left foot        ED Disposition Condition    Discharge Stable          ED Prescriptions       Medication Sig Dispense Start Date End Date Auth. Provider    baclofen (LIORESAL) 10 MG tablet Take 1 tablet (10 mg total) by mouth 3 (three) times daily as needed (muscle spasms). 21 tablet 4/25/2023 5/2/2023 Andreas Acevedo Jr., FNP    indomethacin (INDOCIN) 25 MG capsule Take 1 capsule (25 mg total) by mouth 2 (two) times daily as needed (pain). 14 capsule 4/25/2023 5/2/2023 Andreas Acevedo Jr., FNP          Follow-up Information       Follow up With Specialties Details Why Contact Info    [facility] Emergency Medicine In 3 days As needed, If symptoms worsen [address] [facility] [id number] [phone]    [facility]  Schedule an appointment as soon as possible for a visit in 1 week Follow up with [facility] to obtain a PCP [facility] [facility] [id number]             Andreas Acevedo Jr., FNP  04/25/23 2523

## 2023-05-18 ENCOUNTER — OFFICE VISIT (OUTPATIENT)
Dept: INTERNAL MEDICINE | Facility: CLINIC | Age: 33
End: 2023-05-18

## 2023-05-18 ENCOUNTER — APPOINTMENT (OUTPATIENT)
Dept: LAB | Facility: HOSPITAL | Age: 33
End: 2023-05-18
Attending: NURSE PRACTITIONER

## 2023-05-18 VITALS
RESPIRATION RATE: 16 BRPM | BODY MASS INDEX: 36.12 KG/M2 | DIASTOLIC BLOOD PRESSURE: 74 MMHG | HEART RATE: 74 BPM | HEIGHT: 71 IN | TEMPERATURE: 98 F | WEIGHT: 258 LBS | SYSTOLIC BLOOD PRESSURE: 153 MMHG

## 2023-05-18 DIAGNOSIS — M79.604 BILATERAL LEG PAIN: ICD-10-CM

## 2023-05-18 DIAGNOSIS — Z11.59 NEED FOR HEPATITIS C SCREENING TEST: ICD-10-CM

## 2023-05-18 DIAGNOSIS — Z00.00 WELLNESS EXAMINATION: Primary | ICD-10-CM

## 2023-05-18 DIAGNOSIS — M79.605 BILATERAL LEG PAIN: ICD-10-CM

## 2023-05-18 DIAGNOSIS — Z11.3 SCREENING EXAMINATION FOR STD (SEXUALLY TRANSMITTED DISEASE): ICD-10-CM

## 2023-05-18 DIAGNOSIS — F17.210 CIGARETTE NICOTINE DEPENDENCE WITHOUT COMPLICATION: Chronic | ICD-10-CM

## 2023-05-18 DIAGNOSIS — Z11.4 SCREENING FOR HIV (HUMAN IMMUNODEFICIENCY VIRUS): ICD-10-CM

## 2023-05-18 LAB
ALBUMIN SERPL-MCNC: 3.8 G/DL (ref 3.5–5)
ALBUMIN/GLOB SERPL: 1 RATIO (ref 1.1–2)
ALP SERPL-CCNC: 81 UNIT/L (ref 40–150)
ALT SERPL-CCNC: 30 UNIT/L (ref 0–55)
APPEARANCE UR: CLEAR
AST SERPL-CCNC: 59 UNIT/L (ref 5–34)
BACTERIA #/AREA URNS AUTO: ABNORMAL /HPF
BASOPHILS # BLD AUTO: 0.02 X10(3)/MCL
BASOPHILS NFR BLD AUTO: 0.3 %
BILIRUB UR QL STRIP.AUTO: NEGATIVE MG/DL
BILIRUBIN DIRECT+TOT PNL SERPL-MCNC: 1.2 MG/DL
BUN SERPL-MCNC: 10.3 MG/DL (ref 8.9–20.6)
C TRACH DNA SPEC QL NAA+PROBE: NOT DETECTED
CALCIUM SERPL-MCNC: 9.5 MG/DL (ref 8.4–10.2)
CHLORIDE SERPL-SCNC: 106 MMOL/L (ref 98–107)
CHOLEST SERPL-MCNC: 226 MG/DL
CHOLEST/HDLC SERPL: 6 {RATIO} (ref 0–5)
CO2 SERPL-SCNC: 25 MMOL/L (ref 22–29)
COLOR UR: YELLOW
CREAT SERPL-MCNC: 1.11 MG/DL (ref 0.73–1.18)
DEPRECATED CALCIDIOL+CALCIFEROL SERPL-MC: 11.6 NG/ML (ref 30–80)
EOSINOPHIL # BLD AUTO: 0.21 X10(3)/MCL (ref 0–0.9)
EOSINOPHIL NFR BLD AUTO: 3.3 %
ERYTHROCYTE [DISTWIDTH] IN BLOOD BY AUTOMATED COUNT: 11.9 % (ref 11.5–17)
EST. AVERAGE GLUCOSE BLD GHB EST-MCNC: 96.8 MG/DL
GFR SERPLBLD CREATININE-BSD FMLA CKD-EPI: >60 MLS/MIN/1.73/M2
GLOBULIN SER-MCNC: 3.7 GM/DL (ref 2.4–3.5)
GLUCOSE SERPL-MCNC: 119 MG/DL (ref 74–100)
GLUCOSE UR QL STRIP.AUTO: NORMAL MG/DL
HBA1C MFR BLD: 5 %
HCT VFR BLD AUTO: 46.2 % (ref 42–52)
HCV AB SERPL QL IA: NONREACTIVE
HDLC SERPL-MCNC: 37 MG/DL (ref 35–60)
HGB BLD-MCNC: 15.4 G/DL (ref 14–18)
HIV 1+2 AB+HIV1 P24 AG SERPL QL IA: NONREACTIVE
HYALINE CASTS #/AREA URNS LPF: ABNORMAL /LPF
IMM GRANULOCYTES # BLD AUTO: 0.01 X10(3)/MCL (ref 0–0.04)
IMM GRANULOCYTES NFR BLD AUTO: 0.2 %
KETONES UR QL STRIP.AUTO: ABNORMAL MG/DL
LDLC SERPL CALC-MCNC: 166 MG/DL (ref 50–140)
LEUKOCYTE ESTERASE UR QL STRIP.AUTO: NEGATIVE UNIT/L
LYMPHOCYTES # BLD AUTO: 2.14 X10(3)/MCL (ref 0.6–4.6)
LYMPHOCYTES NFR BLD AUTO: 33.6 %
MCH RBC QN AUTO: 28.7 PG (ref 27–31)
MCHC RBC AUTO-ENTMCNC: 33.3 G/DL (ref 33–36)
MCV RBC AUTO: 86 FL (ref 80–94)
MONOCYTES # BLD AUTO: 0.4 X10(3)/MCL (ref 0.1–1.3)
MONOCYTES NFR BLD AUTO: 6.3 %
MUCOUS THREADS URNS QL MICRO: ABNORMAL /LPF
N GONORRHOEA DNA SPEC QL NAA+PROBE: NOT DETECTED
NEUTROPHILS # BLD AUTO: 3.59 X10(3)/MCL (ref 2.1–9.2)
NEUTROPHILS NFR BLD AUTO: 56.3 %
NITRITE UR QL STRIP.AUTO: NEGATIVE
NRBC BLD AUTO-RTO: 0 %
PH UR STRIP.AUTO: 5.5 [PH]
PLATELET # BLD AUTO: 258 X10(3)/MCL (ref 130–400)
PMV BLD AUTO: 10.2 FL (ref 7.4–10.4)
POTASSIUM SERPL-SCNC: 3.5 MMOL/L (ref 3.5–5.1)
PROT SERPL-MCNC: 7.5 GM/DL (ref 6.4–8.3)
PROT UR QL STRIP.AUTO: ABNORMAL MG/DL
RBC # BLD AUTO: 5.37 X10(6)/MCL (ref 4.7–6.1)
RBC #/AREA URNS AUTO: ABNORMAL /HPF
RBC UR QL AUTO: NEGATIVE UNIT/L
SODIUM SERPL-SCNC: 140 MMOL/L (ref 136–145)
SP GR UR STRIP.AUTO: 1.04
SQUAMOUS #/AREA URNS LPF: ABNORMAL /HPF
T PALLIDUM AB SER QL: NONREACTIVE
T4 FREE SERPL-MCNC: 0.96 NG/DL (ref 0.7–1.48)
TRIGL SERPL-MCNC: 115 MG/DL (ref 34–140)
TSH SERPL-ACNC: 0.71 UIU/ML (ref 0.35–4.94)
UROBILINOGEN UR STRIP-ACNC: ABNORMAL MG/DL
VLDLC SERPL CALC-MCNC: 23 MG/DL
WBC # SPEC AUTO: 6.37 X10(3)/MCL (ref 4.5–11.5)
WBC #/AREA URNS AUTO: ABNORMAL /HPF

## 2023-05-18 PROCEDURE — 83036 HEMOGLOBIN GLYCOSYLATED A1C: CPT | Performed by: NURSE PRACTITIONER

## 2023-05-18 PROCEDURE — 80061 LIPID PANEL: CPT | Performed by: NURSE PRACTITIONER

## 2023-05-18 PROCEDURE — 85025 COMPLETE CBC W/AUTO DIFF WBC: CPT | Performed by: NURSE PRACTITIONER

## 2023-05-18 PROCEDURE — 87591 N.GONORRHOEAE DNA AMP PROB: CPT | Performed by: NURSE PRACTITIONER

## 2023-05-18 PROCEDURE — 99395 PREV VISIT EST AGE 18-39: CPT | Mod: S$PBB,25,, | Performed by: NURSE PRACTITIONER

## 2023-05-18 PROCEDURE — 82306 VITAMIN D 25 HYDROXY: CPT | Performed by: NURSE PRACTITIONER

## 2023-05-18 PROCEDURE — 84439 ASSAY OF FREE THYROXINE: CPT | Performed by: NURSE PRACTITIONER

## 2023-05-18 PROCEDURE — 86803 HEPATITIS C AB TEST: CPT | Performed by: NURSE PRACTITIONER

## 2023-05-18 PROCEDURE — 87389 HIV-1 AG W/HIV-1&-2 AB AG IA: CPT | Performed by: NURSE PRACTITIONER

## 2023-05-18 PROCEDURE — 81001 URINALYSIS AUTO W/SCOPE: CPT | Performed by: NURSE PRACTITIONER

## 2023-05-18 PROCEDURE — 86780 TREPONEMA PALLIDUM: CPT | Performed by: NURSE PRACTITIONER

## 2023-05-18 PROCEDURE — 84443 ASSAY THYROID STIM HORMONE: CPT | Performed by: NURSE PRACTITIONER

## 2023-05-18 PROCEDURE — 36415 COLL VENOUS BLD VENIPUNCTURE: CPT | Performed by: NURSE PRACTITIONER

## 2023-05-18 PROCEDURE — 99395 PR PREVENTIVE VISIT,EST,18-39: ICD-10-PCS | Mod: S$PBB,25,, | Performed by: NURSE PRACTITIONER

## 2023-05-18 PROCEDURE — 99406 PR TOBACCO USE CESSATION INTERMEDIATE 3-10 MINUTES: ICD-10-PCS | Mod: S$PBB,,, | Performed by: NURSE PRACTITIONER

## 2023-05-18 PROCEDURE — 99406 BEHAV CHNG SMOKING 3-10 MIN: CPT | Mod: S$PBB,,, | Performed by: NURSE PRACTITIONER

## 2023-05-18 PROCEDURE — 80053 COMPREHEN METABOLIC PANEL: CPT | Performed by: NURSE PRACTITIONER

## 2023-05-18 PROCEDURE — 99213 OFFICE O/P EST LOW 20 MIN: CPT | Mod: PBBFAC | Performed by: NURSE PRACTITIONER

## 2023-05-18 NOTE — PROGRESS NOTES
SUHAIL Crowder   [facility]  [address] [facility] [address]      PATIENT NAME: Figueroa Murcia  : 1990  DATE: 23  MRN: [id number]      Patient PCP Information       Provider PCP Type    SUHAIL Crowder [facility]            Reason for Visit / Chief Complaint: Establish Care (Knot on outer right  lower leg above ankle causing pain in whole leg)       History of Present Illness / Problem Focused Workflow     Figueroa Murcia presents to the clinic with Establish Care (Knot on outer right  lower leg above ankle causing pain in whole leg)     34 yo AAM here to establish care. PMH Tobacco use ( ppd) + THC.     2023  Pt c/o of Right Leg x 6 months, recent EV visit, after running feels like his legs are going to give out, after standing for too long pain occurs so he sits down and then after sitting the pain occurs as well, pain is in entire leg, reports sometimes the left leg hurts but not as much. Noted varicose veins to bilateral legs. Agreeable to arterial US order today, will to scheudle. Pt agreeable to routine wellness labs today, will f/u when available. Denies any other issues or concerns at this time.   Denies chest pain, shortness of breath, cough, fever, headache, dizziness, weakness, abdominal pain, nausea, vomiting, diarrhea, constipation, black/bloody stools, unplanned weight loss, night sweats, changes in urinary patterns, burning/odor with urination, depression, anxiety, and SI/HI.             Review of Systems     Review of Systems      Medications and Allergies     Medications  Current Outpatient Medications   Medication Instructions    baclofen (LIORESAL) 10 mg, Oral, 3 times daily PRN    capsaicin (ZOSTRIX) 0.025 % cream Topical (Top), 3 times daily PRN, Apply to abdomen 3 times a day as needed for nausea, avoid contact with face and eyes         Allergies  Review of patient's allergies indicates:  No Known  "Allergies    Physical Examination     Visit Vitals  BP (!) 153/74   Pulse 74   Temp 98 °F (36.7 °C)   Resp 16   Ht 5' 11" (1.803 m)   Wt 117 kg (258 lb)   BMI 35.98 kg/m²       Physical Exam      Results             Assessment        ICD-10-CM ICD-9-CM   1. Wellness examination  Z00.00 V70.0   2. Bilateral leg pain  M79.604 729.5    M79.605    3. Screening examination for STD (sexually transmitted disease)  Z11.3 V74.5   4. Screening for HIV (human immunodeficiency virus)  Z11.4 V73.89   5. Need for hepatitis C screening test  Z11.59 V73.89   6. Cigarette nicotine dependence without complication  F17.210 305.1        Plan      Problem List Items Addressed This Visit          Orthopedic    Bilateral leg pain    Current Assessment & Plan     US Arterial Bilateral LE           Relevant Orders    CV Ultrasound doppler arterial legs bilat       Other    Cigarette nicotine dependence without complication (Chronic)    Current Assessment & Plan     Smoking cessation discussed > 3 mins.  Pt not ready to quit.  Discussed benefits of quitting including improved health, decreased cardiac/vascular/pulmonary/stroke risks as well as saving money.             Wellness examination - Primary    Current Assessment & Plan     Pt wellness visit completed today with appropriate lab work.              Relevant Orders    Hemoglobin A1C    Urinalysis, Reflex to Urine Culture    T4, Free    TSH    Vitamin D    Lipid Panel    Comprehensive Metabolic Panel    CBC Auto Differential    Vitamin D    Urinalysis, Reflex to Urine Culture    Hemoglobin A1C    T4, Free    TSH    Lipid Panel    Comprehensive Metabolic Panel    CBC Auto Differential     Other Visit Diagnoses       Screening examination for STD (sexually transmitted disease)        Relevant Orders    Chlamydia/GC, PCR    SYPHILIS ANTIBODY (WITH REFLEX RPR)    Screening for HIV (human immunodeficiency virus)        Relevant Orders    HIV 1/2 Ag/Ab (4th Gen)    Need for hepatitis C " screening test        Relevant Orders    Hepatitis C Antibody            Future Appointments   Date Time Provider Department Center   7/27/2023 10:00 AM [facility] Crow[facility]         Follow up in about 1 year (around 5/18/2024).      Signature:     [facility]  [address] [facility] [id number]    Date of encounter: 5/18/23

## 2023-05-24 ENCOUNTER — HOSPITAL ENCOUNTER (OUTPATIENT)
Dept: CARDIOLOGY | Facility: HOSPITAL | Age: 33
Discharge: HOME | End: 2023-05-24
Attending: NURSE PRACTITIONER

## 2023-05-24 DIAGNOSIS — M79.604 BILATERAL LEG PAIN: ICD-10-CM

## 2023-05-24 DIAGNOSIS — M79.605 BILATERAL LEG PAIN: ICD-10-CM

## 2023-05-24 DIAGNOSIS — R52 PAIN: ICD-10-CM

## 2023-05-24 PROCEDURE — 93925 LOWER EXTREMITY STUDY: CPT

## 2023-05-24 PROCEDURE — 93925 LOWER EXTREMITY STUDY: CPT | Mod: ,,, | Performed by: SURGERY

## 2023-05-24 PROCEDURE — 93924 ANKLE BRACHIAL INDICES (ABI): ICD-10-PCS | Mod: ,,, | Performed by: SURGERY

## 2023-05-24 PROCEDURE — 93924 LWR XTR VASC STDY BILAT: CPT | Mod: ,,, | Performed by: SURGERY

## 2023-05-24 PROCEDURE — 93924 LWR XTR VASC STDY BILAT: CPT

## 2023-05-24 PROCEDURE — 93925 CV US DOPPLER ARTERIAL LEGS BILATERAL (CUPID ONLY): ICD-10-PCS | Mod: ,,, | Performed by: SURGERY

## 2023-05-26 ENCOUNTER — OFFICE VISIT (OUTPATIENT)
Dept: INTERNAL MEDICINE | Facility: CLINIC | Age: 33
End: 2023-05-26

## 2023-05-26 DIAGNOSIS — E78.2 MIXED HYPERLIPIDEMIA: Primary | ICD-10-CM

## 2023-05-26 DIAGNOSIS — E55.9 VITAMIN D DEFICIENCY: ICD-10-CM

## 2023-05-26 LAB
IMMEDIATE ARM BP: 125 MMHG
IMMEDIATE LEFT ABI: 1.44
IMMEDIATE LEFT TIBIAL: 180 MMHG
IMMEDIATE RIGHT ABI: 1.34
IMMEDIATE RIGHT TIBIAL: 168 MMHG
LEFT ABI: 1.44
LEFT ARM BP: 112 MMHG
LEFT DORSALIS PEDIS: 146 MMHG
LEFT POSTERIOR TIBIAL: 164 MMHG
OHS CV LEFT LOWER EXTREMITY ABI (NO CALC): 1.4
OHS CV RIGHT ABI LOWER EXTREMITY (NO CALC): 1.3
RIGHT ABI: 1.34
RIGHT ARM BP: 114 MMHG
RIGHT DORSALIS PEDIS: 145 MMHG
RIGHT POSTERIOR TIBIAL: 153 MMHG

## 2023-05-26 PROCEDURE — 99214 OFFICE O/P EST MOD 30 MIN: CPT | Mod: 95,,, | Performed by: NURSE PRACTITIONER

## 2023-05-26 PROCEDURE — 99214 PR OFFICE/OUTPT VISIT, EST, LEVL IV, 30-39 MIN: ICD-10-PCS | Mod: 95,,, | Performed by: NURSE PRACTITIONER

## 2023-05-26 RX ORDER — ATORVASTATIN CALCIUM 10 MG/1
10 TABLET, FILM COATED ORAL NIGHTLY
Qty: 30 TABLET | Refills: 3 | Status: SHIPPED | OUTPATIENT
Start: 2023-05-26 | End: 2023-08-31 | Stop reason: SDUPTHER

## 2023-05-26 RX ORDER — ERGOCALCIFEROL 1.25 MG/1
50000 CAPSULE ORAL
Qty: 4 CAPSULE | Refills: 1 | Status: SHIPPED | OUTPATIENT
Start: 2023-05-26 | End: 2023-07-15

## 2023-05-26 NOTE — PROGRESS NOTES
Aida Jackson, SUHAIL   [facility]  [address] [facility] [address]      PATIENT NAME: Figueroa Murcia  : 1990  DATE: 23  MRN: [id number]      Reason for Visit / Chief Complaint: Follow-up (Lab review / MRI of left )       History of Present Illness / Problem Focused Workflow     Figueroa Murcia presents to the clinic with Follow-up (Lab review / MRI of left )     34 yo AAM here today for f/u. PMH Tobacco use (1/2 ppd) + THC.     2023  Pt c/o of Right Leg x 6 months, recent EV visit, after running feels like his legs are going to give out, after standing for too long pain occurs so he sits down and then after sitting the pain occurs as well, pain is in entire leg, reports sometimes the left leg hurts but not as much. Noted varicose veins to bilateral legs. Agreeable to arterial US order today, will to scheudle. Pt agreeable to routine wellness labs today, will f/u when available. Denies any other issues or concerns at this time.     2023  Pt here today for f/u for lab review. Discussed low vitamin D and elevated FLP today. The pt is agreeable to starting RX statin for HLD, the pt BP is noted to be elevated on last encounters, will bring hiim back F2F in a few weeks to evaluate his BP. Agreeable to 3 month virtual f/u for lab review for HLD eval. Denies any other issues or concerns at this time.   Denies chest pain, shortness of breath, cough, fever, headache, dizziness, weakness, abdominal pain, nausea, vomiting, diarrhea, constipation, black/bloody stools, unplanned weight loss, night sweats, changes in urinary patterns, burning/odor with urination, depression, anxiety, and SI/HI.       Follow-up        Review of Systems     Review of Systems   Constitutional: Negative.    HENT: Negative.     Eyes: Negative.    Respiratory: Negative.     Cardiovascular: Negative.    Gastrointestinal: Negative.    Endocrine: Negative.     Genitourinary: Negative.    Neurological: Negative.    Psychiatric/Behavioral: Negative.         Medications and Allergies     Medications  Medication List with Changes/Refills   New Medications    ATORVASTATIN (LIPITOR) 10 MG TABLET    Take 1 tablet (10 mg total) by mouth every evening. For High Cholesterol    ERGOCALCIFEROL (ERGOCALCIFEROL) 50,000 UNIT CAP    Take 1 capsule (50,000 Units total) by mouth every 7 days. For Low Vitamin D. Start over the counter when completed. for 8 doses   Discontinued Medications    BACLOFEN (LIORESAL) 10 MG TABLET    Take 1 tablet (10 mg total) by mouth 3 (three) times daily as needed (muscle spasms).    CAPSAICIN (ZOSTRIX) 0.025 % CREAM    Apply topically 3 (three) times daily as needed (nausea). Apply to abdomen 3 times a day as needed for nausea, avoid contact with face and eyes         Allergies  Review of patient's allergies indicates:  No Known Allergies    Physical Examination   There were no vitals filed for this visit.  Physical Exam  HENT:      Right Ear: Hearing normal.      Left Ear: Hearing normal.   Neurological:      Mental Status: He is alert and oriented to person, place, and time.   Psychiatric:         Mood and Affect: Mood normal.         Results     Lab Results   Component Value Date    WBC 6.37 05/18/2023    RBC 5.37 05/18/2023    HGB 15.4 05/18/2023    HCT 46.2 05/18/2023    MCV 86.0 05/18/2023    MCH 28.7 05/18/2023    MCHC 33.3 05/18/2023    RDW 11.9 05/18/2023     05/18/2023    MPV 10.2 05/18/2023     Sodium Level   Date Value Ref Range Status   05/18/2023 140 136 - 145 mmol/L Final     Potassium Level   Date Value Ref Range Status   05/18/2023 3.5 3.5 - 5.1 mmol/L Final     Carbon Dioxide   Date Value Ref Range Status   05/18/2023 25 22 - 29 mmol/L Final     Blood Urea Nitrogen   Date Value Ref Range Status   05/18/2023 10.3 8.9 - 20.6 mg/dL Final     Creatinine   Date Value Ref Range Status   05/18/2023 1.11 0.73 - 1.18 mg/dL Final     Calcium  Level Total   Date Value Ref Range Status   05/18/2023 9.5 8.4 - 10.2 mg/dL Final     Albumin Level   Date Value Ref Range Status   05/18/2023 3.8 3.5 - 5.0 g/dL Final     Bilirubin Total   Date Value Ref Range Status   05/18/2023 1.2 <=1.5 mg/dL Final     Alkaline Phosphatase   Date Value Ref Range Status   05/18/2023 81 40 - 150 unit/L Final     Aspartate Aminotransferase   Date Value Ref Range Status   05/18/2023 59 (H) 5 - 34 unit/L Final     Alanine Aminotransferase   Date Value Ref Range Status   05/18/2023 30 0 - 55 unit/L Final     Estimated GFR-Non    Date Value Ref Range Status   03/29/2022 >60       Lab Results   Component Value Date    CHOL 226 (H) 05/18/2023     Lab Results   Component Value Date    HDL 37 05/18/2023     No results found for: LDLCALC  Lab Results   Component Value Date    TRIG 115 05/18/2023     No results found for: CHOLHDL  Lab Results   Component Value Date    TSH 0.715 05/18/2023     Lab Results   Component Value Date    PHUR 7.0 09/18/2021    PROTEINUA 1+ (A) 05/18/2023    GLUCUA Negative 09/18/2021    KETONESU Negative 09/18/2021    OCCULTUA Negative 09/18/2021    NITRITE Negative 09/18/2021    LEUKOCYTESUR Negative 05/18/2023     Lab Results   Component Value Date    HGBA1C 5.0 05/18/2023    HGBA1C 5.1 07/21/2022           Assessment         ICD-10-CM ICD-9-CM   1. Mixed hyperlipidemia  E78.2 272.2   2. Vitamin D deficiency  E55.9 268.9       Plan      Problem List Items Addressed This Visit          Cardiac/Vascular    Mixed hyperlipidemia - Primary    Overview     Follow a low cholesterol, low saturated fat diet with less than 200 mg of cholesterol a day.   Avoid fried foods and high saturated fats (rodríguez, sausage, cookies, cakes, chips, cheese, whole milk, butter, mayonnaise, creamy dressings, gravy, stew, gumbo, boudin, cracklins and cream sauces).  Add flax seed or fish oil supplements to diet.   Increase dietary fiber.   Regular exercise improves cholesterol  levels.  Physical activity 5 times a week for 30 minutes per day (or 150 minutes per week).   Stressed importance of dietary modifications.           Current Assessment & Plan     FLP Elevated  Start RX Atorvastatin 10 mg q hs  3 month f/u with fasting labs            Relevant Medications    atorvastatin (LIPITOR) 10 MG tablet    Other Relevant Orders    Lipid Panel    Urinalysis, Reflex to Urine Culture    Basic Metabolic Panel       Endocrine    Vitamin D deficiency    Current Assessment & Plan     Educated on increasing foods high in Vitamin D such as fish oil, cod liver oil, salmon, milk fortified with vitamin D.  RX Vitamin D3 58287 IU weekly x 12 weeks.  Complete entire 12 weeks of Vitamin D prescription.  After completion of prescription (12 weeks/3 months), begin taking Vitamin D 2000 I.U. tablets daily (purchase over the counter).  Repeat Vitamin D level as ordered.           Relevant Medications    ergocalciferol (ERGOCALCIFEROL) 50,000 unit Cap       Future Appointments   Date Time Provider Department Center   5/20/2024  7:30 AM SUHAIL Crowder [facility]            Signature:     [facility]  [address] [facility] [address]    Date of encounter: 5/26/23    Audio Only Telehealth Visit     The patient location is: home  The chief complaint leading to consultation is: lab review   Visit type: Virtual visit with audio only (telephone)  Total time spent with patient: 15     The reason for the audio only service rather than synchronous audio and video virtual visit was related to technical difficulties or patient preference/necessity.     Each patient to whom I provide medical services by telemedicine is:  (1) informed of the relationship between the physician and patient and the respective role of any other health care provider with respect to management of the patient; and (2) notified that they may decline to receive medical  services by telemedicine and may withdraw from such care at any time. Patient verbally consented to receive this service via voice-only telephone call.       This service was not originating from a related E/M service provided within the previous 7 days nor will  to an E/M service or procedure within the next 24 hours or my soonest available appointment.  Prevailing standard of care was able to be met in this audio-only visit.

## 2023-05-26 NOTE — ASSESSMENT & PLAN NOTE
Educated on increasing foods high in Vitamin D such as fish oil, cod liver oil, salmon, milk fortified with vitamin D.  RX Vitamin D3 56511 IU weekly x 12 weeks.  Complete entire 12 weeks of Vitamin D prescription.  After completion of prescription (12 weeks/3 months), begin taking Vitamin D 2000 I.U. tablets daily (purchase over the counter).  Repeat Vitamin D level as ordered.

## 2023-06-20 ENCOUNTER — HOSPITAL ENCOUNTER (OUTPATIENT)
Dept: RADIOLOGY | Facility: HOSPITAL | Age: 33
Discharge: HOME | End: 2023-06-20
Attending: NURSE PRACTITIONER

## 2023-06-20 ENCOUNTER — OFFICE VISIT (OUTPATIENT)
Dept: INTERNAL MEDICINE | Facility: CLINIC | Age: 33
End: 2023-06-20
Payer: COMMERCIAL

## 2023-06-20 VITALS
HEIGHT: 71 IN | RESPIRATION RATE: 16 BRPM | HEART RATE: 72 BPM | DIASTOLIC BLOOD PRESSURE: 80 MMHG | WEIGHT: 258 LBS | TEMPERATURE: 98 F | BODY MASS INDEX: 36.12 KG/M2 | SYSTOLIC BLOOD PRESSURE: 128 MMHG

## 2023-06-20 DIAGNOSIS — M25.571 CHRONIC PAIN OF RIGHT ANKLE: ICD-10-CM

## 2023-06-20 DIAGNOSIS — M25.561 ACUTE PAIN OF RIGHT KNEE: ICD-10-CM

## 2023-06-20 DIAGNOSIS — M79.604 BILATERAL LEG PAIN: ICD-10-CM

## 2023-06-20 DIAGNOSIS — M79.605 BILATERAL LEG PAIN: ICD-10-CM

## 2023-06-20 DIAGNOSIS — G89.29 CHRONIC PAIN OF RIGHT ANKLE: ICD-10-CM

## 2023-06-20 DIAGNOSIS — M79.604 BILATERAL LEG PAIN: Primary | ICD-10-CM

## 2023-06-20 DIAGNOSIS — M79.605 BILATERAL LEG PAIN: Primary | ICD-10-CM

## 2023-06-20 PROCEDURE — 73564 X-RAY EXAM KNEE 4 OR MORE: CPT | Mod: TC,RT

## 2023-06-20 PROCEDURE — 99213 PR OFFICE/OUTPT VISIT, EST, LEVL III, 20-29 MIN: ICD-10-PCS | Mod: S$PBB,,, | Performed by: NURSE PRACTITIONER

## 2023-06-20 PROCEDURE — 72100 X-RAY EXAM L-S SPINE 2/3 VWS: CPT | Mod: TC

## 2023-06-20 PROCEDURE — 99213 OFFICE O/P EST LOW 20 MIN: CPT | Mod: S$PBB,,, | Performed by: NURSE PRACTITIONER

## 2023-06-20 PROCEDURE — 99214 OFFICE O/P EST MOD 30 MIN: CPT | Mod: PBBFAC | Performed by: NURSE PRACTITIONER

## 2023-06-20 PROCEDURE — 73610 X-RAY EXAM OF ANKLE: CPT | Mod: TC,RT

## 2023-06-20 NOTE — ASSESSMENT & PLAN NOTE
Right Ankle X-ray today  Perform back stretches daily.   Avoid activities than increase pain or stiffness.   Apply heating pad, ice pack, and Icy Hot / BioFreeze as needed; alternate every 15-20 minutes.

## 2023-06-20 NOTE — PROGRESS NOTES
SUHAIL Crowder   [facility]  [address] [facility] [address]      PATIENT NAME: Figueroa Murcia  : 1990  DATE: 23  MRN: [id number]      Patient PCP Information       Provider PCP Type    SUHAIL Crowder [facility]            Reason for Visit / Chief Complaint: Follow-up (hypertension)       History of Present Illness / Problem Focused Workflow     Figueroa Murcia presents to the clinic with Follow-up (hypertension)     32 yo AAM here today for f/u. PMH Tobacco use (1/2 ppd) + THC.      2023  Pt c/o of Right Leg x 6 months, recent EV visit, after running feels like his legs are going to give out, after standing for too long pain occurs so he sits down and then after sitting the pain occurs as well, pain is in entire leg, reports sometimes the left leg hurts but not as much. Noted varicose veins to bilateral legs. Agreeable to arterial US order today, will to ye. Pt agreeable to routine wellness labs today, will f/u when available. Denies any other issues or concerns at this time.      2023  Pt here today for f/u for lab review. Discussed low vitamin D and elevated FLP today. The pt is agreeable to starting RX statin for HLD, the pt BP is noted to be elevated on last encounters, will bring hiim back F2F in a few weeks to evaluate his BP. Agreeable to 3 month virtual f/u for lab review for HLD eval. Denies any other issues or concerns at this time.     2023  Pt here today for BP check, BP WNL today. Aware of f/u in 2 months for HLD f/u via virtual with fasting labs to be completed prior to OV.  Pt reports with continued right leg pain > L, he reports he played basketball yesterday and today his right knee is hurting, pain also starts at the right achilles area and radiates up into the rigth knee. Discussed activity and Ice, pt verbalized understanding. Will complete x-ray's today and we will f/u with results. No  "decreased ROM of right achilles or right knee noted upon assessment.   Denies chest pain, shortness of breath, cough, fever, headache, dizziness, weakness, abdominal pain, nausea, vomiting, diarrhea, constipation, black/bloody stools, unplanned weight loss, night sweats, changes in urinary patterns, burning/odor with urination, depression, anxiety, and SI/HI.       Follow-up  Associated symptoms include arthralgias (Right knee pain / right ankle pain).       Review of Systems     Review of Systems   Constitutional: Negative.    HENT: Negative.     Eyes: Negative.    Respiratory: Negative.     Cardiovascular: Negative.    Gastrointestinal: Negative.    Endocrine: Negative.    Genitourinary: Negative.    Musculoskeletal:  Positive for arthralgias (Right knee pain / right ankle pain) and gait problem.   Psychiatric/Behavioral: Negative.         Medications and Allergies     Medications  Current Outpatient Medications   Medication Instructions    atorvastatin (LIPITOR) 10 mg, Oral, Nightly, For High Cholesterol    ergocalciferol (ERGOCALCIFEROL) 50,000 Units, Oral, Every 7 days, For Low Vitamin D. Start over the counter when completed.         Allergies  Review of patient's allergies indicates:  No Known Allergies    Physical Examination     Visit Vitals  /80   Pulse 72   Temp 98.2 °F (36.8 °C)   Resp 16   Ht 5' 11" (1.803 m)   Wt 117 kg (258 lb)   BMI 35.98 kg/m²       Physical Exam  Vitals reviewed.   Constitutional:       Appearance: Normal appearance. He is normal weight.   HENT:      Head: Normocephalic.   Cardiovascular:      Rate and Rhythm: Normal rate and regular rhythm.      Pulses: Normal pulses.      Heart sounds: Normal heart sounds.   Pulmonary:      Effort: Pulmonary effort is normal.      Breath sounds: Normal breath sounds.   Abdominal:      General: Abdomen is flat.      Palpations: Abdomen is soft.   Musculoskeletal:         General: Normal range of motion.      Cervical back: Normal range of " motion.   Skin:     General: Skin is warm and dry.   Neurological:      Mental Status: He is alert.   Psychiatric:         Mood and Affect: Mood normal.         Results     Lab Results   Component Value Date    WBC 6.37 05/18/2023    RBC 5.37 05/18/2023    HGB 15.4 05/18/2023    HCT 46.2 05/18/2023    MCV 86.0 05/18/2023    MCH 28.7 05/18/2023    MCHC 33.3 05/18/2023    RDW 11.9 05/18/2023     05/18/2023    MPV 10.2 05/18/2023     CMP  Sodium Level   Date Value Ref Range Status   05/18/2023 140 136 - 145 mmol/L Final     Potassium Level   Date Value Ref Range Status   05/18/2023 3.5 3.5 - 5.1 mmol/L Final     Carbon Dioxide   Date Value Ref Range Status   05/18/2023 25 22 - 29 mmol/L Final     Blood Urea Nitrogen   Date Value Ref Range Status   05/18/2023 10.3 8.9 - 20.6 mg/dL Final     Creatinine   Date Value Ref Range Status   05/18/2023 1.11 0.73 - 1.18 mg/dL Final     Calcium Level Total   Date Value Ref Range Status   05/18/2023 9.5 8.4 - 10.2 mg/dL Final     Albumin Level   Date Value Ref Range Status   05/18/2023 3.8 3.5 - 5.0 g/dL Final     Bilirubin Total   Date Value Ref Range Status   05/18/2023 1.2 <=1.5 mg/dL Final     Alkaline Phosphatase   Date Value Ref Range Status   05/18/2023 81 40 - 150 unit/L Final     Aspartate Aminotransferase   Date Value Ref Range Status   05/18/2023 59 (H) 5 - 34 unit/L Final     Alanine Aminotransferase   Date Value Ref Range Status   05/18/2023 30 0 - 55 unit/L Final     Estimated GFR-Non    Date Value Ref Range Status   03/29/2022 >60       Lab Results   Component Value Date    CHOL 226 (H) 05/18/2023     Lab Results   Component Value Date    HDL 37 05/18/2023     No results found for: LDLCALC  Lab Results   Component Value Date    TRIG 115 05/18/2023     No results found for: CHOLHDL  Lab Results   Component Value Date    TSH 0.715 05/18/2023         Assessment        ICD-10-CM ICD-9-CM   1. Bilateral leg pain  M79.604 729.5    M79.605    2. Acute  pain of right knee  M25.561 719.46   3. Chronic pain of right ankle  M25.571 719.47    G89.29 338.29        Plan      Problem List Items Addressed This Visit          Orthopedic    Bilateral leg pain - Primary    Current Assessment & Plan     Lumbar Xray today            Relevant Orders    X-Ray Lumbar Spine 2 Or 3 Views    Acute pain of right knee    Current Assessment & Plan     Right Ankle X-ray today  Perform back stretches daily.   Avoid activities than increase pain or stiffness.   Apply heating pad, ice pack, and Icy Hot / BioFreeze as needed; alternate every 15-20 minutes.          Relevant Orders    X-Ray Knee Complete 4 Or More Views Right    Chronic pain of right ankle    Current Assessment & Plan     Right Ankle X-ray today  Perform back stretches daily.   Avoid activities than increase pain or stiffness.   Apply heating pad, ice pack, and Icy Hot / BioFreeze as needed; alternate every 15-20 minutes.            Relevant Orders    X-Ray Ankle Complete Right       Future Appointments   Date Time Provider Department Center   9/1/2023  9:15 AM Aida N Handy, FNP [facility]   5/20/2024  7:30 AM Aida N Handy, FNP [facility]        No follow-ups on file.      Signature:     [facility]  [address] [facility] [address]    Date of encounter: 6/20/23

## 2023-06-21 ENCOUNTER — TELEPHONE (OUTPATIENT)
Dept: INTERNAL MEDICINE | Facility: CLINIC | Age: 33
End: 2023-06-21
Payer: COMMERCIAL

## 2023-06-21 DIAGNOSIS — M77.50 ENTHESOPATHY OF ANKLE: Primary | ICD-10-CM

## 2023-06-21 DIAGNOSIS — M17.11 OSTEOARTHRITIS OF RIGHT KNEE, UNSPECIFIED OSTEOARTHRITIS TYPE: ICD-10-CM

## 2023-06-21 RX ORDER — DICLOFENAC SODIUM 75 MG/1
75 TABLET, DELAYED RELEASE ORAL 2 TIMES DAILY PRN
Qty: 60 TABLET | Refills: 6 | Status: SHIPPED | OUTPATIENT
Start: 2023-06-21 | End: 2023-07-24

## 2023-06-21 NOTE — TELEPHONE ENCOUNTER
Please advise the patient that his right knee xray came back and shows degenerative changes which are related to arthritis. His back x-ray was normal. His right ankle x-ray shows some of the tissue near his heel / achilles is thickened which could've occurred with a previous injury of the area. The treatment includes antiinflammatory medications which I will send today to his pharmacy and sometimes needs further treatment. I will refer him to the Orthopedic clinic here at [facility] for evaluation of the ankle and the knee. They will contact him with an appointment. Please give him the number to that clinic to follow up.     Thanks,    SUHAIL Morrison

## 2023-06-27 NOTE — TELEPHONE ENCOUNTER
I attempted to contact saul, but unable to reach. I left v/m to return call for results provider SUHAIL Huff reviewed . Thanks,

## 2023-06-27 NOTE — TELEPHONE ENCOUNTER
Patient called back and was notified of the results and instructions provided. He verbalized understanding.

## 2023-07-24 ENCOUNTER — HOSPITAL ENCOUNTER (OUTPATIENT)
Dept: RADIOLOGY | Facility: HOSPITAL | Age: 33
Discharge: HOME | End: 2023-07-24
Attending: STUDENT IN AN ORGANIZED HEALTH CARE EDUCATION/TRAINING PROGRAM

## 2023-07-24 ENCOUNTER — OFFICE VISIT (OUTPATIENT)
Dept: ORTHOPEDICS | Facility: CLINIC | Age: 33
End: 2023-07-24

## 2023-07-24 VITALS
BODY MASS INDEX: 36.54 KG/M2 | HEIGHT: 71 IN | WEIGHT: 261 LBS | HEART RATE: 60 BPM | SYSTOLIC BLOOD PRESSURE: 136 MMHG | DIASTOLIC BLOOD PRESSURE: 77 MMHG

## 2023-07-24 DIAGNOSIS — M77.50 ENTHESOPATHY OF ANKLE: ICD-10-CM

## 2023-07-24 DIAGNOSIS — M17.11 OSTEOARTHRITIS OF RIGHT KNEE, UNSPECIFIED OSTEOARTHRITIS TYPE: ICD-10-CM

## 2023-07-24 DIAGNOSIS — M25.571 RIGHT ANKLE PAIN, UNSPECIFIED CHRONICITY: ICD-10-CM

## 2023-07-24 DIAGNOSIS — M76.61 ACHILLES TENDINITIS OF RIGHT LOWER EXTREMITY: Primary | ICD-10-CM

## 2023-07-24 PROBLEM — K35.80 ACUTE APPENDICITIS: Status: RESOLVED | Noted: 2022-10-21 | Resolved: 2023-07-24

## 2023-07-24 PROBLEM — Z00.00 WELLNESS EXAMINATION: Status: RESOLVED | Noted: 2023-05-18 | Resolved: 2023-07-24

## 2023-07-24 PROCEDURE — 99213 OFFICE O/P EST LOW 20 MIN: CPT | Mod: PBBFAC

## 2023-07-24 PROCEDURE — 73610 X-RAY EXAM OF ANKLE: CPT | Mod: TC,RT

## 2023-07-24 RX ORDER — AMOXICILLIN 500 MG/1
500 CAPSULE ORAL 4 TIMES DAILY
COMMUNITY
Start: 2023-07-18 | End: 2023-08-31 | Stop reason: ALTCHOICE

## 2023-07-24 RX ORDER — MELOXICAM 15 MG/1
15 TABLET ORAL DAILY
Qty: 10 TABLET | Refills: 0 | Status: SHIPPED | OUTPATIENT
Start: 2023-07-24 | End: 2023-08-31 | Stop reason: ALTCHOICE

## 2023-07-24 RX ORDER — DICLOFENAC SODIUM 10 MG/G
2 GEL TOPICAL 4 TIMES DAILY PRN
Qty: 100 G | Refills: 3 | Status: SHIPPED | OUTPATIENT
Start: 2023-07-24 | End: 2023-10-22

## 2023-07-24 RX ORDER — HYDROCODONE BITARTRATE AND ACETAMINOPHEN 5; 325 MG/1; MG/1
1 TABLET ORAL
COMMUNITY
Start: 2023-07-18 | End: 2023-08-31 | Stop reason: ALTCHOICE

## 2023-07-24 NOTE — PROGRESS NOTES
"Subjective:   Patient ID: Figueroa Murcia is a 33 y.o. male  who presented to [facility] for new visit.    Chief Complaint: Pain of the Right Ankle    History of Present Illness:    Figueroa Murcia is a 33-year-old male who presents for evaluation of right ankle pain.  This pain has been present for approximately 3 months.  He says it started the morning after playing basketball but does not recall any specific injury while playing.  He localizes the pain to the Achilles tendon insertion.  It has gotten worse since onset.  Is made worse when he ambulates or when he standing for long periods of time.  He was given diclofenac by his PCP which he says only provided minimal improvement.    Ankle/Foot Review of Systems:  Swelling?  yes  Instability?  no  Mechanical sx?  no  <30 min AM stiffness? no  Limited ROM? no  Fever/Chills? no    Objective:   Physical Exam:    /77   Pulse 60   Ht 5' 11" (1.803 m)   Wt 118.4 kg (261 lb)   BMI 36.40 kg/m²     Appearance:  limping  FWB  Soft tissue swelling: Left: no Right: no  Effusion: Left:  Negative Right: Negative  Erythema: Left no Right: no  Ecchymosis: Left: no Right: no  Atrophy: Left: no Right: no    Palpation:  Ankle/Foot Tenderness: Left: non tender Right: achilles insertion.    Range of motion:  Ankle dorsiflexion (20 degrees):     Left: 20 Right: 20  Ankle Plantar flexion (50 degrees): Left 50 Right: 50  Subtalar inversion (5 degrees): Left: 5 Right 5  Subtalar eversion (5 degrees):  Left: 5 Right 5  Transverse/midtarsal: adduction (20 degrees): Left: 20 Right: 20  Transverse/midtarsal abduction (10 degrees): Left: 10 Right: 10    Strength:  Foot inversion/dorsiflexion (Deep Peroneal N. L4):Left: 5/5  Pain: no Right: 5/5  Pain: no  1st toe Dorsiflexion (Deep Peroneal N. L5): Left: 5/5  Pain: no Right: 5/5  Pain: no  Foot plantarflexion (Tibial N. S1): Left: 5/5  Pain: no Right: 5/5  Pain: yes  Foot eversion " (superficial peroneal L4-S1): Left: 5/5  Pain: no Right: 5/5  Pain: no    Special Tests:  Aquino:  Left: Negative   Right: Negative   Anterior drawer: Left: Negative  Right: Negative   Talar tilt: Left: Negative  Right: Negative   External rotation stress (Kleiger's): Left: Negative   Right: Negative  Eversion stress: Left: Negative  Right: Negative   Squeeze: Left: Negative    Right: Negative  Heel rise: Left: Negative  Right: Negative  Too many toes sign: Left: Negative  Right: Negative    General appearance: NAD  Peripheral pulses: normal bilaterally   Sensation: normal    Labs:  Last A1c: 5.0     Imaging:   Previous images reviewed.  X-rays ordered and performed today: yes  # of views: 3 Laterality: right  My Interpretation:  enthesophyte at achilles insertion     Assessment:     Encounter Diagnoses   Code Name Primary?    M25.571 Right ankle pain, unspecified chronicity Yes    M77.50 Enthesopathy of ankle     M17.11 Osteoarthritis of right knee, unspecified osteoarthritis type      Plan:   MDM: Prior external referring provider notes reviewed. Prior external referring provider studies reviewed.   Dx: right achilles enthesopathy    Treatment Plan: Discussed with patient diagnosis, prognosis, and treatment recommendations. Education provided.    At this time, we will place the patient in a walking boot for a short period of time.  Advised on use for approximately 2 weeks and then slowly wean out of it.  During this time, he can come out of it for daily exercises.    Did discuss the importance of physical therapy.  Patient given a prescription for formal PT which can be taken to the physical therapy location of the patient's choice.    Home exercise packet given.  Prescription NSAIDs given.  We also discussed the possibility of using nitroglycerin patches to the Achilles if these NSAIDs do not provide relief.    Discussed the importance of tobacco cessation and its effects on improving inflammation.  Imaging:  radiological studies ordered and independently reviewed; discussed with patient; pending radiologist interpretation.   Weight Management: is paramount. maintain healthy weight of a bmi of 24.9 or less..   Procedure: Discussed the utility of tenotomy and/or CSI.  Patient does not wish to have either procedure done today but will consider in the future if the above conservative measures do not provide relief.  Activity: Activity as tolerated; HEP to include aerobic conditioning and strength training with non-painful activity. ROM/STG exercises. Proper footware; assistive devises to avoid limping.   Therapy: Physical Therapy  Medication: start meloxicam 15 mg daily for a short course; medication precautions given and topical NSAIDs prescribed; medication precautions given. Please see your primary care physician for further refills.  RTC: 6-8 weeks.

## 2023-08-08 ENCOUNTER — LAB VISIT (OUTPATIENT)
Dept: LAB | Facility: HOSPITAL | Age: 33
End: 2023-08-08
Attending: NURSE PRACTITIONER

## 2023-08-08 DIAGNOSIS — E78.2 MIXED HYPERLIPIDEMIA: ICD-10-CM

## 2023-08-08 LAB
ANION GAP SERPL CALC-SCNC: 10 MEQ/L
APPEARANCE UR: CLEAR
BACTERIA #/AREA URNS AUTO: ABNORMAL /HPF
BILIRUB UR QL STRIP.AUTO: NEGATIVE
BUN SERPL-MCNC: 9.7 MG/DL (ref 8.9–20.6)
CALCIUM SERPL-MCNC: 10 MG/DL (ref 8.4–10.2)
CHLORIDE SERPL-SCNC: 106 MMOL/L (ref 98–107)
CHOLEST SERPL-MCNC: 195 MG/DL
CHOLEST/HDLC SERPL: 5 {RATIO} (ref 0–5)
CO2 SERPL-SCNC: 26 MMOL/L (ref 22–29)
COLOR UR: YELLOW
CREAT SERPL-MCNC: 0.9 MG/DL (ref 0.73–1.18)
CREAT/UREA NIT SERPL: 11
GFR SERPLBLD CREATININE-BSD FMLA CKD-EPI: >60 MLS/MIN/1.73/M2
GLUCOSE SERPL-MCNC: 99 MG/DL (ref 74–100)
GLUCOSE UR QL STRIP.AUTO: NORMAL
HDLC SERPL-MCNC: 38 MG/DL (ref 35–60)
HYALINE CASTS #/AREA URNS LPF: ABNORMAL /LPF
KETONES UR QL STRIP.AUTO: NEGATIVE
LDLC SERPL CALC-MCNC: 136 MG/DL (ref 50–140)
LEUKOCYTE ESTERASE UR QL STRIP.AUTO: NEGATIVE
MUCOUS THREADS URNS QL MICRO: ABNORMAL /LPF
NITRITE UR QL STRIP.AUTO: NEGATIVE
PH UR STRIP.AUTO: 6 [PH]
POTASSIUM SERPL-SCNC: 3.7 MMOL/L (ref 3.5–5.1)
PROT UR QL STRIP.AUTO: ABNORMAL
RBC #/AREA URNS AUTO: ABNORMAL /HPF
RBC UR QL AUTO: ABNORMAL
SODIUM SERPL-SCNC: 142 MMOL/L (ref 136–145)
SP GR UR STRIP.AUTO: 1.03
SQUAMOUS #/AREA URNS LPF: ABNORMAL /HPF
TRIGL SERPL-MCNC: 105 MG/DL (ref 34–140)
UROBILINOGEN UR STRIP-ACNC: ABNORMAL
VLDLC SERPL CALC-MCNC: 21 MG/DL
WBC #/AREA URNS AUTO: ABNORMAL /HPF

## 2023-08-08 PROCEDURE — 36415 COLL VENOUS BLD VENIPUNCTURE: CPT

## 2023-08-08 PROCEDURE — 81001 URINALYSIS AUTO W/SCOPE: CPT

## 2023-08-08 PROCEDURE — 80061 LIPID PANEL: CPT

## 2023-08-08 PROCEDURE — 80048 BASIC METABOLIC PNL TOTAL CA: CPT

## 2023-08-31 NOTE — PROGRESS NOTES
SUHAIL Crowder   [facility]  [address] [id number]      PATIENT NAME: Figueroa Murcia  : 1990  DATE: 23  MRN: [id number]      Patient PCP Information       Provider PCP Type    SUHAIL Crowder General            Reason for Visit / Chief Complaint: Health Maintenance (Lab review / achilles and bottom of foot burn will call ortho )       History of Present Illness / Problem Focused Workflow     Figueroa Murcia presents to the clinic with Health Maintenance (Lab review / achilles and bottom of foot burn will call ortho )     34 yo AAM here today for f/u. PMH Tobacco use ( ppd) + THC.         23  Pt here today via virtual for f/u for HLD. Labs completed and reviewed, FLP WNL compared to LOV when statin was started. Pt reports compliance with medications. Will refill meds appropriately. Will f/u in 2024 for yearly wellness and prn. Pt reports with continued pain of his leg / achilles, was seen by [facility] in July, notes for f/u 6-8 weeks, no appt made at this time, pt will call Ortho clinic to make f/u. Denies any other concerns today.   Denies chest pain, shortness of breath, cough, fever, headache, dizziness, weakness, abdominal pain, nausea, vomiting, diarrhea, constipation, black/bloody stools, unplanned weight loss, night sweats, changes in urinary patterns, burning/odor with urination, depression, anxiety, and SI/HI.             Review of Systems     Review of Systems   Constitutional: Negative.  Negative for activity change and unexpected weight change.   HENT: Negative.  Negative for hearing loss, rhinorrhea and trouble swallowing.    Eyes: Negative.  Negative for discharge and visual disturbance.   Respiratory: Negative.  Negative for chest tightness and wheezing.    Cardiovascular: Negative.  Negative for chest pain and palpitations.   Gastrointestinal: Negative.  Negative for blood in stool, constipation,  diarrhea and vomiting.   Endocrine: Negative.  Negative for polydipsia and polyuria.   Genitourinary: Negative.  Negative for difficulty urinating, hematuria and urgency.   Musculoskeletal:  Positive for arthralgias and joint swelling. Negative for neck pain.   Neurological: Negative.  Negative for weakness and headaches.   Psychiatric/Behavioral: Negative.  Negative for confusion and dysphoric mood.          Medications and Allergies     Medications  Current Outpatient Medications   Medication Instructions    atorvastatin (LIPITOR) 10 mg, Oral, Nightly, For High Cholesterol    diclofenac sodium (VOLTAREN) 2 g, Topical (Top), 4 times daily PRN, Do not exceed 32 grams/day: do not to exceed 8 grams/day/single joint of upper extremities; do not to exceed 16 grams/day/single joint of lower extremities.  Please request refill of this medication from your PCP.         Allergies  Review of patient's allergies indicates:  No Known Allergies    Physical Examination     There were no vitals taken for this visit.    Physical Exam  HENT:      Right Ear: Hearing normal.      Left Ear: Hearing normal.   Neurological:      Mental Status: He is alert and oriented to person, place, and time.   Psychiatric:         Mood and Affect: Mood normal.           Results     Lab Results   Component Value Date    WBC 6.37 05/18/2023    RBC 5.37 05/18/2023    HGB 15.4 05/18/2023    HCT 46.2 05/18/2023    MCV 86.0 05/18/2023    MCH 28.7 05/18/2023    MCHC 33.3 05/18/2023    RDW 11.9 05/18/2023     05/18/2023    MPV 10.2 05/18/2023     CMP  Sodium Level   Date Value Ref Range Status   08/08/2023 142 136 - 145 mmol/L Final     Potassium Level   Date Value Ref Range Status   08/08/2023 3.7 3.5 - 5.1 mmol/L Final     Carbon Dioxide   Date Value Ref Range Status   08/08/2023 26 22 - 29 mmol/L Final     Blood Urea Nitrogen   Date Value Ref Range Status   08/08/2023 9.7 8.9 - 20.6 mg/dL Final     Creatinine   Date Value Ref Range Status  "  08/08/2023 0.90 0.73 - 1.18 mg/dL Final     Calcium Level Total   Date Value Ref Range Status   08/08/2023 10.0 8.4 - 10.2 mg/dL Final     Albumin Level   Date Value Ref Range Status   05/18/2023 3.8 3.5 - 5.0 g/dL Final     Bilirubin Total   Date Value Ref Range Status   05/18/2023 1.2 <=1.5 mg/dL Final     Alkaline Phosphatase   Date Value Ref Range Status   05/18/2023 81 40 - 150 unit/L Final     Aspartate Aminotransferase   Date Value Ref Range Status   05/18/2023 59 (H) 5 - 34 unit/L Final     Alanine Aminotransferase   Date Value Ref Range Status   05/18/2023 30 0 - 55 unit/L Final     Estimated GFR-Non    Date Value Ref Range Status   03/29/2022 >60       Lab Results   Component Value Date    CHOL 195 08/08/2023     Lab Results   Component Value Date    HDL 38 08/08/2023     No results found for: "LDLCALC"  Lab Results   Component Value Date    TRIG 105 08/08/2023     No results found for: "CHOLHDL"  Lab Results   Component Value Date    TSH 0.715 05/18/2023         Assessment        ICD-10-CM ICD-9-CM   1. Mixed hyperlipidemia  E78.2 272.2   2. Wellness examination  Z00.00 V70.0   3. Cigarette nicotine dependence without complication  F17.210 305.1        Plan      Problem List Items Addressed This Visit          Cardiac/Vascular    Mixed hyperlipidemia - Primary    Overview     Follow a low cholesterol, low saturated fat diet with less than 200 mg of cholesterol a day.   Avoid fried foods and high saturated fats (rodríguez, sausage, cookies, cakes, chips, cheese, whole milk, butter, mayonnaise, creamy dressings, gravy, stew, gumbo, boudin, cracklins and cream sauces).  Add flax seed or fish oil supplements to diet.   Increase dietary fiber.   Regular exercise improves cholesterol levels.  Physical activity 5 times a week for 30 minutes per day (or 150 minutes per week).   Stressed importance of dietary modifications.           Current Assessment & Plan     FLP WNL  Continue RX atorvastatin 10 " mg q hs       Latest Reference Range & Units 08/08/23 08:54   Cholesterol <=200 mg/dL 195   HDL 35 - 60 mg/dL 38   LDL Cholesterol External 50.00 - 140.00 mg/dL 136.00   Total Cholesterol/HDL Ratio 0 - 5  5   Triglycerides 34 - 140 mg/dL 105   Very Low Density Lipoprotein  21            Relevant Medications    atorvastatin (LIPITOR) 10 MG tablet    Other Relevant Orders    Lipid Panel       Other    Cigarette nicotine dependence without complication (Chronic)    Current Assessment & Plan     Smoking cessation discussed > 3 mins.  Pt not ready to quit.  Discussed benefits of quitting including improved health, decreased cardiac/vascular/pulmonary/stroke risks as well as saving money.            Other Visit Diagnoses       Wellness examination        Relevant Orders    TSH    Hemoglobin A1C    Vitamin D    Comprehensive Metabolic Panel    Urinalysis, Reflex to Urine Culture    Lipid Panel    CBC Auto Differential            Future Appointments   Date Time Provider Department Center   5/20/2024  7:30 AM Aida Jackson FNP [facility]        Follow up in about 9 months (around 5/27/2024) for Wellness .      Signature:     [facility]  [address] [place] [address]    Date of encounter: 9/1/23    The patient location is: home  The chief complaint leading to consultation is: HLD F/U    Visit type: audiovisual    Face to Face time with patient: 5  10 minutes of total time spent on the encounter, which includes face to face time and non-face to face time preparing to see the patient (eg, review of tests), Obtaining and/or reviewing separately obtained history, Documenting clinical information in the electronic or other health record, Independently interpreting results (not separately reported) and communicating results to the patient/family/caregiver, or Care coordination (not separately reported).         Each patient to whom he or she  provides medical services by telemedicine is:  (1) informed of the relationship between the physician and patient and the respective role of any other health care provider with respect to management of the patient; and (2) notified that he or she may decline to receive medical services by telemedicine and may withdraw from such care at any time.    Notes:

## 2023-08-31 NOTE — ASSESSMENT & PLAN NOTE
FLP WNL  Continue RX atorvastatin 10 mg q hs       Latest Reference Range & Units 08/08/23 08:54   Cholesterol <=200 mg/dL 195   HDL 35 - 60 mg/dL 38   LDL Cholesterol External 50.00 - 140.00 mg/dL 136.00   Total Cholesterol/HDL Ratio 0 - 5  5   Triglycerides 34 - 140 mg/dL 105   Very Low Density Lipoprotein  21

## 2023-09-01 ENCOUNTER — OFFICE VISIT (OUTPATIENT)
Dept: INTERNAL MEDICINE | Facility: CLINIC | Age: 33
End: 2023-09-01

## 2023-09-01 DIAGNOSIS — Z00.00 WELLNESS EXAMINATION: ICD-10-CM

## 2023-09-01 DIAGNOSIS — F17.210 CIGARETTE NICOTINE DEPENDENCE WITHOUT COMPLICATION: Chronic | ICD-10-CM

## 2023-09-01 DIAGNOSIS — E78.2 MIXED HYPERLIPIDEMIA: Primary | ICD-10-CM

## 2023-09-01 PROCEDURE — 99214 PR OFFICE/OUTPT VISIT, EST, LEVL IV, 30-39 MIN: ICD-10-PCS | Mod: 25,95,, | Performed by: NURSE PRACTITIONER

## 2023-09-01 PROCEDURE — 99406 PR TOBACCO USE CESSATION INTERMEDIATE 3-10 MINUTES: ICD-10-PCS | Mod: 95,,, | Performed by: NURSE PRACTITIONER

## 2023-09-01 PROCEDURE — 99214 OFFICE O/P EST MOD 30 MIN: CPT | Mod: 25,95,, | Performed by: NURSE PRACTITIONER

## 2023-09-01 PROCEDURE — 99406 BEHAV CHNG SMOKING 3-10 MIN: CPT | Mod: 95,,, | Performed by: NURSE PRACTITIONER

## 2023-09-01 RX ORDER — ATORVASTATIN CALCIUM 10 MG/1
10 TABLET, FILM COATED ORAL NIGHTLY
Qty: 90 TABLET | Refills: 2 | Status: SHIPPED | OUTPATIENT
Start: 2023-09-01 | End: 2024-05-28

## 2023-09-05 ENCOUNTER — HOSPITAL ENCOUNTER (EMERGENCY)
Facility: HOSPITAL | Age: 33
Discharge: HOME | End: 2023-09-05
Attending: STUDENT IN AN ORGANIZED HEALTH CARE EDUCATION/TRAINING PROGRAM

## 2023-09-05 VITALS
SYSTOLIC BLOOD PRESSURE: 161 MMHG | BODY MASS INDEX: 37.46 KG/M2 | RESPIRATION RATE: 16 BRPM | HEART RATE: 60 BPM | OXYGEN SATURATION: 100 % | HEIGHT: 70 IN | DIASTOLIC BLOOD PRESSURE: 91 MMHG | WEIGHT: 261.69 LBS | TEMPERATURE: 98 F

## 2023-09-05 DIAGNOSIS — K08.89 TOOTHACHE: ICD-10-CM

## 2023-09-05 DIAGNOSIS — K04.7 DENTAL INFECTION: ICD-10-CM

## 2023-09-05 DIAGNOSIS — K08.89 PAIN, DENTAL: Primary | ICD-10-CM

## 2023-09-05 PROCEDURE — 99284 EMERGENCY DEPT VISIT MOD MDM: CPT

## 2023-09-05 RX ORDER — PENICILLIN V POTASSIUM 500 MG/1
500 TABLET, FILM COATED ORAL EVERY 6 HOURS
Qty: 28 TABLET | Refills: 0 | Status: SHIPPED | OUTPATIENT
Start: 2023-09-05 | End: 2023-09-12

## 2023-09-05 RX ORDER — TRAMADOL HYDROCHLORIDE 50 MG/1
50 TABLET ORAL EVERY 12 HOURS PRN
Qty: 10 TABLET | Refills: 0 | Status: SHIPPED | OUTPATIENT
Start: 2023-09-05 | End: 2023-09-10

## 2023-09-05 NOTE — ED PROVIDER NOTES
Encounter Date: 9/5/2023       History     Chief Complaint   Patient presents with    Dental Pain     Left upper tooth cracked and painful. Has contacted a dentist.     Patient reports to the ER with complaints of left upper dental pain and swelling after a tooth cracked last week; pt reports he has a pending appt with a dentist    The history is provided by the patient.   Dental Pain  Primary symptoms do not include dental injury, headaches, fever, shortness of breath, sore throat or angioedema.   Additional symptoms include: dental sensitivity to temperature, gum swelling and gum tenderness. Additional symptoms do not include: purulent gums, trismus, trouble swallowing, smell disturbance, drooling and swollen glands.     Review of patient's allergies indicates:  No Known Allergies  Past Medical History:   Diagnosis Date    At risk for alteration in comfort     Obesity, unspecified     Tobacco abuse      Past Surgical History:   Procedure Laterality Date    arm repair Right     LAPAROSCOPIC APPENDECTOMY N/A 10/22/2022    Procedure: APPENDECTOMY, LAPAROSCOPIC;  Surgeon: Vinay Sims Jr., MD;  Location: [facility];  Service: General;  Laterality: N/A;     Family History   Problem Relation Age of Onset    Hypertension Mother     No Known Problems Father      Social History     Tobacco Use    Smoking status: Every Day     Current packs/day: 0.50     Average packs/day: 0.5 packs/day for 16.7 years (8.3 ttl pk-yrs)     Types: Cigarettes     Start date: 2007    Smokeless tobacco: Never   Substance Use Topics    Alcohol use: Yes     Comment: occasionally    Drug use: Yes     Frequency: 7.0 times per week     Types: Marijuana     Review of Systems   Constitutional:  Negative for fever.   HENT:  Positive for dental problem. Negative for drooling, sore throat and trouble swallowing.    Respiratory:  Negative for shortness of breath.    Cardiovascular:  Negative for chest pain.   Gastrointestinal:  Negative for nausea.    Genitourinary:  Negative for dysuria.   Musculoskeletal:  Negative for back pain.   Skin:  Negative for rash.   Neurological:  Negative for weakness and headaches.   Hematological:  Does not bruise/bleed easily.   Psychiatric/Behavioral: Negative.         Physical Exam     Initial Vitals [09/05/23 0946]   BP Pulse Resp Temp SpO2   (!) 161/91 60 16 98.3 °F (36.8 °C) 100 %      MAP       --         Physical Exam    Vitals reviewed.  Constitutional: He appears well-developed and well-nourished.   HENT:   Head: Normocephalic and atraumatic.   Mouth/Throat: Uvula is midline, oropharynx is clear and moist and mucous membranes are normal. No trismus in the jaw. Dental abscesses and dental caries present. No uvula swelling.       Eyes: Conjunctivae and EOM are normal. Pupils are equal, round, and reactive to light.   Neck:   Normal range of motion.  Cardiovascular:  Normal rate, regular rhythm and normal heart sounds.           Pulmonary/Chest: Breath sounds normal. He exhibits no tenderness.   Abdominal: Abdomen is soft. Bowel sounds are normal. He exhibits no distension. There is no abdominal tenderness.   Musculoskeletal:         General: Normal range of motion.      Cervical back: Normal range of motion.     Neurological: He is alert and oriented to person, place, and time. He displays normal reflexes. No cranial nerve deficit or sensory deficit. GCS score is 15. GCS eye subscore is 4. GCS verbal subscore is 5. GCS motor subscore is 6.   Skin: Skin is warm. No pallor.   Psychiatric: He has a normal mood and affect. His behavior is normal. Judgment and thought content normal.         ED Course   Procedures  Labs Reviewed - No data to display       Imaging Results    None          Medications - No data to display  Medical Decision Making    Differential - dental pain vs dental fracture vs dental abscess    Risk  Prescription drug management.  Risk Details:   Given strict ED return precautions. I have spoken with the  patient and/or caregivers. I have explained the patient's condition, diagnoses and treatment plan based on the information available to me at this time. I have answered the patient's and/or caregiver's questions and addressed any concerns. The patient and/or caregivers have as good an understanding of the patient's diagnosis, condition and treatment plan as can be expected at this point. The vital signs have been stable. The patient's condition is stable and appropriate for discharge from the emergency department.      The patient will pursue further outpatient evaluation with the primary care physician or other designated or consulting physician as outlined in the discharge instructions. The patient and/or caregivers are agreeable to this plan of care and follow-up instructions have been explained in detail. The patient and/or caregivers have received these instructions in written format and have expressed an understanding of the discharge instructions. The patient and/or caregivers are aware that any significant change in condition or worsening of symptoms should prompt an immediate return to this or the closest emergency department or a call to 911.           APC / Resident Notes:   I was not physically present during the history, exam or disposition of this patient. I was available at all times for consultation. (Zmora)                        Clinical Impression:   Final diagnoses:  [K08.89] Pain, dental (Primary)  [K08.89] Toothache  [K04.7] Dental infection        ED Disposition Condition    Discharge Stable          ED Prescriptions       Medication Sig Dispense Start Date End Date Auth. Provider    penicillin v potassium (VEETID) 500 MG tablet Take 1 tablet (500 mg total) by mouth every 6 (six) hours. for 7 days 28 tablet 9/5/2023 9/12/2023 Fabrice Mcmullen PA    traMADoL (ULTRAM) 50 mg tablet Take 1 tablet (50 mg total) by mouth every 12 (twelve) hours as needed for Pain. 10 tablet 9/5/2023 9/10/2023  Fabrice Mcmullen PA          Follow-up Information       Follow up With Specialties Details Why Contact Info    discharge followup    If your symptoms become WORSE or you DO NOT IMPROVE and you are unable to reach your health care provider, you should RETURN to the emergency department    discharge info    Discussed all pertinent ED information, results, diagnosis and treatment plan; All questions and concerns were addressed at this time. Patient voices understanding of information and instructions. Patient is comfortable with plan and discharge    Aida Jackson FNP Nurse Practitioner   [facility] [place] [id number] [phone]      dentist  Schedule an appointment as soon as possible for a visit in 1 day As needed call to followup with a dentist as soon as possible for further evaluation             Fabrice Mcmullen PA  09/05/23 1024       Heriberto Montes MD  09/05/23 2021

## 2023-09-05 NOTE — Clinical Note
"Figueroa"Wesley Murcia was seen and treated in our emergency department on 9/5/2023.  He may return to work on 09/06/2023.       If you have any questions or concerns, please don't hesitate to call.      Fabrice Mcmullen PA"

## 2023-10-03 ENCOUNTER — OFFICE VISIT (OUTPATIENT)
Dept: ORTHOPEDICS | Facility: CLINIC | Age: 33
End: 2023-10-03

## 2023-10-03 VITALS
DIASTOLIC BLOOD PRESSURE: 78 MMHG | HEIGHT: 70 IN | WEIGHT: 254.63 LBS | SYSTOLIC BLOOD PRESSURE: 133 MMHG | HEART RATE: 70 BPM | BODY MASS INDEX: 36.45 KG/M2

## 2023-10-03 DIAGNOSIS — M76.61 ACHILLES TENDINITIS OF RIGHT LOWER EXTREMITY: Primary | ICD-10-CM

## 2023-10-03 DIAGNOSIS — M77.50 ENTHESOPATHY OF ANKLE: ICD-10-CM

## 2023-10-03 PROCEDURE — 99213 OFFICE O/P EST LOW 20 MIN: CPT | Mod: PBBFAC

## 2023-10-03 RX ORDER — NITROGLYCERIN 20 MG/1
1 PATCH TRANSDERMAL DAILY
Qty: 30 PATCH | Refills: 0 | Status: SHIPPED | OUTPATIENT
Start: 2023-10-03

## 2023-10-03 NOTE — PROGRESS NOTES
"Subjective:    Patient ID: Figueroa Murcia is a 33 y.o. male  who presented to [facility] for follow up.      Chief Complaint: Pain of the Right Ankle      History of Present Illness:  Figueroa Murcia history of right achilles enthesopathy presents to the clinic complaining of worsening left achilles tendon pain onset 6 months ago. He states that the injury happened the morning after playing basketball but does not recall any specific injury while playing basketball. Pain is described as a 7/10, burning, along the insertion of the left achilles tendon, worsen worsens with walking, standing up from a sitting position, and going up/down the stairs, and improves with rest. After his last visit on 7/24/2023, he continued to walk with the walking boot for about a month, and weaned off of it. He denies any swelling or instability of his left foot. He has been doing home exercises and tried taking diclofenac with no improvement. He is currently taking norco with improvement of his pain. He has not done PT for his right achilles pain. Occupation: [employer].       Objective:      Physical Exam:    /78   Pulse 70   Ht 5' 10" (1.778 m)   Wt 115.5 kg (254 lb 9.6 oz)   BMI 36.53 kg/m²     Ortho/SPM Exam    Appearance:  Normal gait/station  FWB      Soft tissue swelling: Left: no Right: no  Effusion: Left:  Negative Right: Negative  Erythema: Left no Right: no  Ecchymosis: Left: no Right: no  Atrophy: Left: no Right: no    Palpation:  Ankle/Foot Tenderness: Left:none Right: achilles tendon and achilles insertion.    Range of motion:  Ankle dorsiflexion (20 degrees):     Left: 20 Right: 40  Ankle Plantar flexion (50 degrees): Left 55 Right: 50  Subtalar inversion (5 degrees): Left: 5 Right 5  Subtalar eversion (5 degrees):  Left: 5 Right 5  Transverse/midtarsal: adduction (20 degrees): Left: 20 Right: 20  Transverse/midtarsal abduction (10 degrees): Left: 10 Right: " 10    Strength:  Foot inversion/dorsiflexion (Deep Peroneal N. L4):Left: 5/5  Pain: no Right: 5/5  Pain: no  1st toe Dorsiflexion (Deep Peroneal N. L5): Left: 5/5  Pain: no Right: 5/5  Pain: no  Foot plantarflexion (Tibial N. S1): Left: 5/5  Pain: no Right: 5/5  Pain: no  Foot eversion (superficial peroneal L4-S1): Left: 5/5  Pain: no Right: 5/5  Pain: no      Special Tests:  Aquino:  Left: Not performed     Right: Not performed   Anterior drawer: Left: Negative     Right: Negative   External rotation stress (Kleiger's): Left: Negative  Right: Negative  Squeeze: Left: Negative  Right: Negative  Heel rise: Left: Not performed Right: Not performed  Too many toes sign: Left: Not performed Right: Not performed      General appearance: NAD  Peripheral pulses: normal bilaterally   Reflexes: Left: normal Right normal   Sensation: normal    Labs:  Last A1c: 5.0     Imaging:   Previous images reviewed.  X-rays ordered and performed today: no  My Interpretation:  enthesophyte at the achilles insertion, no fracture or dislcoation noted     Assessment:        Encounter Diagnoses   Code Name Primary?    M76.61 Achilles tendinitis of right lower extremity Yes    M77.50 Enthesopathy of ankle         Plan:         No orders of the defined types were placed in this encounter.    Medications Ordered This Encounter   Medications    nitroGLYCERIN 0.1 mg/hr TD PT24 (NITRODUR) 0.1 mg/hr PT24     Sig: Place 1 patch onto the skin once daily. apply patch to the most painful affected area for 24 hours/day; if symptoms interfere with sleep - removed before bedtime     Dispense:  30 patch     Refill:  0       Dx: right . achilles enthesopathy  , Achilles tendonitis, subsequent encounter, worsening  Patient initially stated that his maximum tenderness was about 2-3 cm proximal from the Achilles insertion.  However after performing my physical exam he then stated that the maximum tenderness was at the insertion point of the Achilles tendon.   Patient was advised to follow up with his primary care doctor to rule out any inflammatory arthropathy etiology.  Patient candidate for physical therapy.  He was advised to continue performing home exercises.  Nitro patches were prescribed.  Was advised to wear orthotics.  And follow up as needed.  Treatment Plan: Discussed with patient diagnosis, prognosis, and treatment recommendations. Education provided.  Home physical therapy exercise handouts provided to patient.   Nitro patches ordered   Patient advised to wear orthotics   Follow up as needed  Imaging: prior radiological studies independently reviewed; discussed with patient; agree with radiologist interpretation.   Weight Management: is paramount. Recommend to discuss with PCP about medication and bariatric surgery options for weight loss if your BMI is >35 and applicable. A BMI of <24.9 may provide further relief..   Procedure:  patient not a candidate for CSI or VS.  Activity: Activity as tolerated; HEP to include aerobic conditioning and strength training with non-painful activity. ROM/STG exercises. Proper footware; assistive devises to avoid limping.   Therapy: No formal therapy  Medication: START over-the-counter acetaminophen (Tylenol 1000 mg three times per day as needed)  START nitro patches . Please see your primary care physician for further refills.  RTC: PRN; call if any issues.        Todd Zambrano D.O.  Sports Medicine Fellow

## 2023-10-04 NOTE — PROGRESS NOTES
Faculty Attestation: Figueroa Murcia  was seen in [facility] Medicine Clinic. Discussed with Dr. Zambrano at the time of the visit. History of Present Illness, Physical Exam, and Assessment and Plan reviewed. Treatment plan is reasonable and appropriate. Compliance with treatment recommendations is important.  Radiology images independently reviewed and agree with radiologist interpretation. No procedure was performed.     Latoya Riggins MD  Sports Medicine

## 2023-10-05 ENCOUNTER — HOSPITAL ENCOUNTER (EMERGENCY)
Facility: HOSPITAL | Age: 33
Discharge: HOME | End: 2023-10-06
Attending: FAMILY MEDICINE
Payer: MEDICAID

## 2023-10-05 DIAGNOSIS — S01.112A EYEBROW LACERATION, LEFT, INITIAL ENCOUNTER: Primary | ICD-10-CM

## 2023-10-05 PROCEDURE — 90471 IMMUNIZATION ADMIN: CPT | Performed by: PHYSICIAN ASSISTANT

## 2023-10-05 PROCEDURE — 63600175 PHARM REV CODE 636 W HCPCS: Performed by: PHYSICIAN ASSISTANT

## 2023-10-05 PROCEDURE — 99285 EMERGENCY DEPT VISIT HI MDM: CPT | Mod: 25

## 2023-10-05 PROCEDURE — 90715 TDAP VACCINE 7 YRS/> IM: CPT | Performed by: PHYSICIAN ASSISTANT

## 2023-10-05 RX ADMIN — TETANUS TOXOID, REDUCED DIPHTHERIA TOXOID AND ACELLULAR PERTUSSIS VACCINE, ADSORBED 0.5 ML: 5; 2.5; 8; 8; 2.5 SUSPENSION INTRAMUSCULAR at 10:10

## 2023-10-05 NOTE — Clinical Note
"Figueroa Downeylucas Murcia was seen and treated in our emergency department on 10/5/2023.  He may return to work on 10/07/2023.       If you have any questions or concerns, please don't hesitate to call.      Abdirahman COTA    "

## 2023-10-06 VITALS
TEMPERATURE: 98 F | OXYGEN SATURATION: 98 % | WEIGHT: 255.75 LBS | SYSTOLIC BLOOD PRESSURE: 134 MMHG | RESPIRATION RATE: 18 BRPM | HEIGHT: 71 IN | HEART RATE: 67 BPM | DIASTOLIC BLOOD PRESSURE: 89 MMHG | BODY MASS INDEX: 35.81 KG/M2

## 2023-10-06 PROCEDURE — 12051 INTMD RPR FACE/MM 2.5 CM/<: CPT

## 2023-10-06 PROCEDURE — 25000003 PHARM REV CODE 250: Performed by: PHYSICIAN ASSISTANT

## 2023-10-06 RX ORDER — IBUPROFEN 800 MG/1
800 TABLET ORAL EVERY 8 HOURS PRN
Qty: 20 TABLET | Refills: 0 | Status: SHIPPED | OUTPATIENT
Start: 2023-10-06

## 2023-10-06 RX ORDER — LIDOCAINE HYDROCHLORIDE 10 MG/ML
5 INJECTION, SOLUTION EPIDURAL; INFILTRATION; INTRACAUDAL; PERINEURAL
Status: COMPLETED | OUTPATIENT
Start: 2023-10-06 | End: 2023-10-06

## 2023-10-06 RX ADMIN — LIDOCAINE HYDROCHLORIDE 50 MG: 10 INJECTION, SOLUTION EPIDURAL; INFILTRATION; INTRACAUDAL; PERINEURAL at 01:10

## 2023-10-06 NOTE — DISCHARGE INSTRUCTIONS
Take all medications as prescribed.     Keep wound clean and dry.    Return in 7 days for suture removal.    Drink plenty of fluids and get a lot of rest.     Return to ER for any changes or worsening of symptoms.
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
## 2023-10-09 ENCOUNTER — PATIENT MESSAGE (OUTPATIENT)
Dept: INTERNAL MEDICINE | Facility: CLINIC | Age: 33
End: 2023-10-09
Payer: COMMERCIAL

## 2023-10-12 ENCOUNTER — OFFICE VISIT (OUTPATIENT)
Dept: INTERNAL MEDICINE | Facility: CLINIC | Age: 33
End: 2023-10-12

## 2023-10-12 ENCOUNTER — HOSPITAL ENCOUNTER (EMERGENCY)
Facility: HOSPITAL | Age: 33
Discharge: HOME | End: 2023-10-12
Attending: STUDENT IN AN ORGANIZED HEALTH CARE EDUCATION/TRAINING PROGRAM
Payer: MEDICAID

## 2023-10-12 VITALS
DIASTOLIC BLOOD PRESSURE: 74 MMHG | TEMPERATURE: 98 F | HEART RATE: 67 BPM | HEIGHT: 71 IN | BODY MASS INDEX: 35.98 KG/M2 | WEIGHT: 257 LBS | SYSTOLIC BLOOD PRESSURE: 139 MMHG | RESPIRATION RATE: 16 BRPM

## 2023-10-12 VITALS
HEIGHT: 71 IN | BODY MASS INDEX: 35.89 KG/M2 | TEMPERATURE: 99 F | WEIGHT: 256.38 LBS | SYSTOLIC BLOOD PRESSURE: 132 MMHG | OXYGEN SATURATION: 100 % | HEART RATE: 76 BPM | RESPIRATION RATE: 16 BRPM | DIASTOLIC BLOOD PRESSURE: 78 MMHG

## 2023-10-12 DIAGNOSIS — Z48.02 VISIT FOR SUTURE REMOVAL: ICD-10-CM

## 2023-10-12 DIAGNOSIS — L02.01 FACIAL ABSCESS: Primary | ICD-10-CM

## 2023-10-12 DIAGNOSIS — Z23 IMMUNIZATION DUE: ICD-10-CM

## 2023-10-12 DIAGNOSIS — T81.41XA INFECTION INVOLVING SUTURE WITH ABSCESS: Primary | ICD-10-CM

## 2023-10-12 PROCEDURE — 99213 OFFICE O/P EST LOW 20 MIN: CPT | Mod: PBBFAC,25 | Performed by: NURSE PRACTITIONER

## 2023-10-12 PROCEDURE — 99215 PR OFFICE/OUTPT VISIT, EST, LEVL V, 40-54 MIN: ICD-10-PCS | Mod: S$PBB,,, | Performed by: NURSE PRACTITIONER

## 2023-10-12 PROCEDURE — 99283 EMERGENCY DEPT VISIT LOW MDM: CPT | Mod: 27,25

## 2023-10-12 PROCEDURE — 99215 OFFICE O/P EST HI 40 MIN: CPT | Mod: S$PBB,,, | Performed by: NURSE PRACTITIONER

## 2023-10-12 PROCEDURE — 90686 IIV4 VACC NO PRSV 0.5 ML IM: CPT | Mod: PBBFAC

## 2023-10-12 PROCEDURE — 10060 I&D ABSCESS SIMPLE/SINGLE: CPT

## 2023-10-12 PROCEDURE — 25000003 PHARM REV CODE 250: Performed by: PHYSICIAN ASSISTANT

## 2023-10-12 RX ORDER — LIDOCAINE HYDROCHLORIDE 10 MG/ML
5 INJECTION, SOLUTION EPIDURAL; INFILTRATION; INTRACAUDAL; PERINEURAL
Status: COMPLETED | OUTPATIENT
Start: 2023-10-12 | End: 2023-10-12

## 2023-10-12 RX ORDER — SULFAMETHOXAZOLE AND TRIMETHOPRIM 800; 160 MG/1; MG/1
1 TABLET ORAL 2 TIMES DAILY
Qty: 14 TABLET | Refills: 0 | Status: SHIPPED | OUTPATIENT
Start: 2023-10-12 | End: 2023-10-19

## 2023-10-12 RX ADMIN — LIDOCAINE HYDROCHLORIDE 50 MG: 10 INJECTION, SOLUTION EPIDURAL; INFILTRATION; INTRACAUDAL; PERINEURAL at 02:10
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
## 2023-10-12 NOTE — Clinical Note
"Figueroa"Welsey Murcia was seen and treated in our emergency department on 10/12/2023.  He may return to work on 10/13/2023.       If you have any questions or concerns, please don't hesitate to call.      Tamiko Dominique PA"

## 2023-10-12 NOTE — DISCHARGE INSTRUCTIONS
Packing needs to be removed in 2-3 days.   Take antibiotics as prescribed.   Return with any concerning symptoms.   Follow up with your primary care provider within 2-3 days.

## 2023-10-12 NOTE — PROGRESS NOTES
SUHAIL Crowder   [facility]  [address] [facility] [id number]      PATIENT NAME: Figueroa Murcia  : 1990  DATE: 10/12/23  MRN: [id number]      Patient PCP Information       Provider PCP Type    SUHAIL Crowder [facility]            Reason for Visit / Chief Complaint: Follow-up (ED visit ( eye laceration stitches removal )  and ortho visit ( arthropathy etiology ))       History of Present Illness / Problem Focused Workflow     Figueroa Murcia presents to the clinic with Follow-up (ED visit ( eye laceration stitches removal )  and ortho visit ( arthropathy etiology ))     34 yo AAM here today for f/u. PMH Tobacco use (1/2 ppd) + THC.      10/12/2023  Pt here today for ED f/u for suture removal from eye laceration. Upon eval, noted area to be swollen, very tender to touch, and hot. The pt reports that he started having an excessive amount of yellow drainage from the area yesterday evening / last night. Reports when he leans his face over it is painful and it throbs. No current drainage noted at this time but dried fluid present. Pt is requesting removal of sutures in clinic today, discussed with pt concern regarding potential abscess and infection of the area due to the PE, discussed with pt potentially needing suture removal with I&D and AB therapy, the pt reports he has to get back to work, he will f/u with the ED / [facility] this evening, advised pt that he should be seen at this time but reports he has to get back to finish up his work. Discussed with pt importance of treatment to the area due to proximity to his eye and on his face, pt verbalized understanding. We will f/u with pt tomorrow and establish a follow up visit with me.   Denies chest pain, shortness of breath, cough, fever, headache, dizziness, weakness, abdominal pain, nausea, vomiting, diarrhea, constipation, black/bloody stools, unplanned weight loss, night sweats, changes in urinary  "patterns, burning/odor with urination, depression, anxiety, and SI/HI.           Review of Systems     Review of Systems   Constitutional: Negative.    HENT: Negative.     Eyes: Negative.    Respiratory: Negative.     Cardiovascular: Negative.    Gastrointestinal: Negative.    Endocrine: Negative.    Genitourinary: Negative.    Skin:  Positive for wound.   Neurological: Negative.    Psychiatric/Behavioral: Negative.           Medications and Allergies     Medications  Current Outpatient Medications   Medication Instructions    atorvastatin (LIPITOR) 10 mg, Oral, Nightly, For High Cholesterol    diclofenac sodium (VOLTAREN) 2 g, Topical (Top), 4 times daily PRN, Do not exceed 32 grams/day: do not to exceed 8 grams/day/single joint of upper extremities; do not to exceed 16 grams/day/single joint of lower extremities.  Please request refill of this medication from your PCP.    HYDROcodone-acetaminophen (NORCO) 5-325 mg per tablet 1 tablet, Oral    ibuprofen (ADVIL,MOTRIN) 800 mg, Oral, Every 8 hours PRN    nitroGLYCERIN 0.1 mg/hr TD PT24 (NITRODUR) 0.1 mg/hr PT24 1 patch, Transdermal, Daily, apply patch to the most painful affected area for 24 hours/day; if symptoms interfere with sleep - removed before bedtime         Allergies  Review of patient's allergies indicates:  No Known Allergies    Physical Examination     Visit Vitals  /74   Pulse 67   Temp 97.8 °F (36.6 °C)   Resp 16   Ht 5' 11" (1.803 m)   Wt 116.6 kg (257 lb)   BMI 35.84 kg/m²       Physical Exam  Vitals reviewed.   Constitutional:       Appearance: Normal appearance. He is normal weight.   HENT:      Head: Normocephalic.     Cardiovascular:      Rate and Rhythm: Normal rate and regular rhythm.      Pulses: Normal pulses.      Heart sounds: Normal heart sounds.   Pulmonary:      Effort: Pulmonary effort is normal.      Breath sounds: Normal breath sounds.   Abdominal:      General: Abdomen is flat.      Palpations: Abdomen is soft. " "  Musculoskeletal:         General: Normal range of motion.      Cervical back: Normal range of motion.   Skin:     General: Skin is warm and dry.   Neurological:      Mental Status: He is alert.   Psychiatric:         Mood and Affect: Mood normal.           Results     Lab Results   Component Value Date    WBC 6.37 05/18/2023    RBC 5.37 05/18/2023    HGB 15.4 05/18/2023    HCT 46.2 05/18/2023    MCV 86.0 05/18/2023    MCH 28.7 05/18/2023    MCHC 33.3 05/18/2023    RDW 11.9 05/18/2023     05/18/2023    MPV 10.2 05/18/2023     CMP  Sodium Level   Date Value Ref Range Status   08/08/2023 142 136 - 145 mmol/L Final     Potassium Level   Date Value Ref Range Status   08/08/2023 3.7 3.5 - 5.1 mmol/L Final     Carbon Dioxide   Date Value Ref Range Status   08/08/2023 26 22 - 29 mmol/L Final     Blood Urea Nitrogen   Date Value Ref Range Status   08/08/2023 9.7 8.9 - 20.6 mg/dL Final     Creatinine   Date Value Ref Range Status   08/08/2023 0.90 0.73 - 1.18 mg/dL Final     Calcium Level Total   Date Value Ref Range Status   08/08/2023 10.0 8.4 - 10.2 mg/dL Final     Albumin Level   Date Value Ref Range Status   05/18/2023 3.8 3.5 - 5.0 g/dL Final     Bilirubin Total   Date Value Ref Range Status   05/18/2023 1.2 <=1.5 mg/dL Final     Alkaline Phosphatase   Date Value Ref Range Status   05/18/2023 81 40 - 150 unit/L Final     Aspartate Aminotransferase   Date Value Ref Range Status   05/18/2023 59 (H) 5 - 34 unit/L Final     Alanine Aminotransferase   Date Value Ref Range Status   05/18/2023 30 0 - 55 unit/L Final     Estimated GFR-Non    Date Value Ref Range Status   03/29/2022 >60       Lab Results   Component Value Date    CHOL 195 08/08/2023     Lab Results   Component Value Date    HDL 38 08/08/2023     No results found for: "LDLCALC"  Lab Results   Component Value Date    TRIG 105 08/08/2023     No results found for: "CHOLHDL"  Lab Results   Component Value Date    TSH 0.715 05/18/2023 "         Assessment        ICD-10-CM ICD-9-CM   1. Infection involving suture with abscess  T81.41XA 998.59   2. Immunization due  Z23 V05.9        Plan      Problem List Items Addressed This Visit          ID    Infection involving suture with abscess - Primary    Current Assessment & Plan     ED Referral           Other Visit Diagnoses       Immunization due        Relevant Orders    Influenza - Quadrivalent *Preferred* (6 months+) (PF) (Completed)            Future Appointments   Date Time Provider Department Center   6/4/2024  9:30 AM Aida Jackson, [facility]        No follow-ups on file.      Signature:     [facility]  [address] [id number]    Date of encounter: 10/12/23

## 2023-10-12 NOTE — Clinical Note
"Figueroa"Wesley Murcia was seen and treated in our emergency department on 10/12/2023.  He may return to work on 10/14/2023.       If you have any questions or concerns, please don't hesitate to call.      Tamiko Dominique PA"

## 2023-10-26 ENCOUNTER — OFFICE VISIT (OUTPATIENT)
Dept: INTERNAL MEDICINE | Facility: CLINIC | Age: 33
End: 2023-10-26

## 2023-10-26 VITALS
HEIGHT: 71 IN | RESPIRATION RATE: 16 BRPM | WEIGHT: 254 LBS | TEMPERATURE: 98 F | DIASTOLIC BLOOD PRESSURE: 73 MMHG | BODY MASS INDEX: 35.56 KG/M2 | SYSTOLIC BLOOD PRESSURE: 129 MMHG | HEART RATE: 66 BPM

## 2023-10-26 DIAGNOSIS — J06.9 UPPER RESPIRATORY TRACT INFECTION, UNSPECIFIED TYPE: Primary | ICD-10-CM

## 2023-10-26 PROCEDURE — 99214 PR OFFICE/OUTPT VISIT, EST, LEVL IV, 30-39 MIN: ICD-10-PCS | Mod: S$PBB,,, | Performed by: NURSE PRACTITIONER

## 2023-10-26 PROCEDURE — 99214 OFFICE O/P EST MOD 30 MIN: CPT | Mod: PBBFAC | Performed by: NURSE PRACTITIONER

## 2023-10-26 PROCEDURE — 99214 OFFICE O/P EST MOD 30 MIN: CPT | Mod: S$PBB,,, | Performed by: NURSE PRACTITIONER

## 2023-10-26 RX ORDER — PREDNISONE 20 MG/1
20 TABLET ORAL DAILY
Qty: 5 TABLET | Refills: 0 | Status: SHIPPED | OUTPATIENT
Start: 2023-10-26 | End: 2023-10-31

## 2023-10-26 RX ORDER — LORATADINE 10 MG/1
10 TABLET ORAL DAILY
Qty: 30 TABLET | Refills: 0 | Status: SHIPPED | OUTPATIENT
Start: 2023-10-26 | End: 2023-11-25

## 2023-10-26 RX ORDER — PROMETHAZINE HYDROCHLORIDE AND DEXTROMETHORPHAN HYDROBROMIDE 6.25; 15 MG/5ML; MG/5ML
5 SYRUP ORAL EVERY 4 HOURS PRN
Qty: 120 ML | Refills: 0 | Status: SHIPPED | OUTPATIENT
Start: 2023-10-26 | End: 2023-11-05

## 2023-10-26 NOTE — ASSESSMENT & PLAN NOTE
Start RX loratadine 10 mg daily, prednisone 10 mg daily x 5 days, and promethazine dm cough syrup prn  F/U UCC if no improvement in symptoms

## 2023-10-26 NOTE — PROGRESS NOTES
SUHAIL Crowder   [facility]  [address][1] [id number]      PATIENT NAME: Figueroa Murcia  : 1990  DATE: 10/26/23  MRN: [id number]      Patient PCP Information       Provider PCP Type    SUHAIL Crowder General            Reason for Visit / Chief Complaint: Follow-up (ED facial abcess I&D)       History of Present Illness / Problem Focused Workflow     Figueroa Murcia presents to the clinic with Follow-up (ED facial abcess I&D)     32 yo AAM here today for f/u. PMH Tobacco use (1/2 ppd) + THC.      10/12/2023  Pt here today for ED f/u for suture removal from eye laceration. Upon eval, noted area to be swollen, very tender to touch, and hot. The pt reports that he started having an excessive amount of yellow drainage from the area yesterday evening / last night. Reports when he leans his face over it is painful and it throbs. No current drainage noted at this time but dried fluid present. Pt is requesting removal of sutures in clinic today, discussed with pt concern regarding potential abscess and infection of the area due to the PE, discussed with pt potentially needing suture removal with I&D and AB therapy, the pt reports he has to get back to work, he will f/u with the ED / UCC this evening, advised pt that he should be seen at this time but reports he has to get back to finish up his work. Discussed with pt importance of treatment to the area due to proximity to his eye and on his face, pt verbalized understanding. We will f/u with pt tomorrow and establish a follow up visit with me.     10/26/2023  Pt here today for ED f/u from suture removal and abscess drainage. Area much improved since LOV, pt reports just some mild tenderness to the area at times. Reports today however with AM/PM congestion and cough for a couple of days, OTC meds without relief, denies any fever or exposure to viruses that he is aware of; will send  "medications for symptomatic relief, will f/u prn. NOV already scheduled.   Denies chest pain, shortness of breath, cough, fever, headache, dizziness, weakness, abdominal pain, nausea, vomiting, diarrhea, constipation, black/bloody stools, unplanned weight loss, night sweats, changes in urinary patterns, burning/odor with urination, depression, anxiety, and SI/HI.               Review of Systems     Review of Systems   Constitutional: Negative.    HENT:  Positive for congestion.    Eyes: Negative.    Respiratory:  Positive for cough.    Cardiovascular: Negative.    Gastrointestinal: Negative.    Endocrine: Negative.    Genitourinary: Negative.    Neurological: Negative.    Psychiatric/Behavioral: Negative.           Medications and Allergies     Medications  Current Outpatient Medications   Medication Instructions    atorvastatin (LIPITOR) 10 mg, Oral, Nightly, For High Cholesterol    diclofenac sodium (VOLTAREN) 2 g, Topical (Top), 4 times daily PRN, Do not exceed 32 grams/day: do not to exceed 8 grams/day/single joint of upper extremities; do not to exceed 16 grams/day/single joint of lower extremities.  Please request refill of this medication from your PCP.    HYDROcodone-acetaminophen (NORCO) 5-325 mg per tablet 1 tablet, Oral    ibuprofen (ADVIL,MOTRIN) 800 mg, Oral, Every 8 hours PRN    loratadine (CLARITIN) 10 mg, Oral, Daily, For Allergies    nitroGLYCERIN 0.1 mg/hr TD PT24 (NITRODUR) 0.1 mg/hr PT24 1 patch, Transdermal, Daily, apply patch to the most painful affected area for 24 hours/day; if symptoms interfere with sleep - removed before bedtime    predniSONE (DELTASONE) 20 mg, Oral, Daily    promethazine-dextromethorphan (PROMETHAZINE-DM) 6.25-15 mg/5 mL Syrp 5 mLs, Oral, Every 4 hours PRN         Allergies  Review of patient's allergies indicates:  No Known Allergies    Physical Examination     Visit Vitals  /73   Pulse 66   Temp 97.8 °F (36.6 °C)   Resp 16   Ht 5' 11" (1.803 m)   Wt 115.2 kg (254 " lb)   BMI 35.43 kg/m²       Physical Exam  Vitals reviewed.   Constitutional:       Appearance: Normal appearance. He is normal weight.   HENT:      Head: Normocephalic.   Cardiovascular:      Rate and Rhythm: Normal rate and regular rhythm.      Pulses: Normal pulses.      Heart sounds: Normal heart sounds.   Pulmonary:      Effort: Pulmonary effort is normal.      Breath sounds: Normal breath sounds.   Abdominal:      General: Abdomen is flat.      Palpations: Abdomen is soft.   Musculoskeletal:         General: Normal range of motion.      Cervical back: Normal range of motion.   Skin:     General: Skin is warm and dry.   Neurological:      Mental Status: He is alert.   Psychiatric:         Mood and Affect: Mood normal.           Results     Lab Results   Component Value Date    WBC 6.37 05/18/2023    RBC 5.37 05/18/2023    HGB 15.4 05/18/2023    HCT 46.2 05/18/2023    MCV 86.0 05/18/2023    MCH 28.7 05/18/2023    MCHC 33.3 05/18/2023    RDW 11.9 05/18/2023     05/18/2023    MPV 10.2 05/18/2023     CMP  Sodium Level   Date Value Ref Range Status   08/08/2023 142 136 - 145 mmol/L Final     Potassium Level   Date Value Ref Range Status   08/08/2023 3.7 3.5 - 5.1 mmol/L Final     Carbon Dioxide   Date Value Ref Range Status   08/08/2023 26 22 - 29 mmol/L Final     Blood Urea Nitrogen   Date Value Ref Range Status   08/08/2023 9.7 8.9 - 20.6 mg/dL Final     Creatinine   Date Value Ref Range Status   08/08/2023 0.90 0.73 - 1.18 mg/dL Final     Calcium Level Total   Date Value Ref Range Status   08/08/2023 10.0 8.4 - 10.2 mg/dL Final     Albumin Level   Date Value Ref Range Status   05/18/2023 3.8 3.5 - 5.0 g/dL Final     Bilirubin Total   Date Value Ref Range Status   05/18/2023 1.2 <=1.5 mg/dL Final     Alkaline Phosphatase   Date Value Ref Range Status   05/18/2023 81 40 - 150 unit/L Final     Aspartate Aminotransferase   Date Value Ref Range Status   05/18/2023 59 (H) 5 - 34 unit/L Final     Alanine  "Aminotransferase   Date Value Ref Range Status   05/18/2023 30 0 - 55 unit/L Final     Estimated GFR-Non    Date Value Ref Range Status   03/29/2022 >60       Lab Results   Component Value Date    CHOL 195 08/08/2023     Lab Results   Component Value Date    HDL 38 08/08/2023     No results found for: "LDLCALC"  Lab Results   Component Value Date    TRIG 105 08/08/2023     No results found for: "CHOLHDL"  Lab Results   Component Value Date    TSH 0.715 05/18/2023         Assessment        ICD-10-CM ICD-9-CM   1. Upper respiratory tract infection, unspecified type  J06.9 465.9        Plan      Problem List Items Addressed This Visit          ENT    Upper respiratory tract infection - Primary    Overview     OTC antihistamines as needed (i.e. Benadryl)  Increase PO Fluids  Avoid/limit triggers such as pollen, dust, molds, and pet dander.   Avoid smoke, strong chemicals, cleaning products, perfumes/colognes.           Current Assessment & Plan     Start RX loratadine 10 mg daily, prednisone 10 mg daily x 5 days, and promethazine dm cough syrup prn  F/U UCC if no improvement in symptoms          Relevant Medications    predniSONE (DELTASONE) 20 MG tablet    loratadine (CLARITIN) 10 mg tablet    promethazine-dextromethorphan (PROMETHAZINE-DM) 6.25-15 mg/5 mL Syrp       Future Appointments   Date Time Provider Department Center   10/26/2023  8:30 AM Handy, Aida N, FNP [facility]   6/4/2024  9:30 AM Handy, Aida N, FNP [facility]        Follow up if symptoms worsen or fail to improve.      Signature:     [facility]  [address] [facility] [address]    Date of encounter: 10/26/23    "

## 2023-12-27 ENCOUNTER — HOSPITAL ENCOUNTER (EMERGENCY)
Facility: HOSPITAL | Age: 33
Discharge: HOME | End: 2023-12-27
Attending: EMERGENCY MEDICINE
Payer: MEDICAID

## 2023-12-27 VITALS
TEMPERATURE: 98 F | HEART RATE: 58 BPM | BODY MASS INDEX: 36.61 KG/M2 | DIASTOLIC BLOOD PRESSURE: 83 MMHG | HEIGHT: 70 IN | OXYGEN SATURATION: 100 % | SYSTOLIC BLOOD PRESSURE: 134 MMHG | RESPIRATION RATE: 18 BRPM | WEIGHT: 255.75 LBS

## 2023-12-27 DIAGNOSIS — R19.7 DIARRHEA: ICD-10-CM

## 2023-12-27 DIAGNOSIS — R10.9 ABDOMINAL PAIN, UNSPECIFIED ABDOMINAL LOCATION: Primary | ICD-10-CM

## 2023-12-27 LAB
ALBUMIN SERPL-MCNC: 3.9 G/DL (ref 3.5–5)
ALBUMIN/GLOB SERPL: 1.1 RATIO (ref 1.1–2)
ALP SERPL-CCNC: 80 UNIT/L (ref 40–150)
ALT SERPL-CCNC: 21 UNIT/L (ref 0–55)
APPEARANCE UR: CLEAR
AST SERPL-CCNC: 18 UNIT/L (ref 5–34)
BACTERIA #/AREA URNS AUTO: ABNORMAL /HPF
BASOPHILS # BLD AUTO: 0.04 X10(3)/MCL
BASOPHILS NFR BLD AUTO: 0.5 %
BILIRUB SERPL-MCNC: 0.6 MG/DL
BILIRUB UR QL STRIP.AUTO: NEGATIVE
BUN SERPL-MCNC: 8.3 MG/DL (ref 8.9–20.6)
CALCIUM SERPL-MCNC: 9.6 MG/DL (ref 8.4–10.2)
CHLORIDE SERPL-SCNC: 107 MMOL/L (ref 98–107)
CO2 SERPL-SCNC: 27 MMOL/L (ref 22–29)
COLOR UR AUTO: ABNORMAL
CREAT SERPL-MCNC: 1.05 MG/DL (ref 0.73–1.18)
EOSINOPHIL # BLD AUTO: 0.24 X10(3)/MCL (ref 0–0.9)
EOSINOPHIL NFR BLD AUTO: 2.9 %
ERYTHROCYTE [DISTWIDTH] IN BLOOD BY AUTOMATED COUNT: 11.8 % (ref 11.5–17)
FLUAV AG UPPER RESP QL IA.RAPID: NOT DETECTED
FLUBV AG UPPER RESP QL IA.RAPID: NOT DETECTED
GFR SERPLBLD CREATININE-BSD FMLA CKD-EPI: >60 MLS/MIN/1.73/M2
GLOBULIN SER-MCNC: 3.7 GM/DL (ref 2.4–3.5)
GLUCOSE SERPL-MCNC: 104 MG/DL (ref 74–100)
GLUCOSE UR QL STRIP.AUTO: NORMAL
HCT VFR BLD AUTO: 50.2 % (ref 42–52)
HGB BLD-MCNC: 16.5 G/DL (ref 14–18)
HOLD SPECIMEN: NORMAL
HOLD SPECIMEN: NORMAL
HYALINE CASTS #/AREA URNS LPF: ABNORMAL /LPF
IMM GRANULOCYTES # BLD AUTO: 0.02 X10(3)/MCL (ref 0–0.04)
IMM GRANULOCYTES NFR BLD AUTO: 0.2 %
KETONES UR QL STRIP.AUTO: NEGATIVE
LEUKOCYTE ESTERASE UR QL STRIP.AUTO: NEGATIVE
LIPASE SERPL-CCNC: 40 U/L
LYMPHOCYTES # BLD AUTO: 2.61 X10(3)/MCL (ref 0.6–4.6)
LYMPHOCYTES NFR BLD AUTO: 32 %
MCH RBC QN AUTO: 27.7 PG (ref 27–31)
MCHC RBC AUTO-ENTMCNC: 32.9 G/DL (ref 33–36)
MCV RBC AUTO: 84.4 FL (ref 80–94)
MONOCYTES # BLD AUTO: 0.51 X10(3)/MCL (ref 0.1–1.3)
MONOCYTES NFR BLD AUTO: 6.3 %
MUCOUS THREADS URNS QL MICRO: ABNORMAL /LPF
NEUTROPHILS # BLD AUTO: 4.74 X10(3)/MCL (ref 2.1–9.2)
NEUTROPHILS NFR BLD AUTO: 58.1 %
NITRITE UR QL STRIP.AUTO: NEGATIVE
NRBC BLD AUTO-RTO: 0 %
PH UR STRIP.AUTO: 5.5 [PH]
PLATELET # BLD AUTO: 228 X10(3)/MCL (ref 130–400)
PMV BLD AUTO: 10.1 FL (ref 7.4–10.4)
POTASSIUM SERPL-SCNC: 4.1 MMOL/L (ref 3.5–5.1)
PROT SERPL-MCNC: 7.6 GM/DL (ref 6.4–8.3)
PROT UR QL STRIP.AUTO: NEGATIVE
RBC # BLD AUTO: 5.95 X10(6)/MCL (ref 4.7–6.1)
RBC #/AREA URNS AUTO: ABNORMAL /HPF
RBC UR QL AUTO: NEGATIVE
SARS-COV-2 RNA RESP QL NAA+PROBE: NOT DETECTED
SODIUM SERPL-SCNC: 142 MMOL/L (ref 136–145)
SP GR UR STRIP.AUTO: 1.03 (ref 1–1.03)
SQUAMOUS #/AREA URNS LPF: ABNORMAL /HPF
UROBILINOGEN UR STRIP-ACNC: NORMAL
WBC # SPEC AUTO: 8.16 X10(3)/MCL (ref 4.5–11.5)
WBC #/AREA URNS AUTO: ABNORMAL /HPF

## 2023-12-27 PROCEDURE — 63600175 PHARM REV CODE 636 W HCPCS: Performed by: NURSE PRACTITIONER

## 2023-12-27 PROCEDURE — 85025 COMPLETE CBC W/AUTO DIFF WBC: CPT | Performed by: PHYSICIAN ASSISTANT

## 2023-12-27 PROCEDURE — 80053 COMPREHEN METABOLIC PANEL: CPT | Performed by: PHYSICIAN ASSISTANT

## 2023-12-27 PROCEDURE — 81001 URINALYSIS AUTO W/SCOPE: CPT | Performed by: PHYSICIAN ASSISTANT

## 2023-12-27 PROCEDURE — 0240U COVID/FLU A&B PCR: CPT | Performed by: PHYSICIAN ASSISTANT

## 2023-12-27 PROCEDURE — 99284 EMERGENCY DEPT VISIT MOD MDM: CPT

## 2023-12-27 PROCEDURE — 96372 THER/PROPH/DIAG INJ SC/IM: CPT | Performed by: NURSE PRACTITIONER

## 2023-12-27 PROCEDURE — 83690 ASSAY OF LIPASE: CPT | Performed by: PHYSICIAN ASSISTANT

## 2023-12-27 RX ORDER — ONDANSETRON 2 MG/ML
4 INJECTION INTRAMUSCULAR; INTRAVENOUS ONCE
Status: COMPLETED | OUTPATIENT
Start: 2023-12-27 | End: 2023-12-27

## 2023-12-27 RX ORDER — DICYCLOMINE HYDROCHLORIDE 10 MG/ML
20 INJECTION INTRAMUSCULAR
Status: COMPLETED | OUTPATIENT
Start: 2023-12-27 | End: 2023-12-27

## 2023-12-27 RX ORDER — DICYCLOMINE HYDROCHLORIDE 20 MG/1
20 TABLET ORAL 2 TIMES DAILY
Qty: 20 TABLET | Refills: 0 | Status: SHIPPED | OUTPATIENT
Start: 2023-12-27 | End: 2024-01-06

## 2023-12-27 RX ORDER — ONDANSETRON 4 MG/1
4 TABLET, ORALLY DISINTEGRATING ORAL EVERY 8 HOURS PRN
Qty: 9 TABLET | Refills: 0 | Status: SHIPPED | OUTPATIENT
Start: 2023-12-27 | End: 2023-12-30

## 2023-12-27 RX ADMIN — ONDANSETRON 4 MG: 2 INJECTION INTRAMUSCULAR; INTRAVENOUS at 09:12

## 2023-12-27 RX ADMIN — DICYCLOMINE HYDROCHLORIDE 20 MG: 20 INJECTION, SOLUTION INTRAMUSCULAR at 09:12

## 2023-12-27 NOTE — ED PROVIDER NOTES
Encounter Date: 12/27/2023       History     Chief Complaint   Patient presents with    Abdominal Pain     Pt reports lower abd pain, diarrhea, chills that started this morning.      Pt is a 33 y.o. male who presents to the [facility] ED complaining of abdominal cramping and diarrhea which began this AM. Denies chronic medical conditions, chest pain, SOB, weakness, dizziness, or fever. Denies loss of bowel or bladder control.      Review of patient's allergies indicates:  No Known Allergies  Past Medical History:   Diagnosis Date    At risk for alteration in comfort     Obesity, unspecified     Tobacco abuse      Past Surgical History:   Procedure Laterality Date    arm repair Right     LAPAROSCOPIC APPENDECTOMY N/A 10/22/2022    Procedure: APPENDECTOMY, LAPAROSCOPIC;  Surgeon: Vinay Sims Jr., MD;  Location: [facility];  Service: General;  Laterality: N/A;     Family History   Problem Relation Age of Onset    Hypertension Mother     No Known Problems Father      Social History     Tobacco Use    Smoking status: Every Day     Current packs/day: 0.50     Average packs/day: 0.5 packs/day for 17.0 years (8.5 ttl pk-yrs)     Types: Cigarettes     Start date: 2007    Smokeless tobacco: Never   Substance Use Topics    Alcohol use: Yes     Comment: occasionally    Drug use: Yes     Frequency: 7.0 times per week     Types: Marijuana     Review of Systems   Constitutional:  Negative for chills, diaphoresis, fatigue and fever.   HENT:  Negative for facial swelling, postnasal drip, rhinorrhea, sinus pressure, sinus pain, sore throat and trouble swallowing.    Respiratory:  Negative for cough, chest tightness, shortness of breath and wheezing.    Cardiovascular:  Negative for chest pain, palpitations and leg swelling.   Gastrointestinal:  Positive for abdominal pain, diarrhea and nausea. Negative for vomiting.   Genitourinary:  Negative for dysuria, flank pain, hematuria and urgency.   Musculoskeletal:  Negative for arthralgias,  back pain and myalgias.   Skin:  Negative for color change and rash.   Neurological:  Negative for dizziness, syncope, weakness and headaches.   Hematological:  Does not bruise/bleed easily.   All other systems reviewed and are negative.      Physical Exam     Initial Vitals [12/27/23 0852]   BP Pulse Resp Temp SpO2   135/85 (!) 51 18 97.8 °F (36.6 °C) 100 %      MAP       --         Physical Exam    Nursing note and vitals reviewed.  Constitutional: Vital signs are normal. He appears well-developed and well-nourished.   HENT:   Head: Normocephalic.   Nose: Nose normal.   Mouth/Throat: Oropharynx is clear and moist.   Eyes: Conjunctivae and EOM are normal. Pupils are equal, round, and reactive to light.   Neck: Neck supple.   Normal range of motion.  Cardiovascular:  Normal rate, regular rhythm, normal heart sounds and intact distal pulses.           Pulmonary/Chest: Effort normal and breath sounds normal. No respiratory distress. He has no wheezes. He has no rhonchi. He has no rales. He exhibits no tenderness.   Abdominal: Abdomen is soft and flat. Bowel sounds are normal. There is generalized abdominal tenderness. There is no rebound, no guarding, no tenderness at McBurney's point and negative Juares's sign.   Musculoskeletal:         General: Normal range of motion.      Cervical back: Normal range of motion and neck supple.     Neurological: He is alert and oriented to person, place, and time. He has normal strength.   Skin: Skin is warm and dry. Capillary refill takes less than 2 seconds.   Psychiatric: He has a normal mood and affect. His behavior is normal. Judgment and thought content normal.         ED Course   Procedures  Labs Reviewed   COMPREHENSIVE METABOLIC PANEL - Abnormal; Notable for the following components:       Result Value    Glucose Level 104 (*)     Blood Urea Nitrogen 8.3 (*)     Globulin 3.7 (*)     All other components within normal limits   URINALYSIS, REFLEX TO URINE CULTURE - Abnormal;  Notable for the following components:    Squamous Epithelial Cells, UA Trace (*)     Mucous, UA Trace (*)     All other components within normal limits   CBC WITH DIFFERENTIAL - Abnormal; Notable for the following components:    MCHC 32.9 (*)     All other components within normal limits   LIPASE - Normal   COVID/FLU A&B PCR - Normal    Narrative:     The Xpert Xpress SARS-CoV-2/FLU/RSV plus is a rapid, multiplexed real-time PCR test intended for the simultaneous qualitative detection and differentiation of SARS-CoV-2, Influenza A, Influenza B, and respiratory syncytial virus (RSV) viral RNA in either nasopharyngeal swab or nasal swab specimens.         CBC W/ AUTO DIFFERENTIAL    Narrative:     The following orders were created for panel order CBC auto differential.  Procedure                               Abnormality         Status                     ---------                               -----------         ------                     CBC with Differential[[id number]]       Abnormal            Final result                 Please view results for these tests on the individual orders.   EXTRA TUBES    Narrative:     The following orders were created for panel order EXTRA TUBES.  Procedure                               Abnormality         Status                     ---------                               -----------         ------                     Light Blue Top Hold[[id number]]                             Final result               Gold Top Hold[[id number]]                                   Final result                 Please view results for these tests on the individual orders.   LIGHT BLUE TOP HOLD   GOLD TOP HOLD          Imaging Results              X-Ray Abdomen Flat And Erect (Final result)  Result time 12/27/23 11:13:36      Final result by Martell Tellez MD (12/27/23 11:13:36)                   Impression:      Nonspecific bowel gas pattern.      Electronically signed by: Martell  Tellez  Date:    12/27/2023  Time:    11:13               Narrative:    EXAMINATION:  XR ABDOMEN FLAT AND ERECT    CLINICAL HISTORY:  Diarrhea, unspecified    TECHNIQUE:  Two views    COMPARISON:  September 15, 2021    FINDINGS:  The intestinal gas pattern is nonspecific and nonobstructive. No air fluid levels or pneumoperitoneum identified.  Liver appears mildly enlarged in size.                                       Medications   dicyclomine injection 20 mg (20 mg Intramuscular Given 12/27/23 0924)   ondansetron injection 4 mg (4 mg Intramuscular Given 12/27/23 0929)     Medical Decision Making  Differential:  Gastroenteritis  Diarrhea  Influenza    Amount and/or Complexity of Data Reviewed  Labs: ordered.  Radiology: ordered.    Risk  Prescription drug management.               ED Course as of 12/27/23 1121   Wed Dec 27, 2023   1117 Given strict ED return precautions. I have spoken with the patient and/or caregivers. I have explained the patient's condition, diagnoses and treatment plan based on the information available to me at this time. I have answered the patient's and/or caregiver's questions and addressed any concerns. The patient and/or caregivers have as good an understanding of the patient's diagnosis, condition and treatment plan as can be expected at this point. The vital signs have been stable. The patient's condition is stable and appropriate for discharge from the emergency department.      The patient will pursue further outpatient evaluation with the primary care physician or other designated or consulting physician as outlined in the discharge instructions. The patient and/or caregivers are agreeable to this plan of care and follow-up instructions have been explained in detail. The patient and/or caregivers have received these instructions in written format and have expressed an understanding of the discharge instructions. The patient and/or caregivers are aware that any significant change in  condition or worsening of symptoms should prompt an immediate return to this or the closest emergency department or a call to 911.   [JA]      ED Course User Index  [JA] Andreas Acevedo Jr., FNP                           Clinical Impression:  Final diagnoses:  [R19.7] Diarrhea  [R10.9] Abdominal pain, unspecified abdominal location (Primary)          ED Disposition Condition    Discharge Stable          ED Prescriptions       Medication Sig Dispense Start Date End Date Auth. Provider    dicyclomine (BENTYL) 20 mg tablet Take 1 tablet (20 mg total) by mouth 2 (two) times daily. for 10 days 20 tablet 12/27/2023 1/6/2024 Andreas Acevedo Jr., FNP    ondansetron (ZOFRAN-ODT) 4 MG TbDL Take 1 tablet (4 mg total) by mouth every 8 (eight) hours as needed (Nausea). 9 tablet 12/27/2023 12/30/2023 Andreas Acevedo Jr., FNP          Follow-up Information       Follow up With Specialties Details Why Contact Info    Aida Jackson FNP Nurse Practitioner In 3 days  [address] [facility] [address]  [phone]      [facility] Emergency Medicine In 3 days As needed, If symptoms worsen [address] [facility] [address] [phone]             Andreas Acevedo Jr., FNP  12/27/23 7940

## 2023-12-27 NOTE — Clinical Note
"Figueroa"Wesley Murcia was seen and treated in our emergency department on 12/27/2023.  He may return to work on 12/30/2023.       If you have any questions or concerns, please don't hesitate to call.      Andreas Acevedo Jr., FNP"

## 2023-12-27 NOTE — DISCHARGE INSTRUCTIONS
Increase fluid intake, clear liquids x 12 hours. Advance diet slowly.  Increase fiber in your diet.  Take medication as prescribed.  Follow up with your primary care physician in 3-5 days for follow up evaluation.

## 2024-01-31 ENCOUNTER — HOSPITAL ENCOUNTER (EMERGENCY)
Facility: HOSPITAL | Age: 34
Discharge: HOME | End: 2024-01-31
Attending: INTERNAL MEDICINE
Payer: MEDICAID

## 2024-01-31 VITALS
RESPIRATION RATE: 18 BRPM | SYSTOLIC BLOOD PRESSURE: 141 MMHG | OXYGEN SATURATION: 99 % | HEIGHT: 70 IN | TEMPERATURE: 98 F | BODY MASS INDEX: 37.22 KG/M2 | DIASTOLIC BLOOD PRESSURE: 75 MMHG | WEIGHT: 260 LBS | HEART RATE: 85 BPM

## 2024-01-31 DIAGNOSIS — M25.561 ACUTE PAIN OF RIGHT KNEE: Primary | ICD-10-CM

## 2024-01-31 DIAGNOSIS — S89.90XA KNEE INJURY: ICD-10-CM

## 2024-01-31 PROCEDURE — 99283 EMERGENCY DEPT VISIT LOW MDM: CPT

## 2024-01-31 RX ORDER — KETOROLAC TROMETHAMINE 10 MG/1
10 TABLET, FILM COATED ORAL EVERY 6 HOURS PRN
Qty: 28 TABLET | Refills: 0 | Status: SHIPPED | OUTPATIENT
Start: 2024-01-31 | End: 2024-02-07

## 2024-02-01 NOTE — ED PROVIDER NOTES
Encounter Date: 1/31/2024       History     Chief Complaint   Patient presents with    Knee Injury     Pt presents to ed for c/o R knee pain after falling while playing basketball yesterday. Pt states soreness today. No swelling or deformity noted at present. Pt able to bare weight.      The history is provided by the patient. No  was used.       34 yo M presents with c/o r knee pain after falling onto it 1 day ago. Patient was attempting to dragback a basketball when he slipped and landed directly onto his knee. Reports swelling, difficulty and pain with flexion. Pain is worst on the lateral aspect of his knee. He has been able to bear weight but has had limited mobility. Tramadol and ibuprofen 800 at home have not provided any relief.     Review of patient's allergies indicates:  No Known Allergies  Past Medical History:   Diagnosis Date    At risk for alteration in comfort     Obesity, unspecified     Tobacco abuse      Past Surgical History:   Procedure Laterality Date    arm repair Right     LAPAROSCOPIC APPENDECTOMY N/A 10/22/2022    Procedure: APPENDECTOMY, LAPAROSCOPIC;  Surgeon: Vinay Sims Jr., MD;  Location: [facility];  Service: General;  Laterality: N/A;     Family History   Problem Relation Age of Onset    Hypertension Mother     No Known Problems Father      Social History     Tobacco Use    Smoking status: Every Day     Current packs/day: 0.50     Average packs/day: 0.5 packs/day for 17.1 years (8.5 ttl pk-yrs)     Types: Cigarettes     Start date: 2007    Smokeless tobacco: Never   Substance Use Topics    Alcohol use: Yes     Comment: occasionally    Drug use: Yes     Frequency: 7.0 times per week     Types: Marijuana     Review of Systems   Constitutional:  Negative for fever.   HENT:  Negative for sore throat.    Respiratory:  Negative for shortness of breath.    Cardiovascular:  Negative for chest pain.   Gastrointestinal:  Negative for nausea.   Genitourinary:  Negative  for dysuria.   Musculoskeletal:  Positive for gait problem and joint swelling. Negative for back pain.        R knee pain   Skin:  Negative for rash.   Neurological:  Negative for weakness.   Hematological:  Does not bruise/bleed easily.       Physical Exam     Initial Vitals [01/31/24 1935]   BP Pulse Resp Temp SpO2   (!) 141/75 85 18 98.2 °F (36.8 °C) 99 %      MAP       --         Physical Exam    Nursing note and vitals reviewed.  Constitutional: He appears well-developed and well-nourished. No distress.   HENT:   Head: Normocephalic and atraumatic.   Eyes: EOM are normal. Pupils are equal, round, and reactive to light.   Neck: Neck supple.   Normal range of motion.  Cardiovascular:  Normal rate, regular rhythm and normal heart sounds.           No murmur heard.  Pulmonary/Chest: No respiratory distress. He has no wheezes. He has no rhonchi.   Abdominal: Abdomen is soft. Bowel sounds are normal. He exhibits no distension. There is no abdominal tenderness. There is no rebound.   Musculoskeletal:      Cervical back: Normal range of motion and neck supple.      Comments: Diffuse, though mild, swelling over right knee. TTP along fibular head. No TTP over the patella. Difficulty flexing knee to 90 degrees. Is able to extend fully. No overlying warmth or erythema. Limping, unstable gait.     Skin: Skin is warm and dry.   Psychiatric: He has a normal mood and affect.         ED Course   Procedures  Labs Reviewed - No data to display       Imaging Results              X-Ray Knee 3 View Right (In process)                      Medications - No data to display  Medical Decision Making  Amount and/or Complexity of Data Reviewed  Radiology: ordered and independent interpretation performed. Decision-making details documented in ED Course.    Risk  Prescription drug management.    + Hale Knee Rules   - Fibular TTP  - Unable to flex 90 degrees without assistance      Additional MDM:   Differential Diagnosis:   Other: The  following diagnoses were also considered and will be evaluated: fibular head fracture, fibular contusion and patellar contusion. Contusion, fracture, sprain among others            Attending Attestation:   Physician Attestation Statement for Resident:  As the supervising MD   Physician Attestation Statement: I have personally seen and examined this patient.   I agree with the above history.  -:   As the supervising MD I agree with the above PE.     As the supervising MD I agree with the above treatment, course, plan, and disposition.    I have reviewed and agree with the residents interpretation of the following: x-rays.                 ED Course as of 02/02/24 0145   Wed Jan 31, 2024 2059 X-ray negative for acute fracture or dislocation. Will DC with NSAID.  [AH]   2059 Given strict ED return precautions. I have spoken with the patient and/or caregivers. I have explained the patient's condition, diagnoses and treatment plan based on the information available to me at this time. I have answered the patient's and/or caregiver's questions and addressed any concerns. The patient and/or caregivers have as good an understanding of the patient's diagnosis, condition and treatment plan as can be expected at this point. The vital signs have been stable. The patient's condition is stable and appropriate for discharge from the emergency department.      The patient will pursue further outpatient evaluation with the primary care physician or other designated or consulting physician as outlined in the discharge instructions. The patient and/or caregivers are agreeable to this plan of care and follow-up instructions have been explained in detail. The patient and/or caregivers have received these instructions in written format and have expressed an understanding of the discharge instructions. The patient and/or caregivers are aware that any significant change in condition or worsening of symptoms should prompt an immediate  return to this or the closest emergency department or a call to 911.   []   2126 X-Ray Knee 3 View Right []      ED Course User Index  [] Fernando Mills MD                           Clinical Impression:  Final diagnoses:  [M25.561] Acute pain of right knee (Primary)          ED Disposition Condition    Discharge Stable          ED Prescriptions       Medication Sig Dispense Start Date End Date Auth. Provider    ketorolac (TORADOL) 10 mg tablet Take 1 tablet (10 mg total) by mouth every 6 (six) hours as needed for Pain. 28 tablet 1/31/2024 2/7/2024 Fernanod Mills MD          Follow-up Information       Follow up With Specialties Details Why Contact Info    Aida Jackson FNP Nurse Practitioner In 2 weeks  [address] [facility] [address]  [phone]      [facility] Emergency Medicine  If symptoms worsen [address] [facility] [address] [phone]             Fernando Mills MD  Resident  01/31/24 1152       Antonio Hatfield MD  02/02/24 0145

## 2024-02-06 ENCOUNTER — HOSPITAL ENCOUNTER (EMERGENCY)
Facility: HOSPITAL | Age: 34
Discharge: LEFT BEFORE DISCHARGE | End: 2024-02-06
Payer: MEDICAID

## 2024-02-06 ENCOUNTER — HOSPITAL ENCOUNTER (EMERGENCY)
Facility: HOSPITAL | Age: 34
Discharge: LEFT BEFORE DISCHARGE | End: 2024-02-06
Attending: EMERGENCY MEDICINE
Payer: MEDICAID

## 2024-02-06 ENCOUNTER — OFFICE VISIT (OUTPATIENT)
Dept: URGENT CARE | Facility: CLINIC | Age: 34
End: 2024-02-06
Payer: MEDICAID

## 2024-02-06 VITALS
RESPIRATION RATE: 18 BRPM | DIASTOLIC BLOOD PRESSURE: 81 MMHG | SYSTOLIC BLOOD PRESSURE: 145 MMHG | BODY MASS INDEX: 36.82 KG/M2 | HEART RATE: 61 BPM | HEIGHT: 70 IN | OXYGEN SATURATION: 100 % | TEMPERATURE: 98 F | WEIGHT: 257.19 LBS

## 2024-02-06 VITALS
OXYGEN SATURATION: 98 % | WEIGHT: 253.5 LBS | DIASTOLIC BLOOD PRESSURE: 81 MMHG | SYSTOLIC BLOOD PRESSURE: 153 MMHG | RESPIRATION RATE: 18 BRPM | TEMPERATURE: 98 F | BODY MASS INDEX: 36.38 KG/M2 | HEART RATE: 60 BPM

## 2024-02-06 DIAGNOSIS — R09.89 SYMPTOMS OF UPPER RESPIRATORY INFECTION (URI): ICD-10-CM

## 2024-02-06 DIAGNOSIS — B34.9 VIRAL SYNDROME: Primary | ICD-10-CM

## 2024-02-06 DIAGNOSIS — R10.9 ABDOMINAL PAIN, UNSPECIFIED ABDOMINAL LOCATION: Primary | ICD-10-CM

## 2024-02-06 DIAGNOSIS — R11.2 NAUSEA AND VOMITING, UNSPECIFIED VOMITING TYPE: ICD-10-CM

## 2024-02-06 LAB
ALBUMIN SERPL-MCNC: 4.1 G/DL (ref 3.5–5)
ALBUMIN/GLOB SERPL: 1.1 RATIO (ref 1.1–2)
ALP SERPL-CCNC: 80 UNIT/L (ref 40–150)
ALT SERPL-CCNC: 20 UNIT/L (ref 0–55)
AST SERPL-CCNC: 19 UNIT/L (ref 5–34)
BASOPHILS # BLD AUTO: 0.01 X10(3)/MCL
BASOPHILS NFR BLD AUTO: 0.1 %
BILIRUB SERPL-MCNC: 0.6 MG/DL
BUN SERPL-MCNC: 9.9 MG/DL (ref 8.9–20.6)
CALCIUM SERPL-MCNC: 9.9 MG/DL (ref 8.4–10.2)
CHLORIDE SERPL-SCNC: 105 MMOL/L (ref 98–107)
CO2 SERPL-SCNC: 28 MMOL/L (ref 22–29)
CREAT SERPL-MCNC: 0.98 MG/DL (ref 0.73–1.18)
CTP QC/QA: YES
EOSINOPHIL # BLD AUTO: 0 X10(3)/MCL (ref 0–0.9)
EOSINOPHIL NFR BLD AUTO: 0 %
ERYTHROCYTE [DISTWIDTH] IN BLOOD BY AUTOMATED COUNT: 11.9 % (ref 11.5–17)
GFR SERPLBLD CREATININE-BSD FMLA CKD-EPI: >60 MLS/MIN/1.73/M2
GLOBULIN SER-MCNC: 3.9 GM/DL (ref 2.4–3.5)
GLUCOSE SERPL-MCNC: 132 MG/DL (ref 74–100)
HCT VFR BLD AUTO: 45.2 % (ref 42–52)
HGB BLD-MCNC: 15.5 G/DL (ref 14–18)
HOLD SPECIMEN: NORMAL
HOLD SPECIMEN: NORMAL
IMM GRANULOCYTES # BLD AUTO: 0.04 X10(3)/MCL (ref 0–0.04)
IMM GRANULOCYTES NFR BLD AUTO: 0.3 %
LIPASE SERPL-CCNC: 25 U/L
LYMPHOCYTES # BLD AUTO: 1.11 X10(3)/MCL (ref 0.6–4.6)
LYMPHOCYTES NFR BLD AUTO: 8.2 %
MAGNESIUM SERPL-MCNC: 1.6 MG/DL (ref 1.6–2.6)
MCH RBC QN AUTO: 28.6 PG (ref 27–31)
MCHC RBC AUTO-ENTMCNC: 34.3 G/DL (ref 33–36)
MCV RBC AUTO: 83.4 FL (ref 80–94)
MOLECULAR STREP A: NEGATIVE
MONOCYTES # BLD AUTO: 0.41 X10(3)/MCL (ref 0.1–1.3)
MONOCYTES NFR BLD AUTO: 3 %
NEUTROPHILS # BLD AUTO: 11.91 X10(3)/MCL (ref 2.1–9.2)
NEUTROPHILS NFR BLD AUTO: 88.4 %
NRBC BLD AUTO-RTO: 0 %
PLATELET # BLD AUTO: 238 X10(3)/MCL (ref 130–400)
PMV BLD AUTO: 9.4 FL (ref 7.4–10.4)
POC MOLECULAR INFLUENZA A AGN: NEGATIVE
POC MOLECULAR INFLUENZA B AGN: NEGATIVE
POTASSIUM SERPL-SCNC: 3.6 MMOL/L (ref 3.5–5.1)
PROT SERPL-MCNC: 8 GM/DL (ref 6.4–8.3)
RBC # BLD AUTO: 5.42 X10(6)/MCL (ref 4.7–6.1)
SARS-COV-2 RDRP RESP QL NAA+PROBE: NEGATIVE
SODIUM SERPL-SCNC: 138 MMOL/L (ref 136–145)
WBC # SPEC AUTO: 13.48 X10(3)/MCL (ref 4.5–11.5)

## 2024-02-06 PROCEDURE — 96374 THER/PROPH/DIAG INJ IV PUSH: CPT

## 2024-02-06 PROCEDURE — 96361 HYDRATE IV INFUSION ADD-ON: CPT

## 2024-02-06 PROCEDURE — 99214 OFFICE O/P EST MOD 30 MIN: CPT | Mod: PBBFAC,25 | Performed by: FAMILY MEDICINE

## 2024-02-06 PROCEDURE — 99284 EMERGENCY DEPT VISIT MOD MDM: CPT | Mod: 25,27

## 2024-02-06 PROCEDURE — 83735 ASSAY OF MAGNESIUM: CPT | Performed by: EMERGENCY MEDICINE

## 2024-02-06 PROCEDURE — 96372 THER/PROPH/DIAG INJ SC/IM: CPT | Mod: 59 | Performed by: EMERGENCY MEDICINE

## 2024-02-06 PROCEDURE — 87651 STREP A DNA AMP PROBE: CPT | Mod: PBBFAC | Performed by: FAMILY MEDICINE

## 2024-02-06 PROCEDURE — 85025 COMPLETE CBC W/AUTO DIFF WBC: CPT | Performed by: EMERGENCY MEDICINE

## 2024-02-06 PROCEDURE — 83690 ASSAY OF LIPASE: CPT | Performed by: EMERGENCY MEDICINE

## 2024-02-06 PROCEDURE — 25000003 PHARM REV CODE 250: Performed by: FAMILY MEDICINE

## 2024-02-06 PROCEDURE — 63600175 PHARM REV CODE 636 W HCPCS: Performed by: EMERGENCY MEDICINE

## 2024-02-06 PROCEDURE — 87635 SARS-COV-2 COVID-19 AMP PRB: CPT | Mod: PBBFAC | Performed by: FAMILY MEDICINE

## 2024-02-06 PROCEDURE — 99900041 HC LEFT WITHOUT BEING SEEN- EMERGENCY

## 2024-02-06 PROCEDURE — 80053 COMPREHEN METABOLIC PANEL: CPT | Performed by: EMERGENCY MEDICINE

## 2024-02-06 PROCEDURE — 87502 INFLUENZA DNA AMP PROBE: CPT | Mod: PBBFAC | Performed by: FAMILY MEDICINE

## 2024-02-06 PROCEDURE — 99214 OFFICE O/P EST MOD 30 MIN: CPT | Mod: S$PBB,,, | Performed by: FAMILY MEDICINE

## 2024-02-06 RX ORDER — KETOROLAC TROMETHAMINE 30 MG/ML
15 INJECTION, SOLUTION INTRAMUSCULAR; INTRAVENOUS
Status: COMPLETED | OUTPATIENT
Start: 2024-02-06 | End: 2024-02-06

## 2024-02-06 RX ORDER — ONDANSETRON 4 MG/1
8 TABLET, ORALLY DISINTEGRATING ORAL
Status: COMPLETED | OUTPATIENT
Start: 2024-02-06 | End: 2024-02-06

## 2024-02-06 RX ORDER — ONDANSETRON 8 MG/1
8 TABLET, ORALLY DISINTEGRATING ORAL EVERY 8 HOURS PRN
Qty: 10 TABLET | Refills: 0 | OUTPATIENT
Start: 2024-02-06 | End: 2024-02-10

## 2024-02-06 RX ORDER — PROCHLORPERAZINE MALEATE 10 MG
10 TABLET ORAL EVERY 6 HOURS PRN
Qty: 15 TABLET | Refills: 0 | Status: SHIPPED | OUTPATIENT
Start: 2024-02-06 | End: 2024-06-18

## 2024-02-06 RX ORDER — DICYCLOMINE HYDROCHLORIDE 10 MG/ML
20 INJECTION INTRAMUSCULAR
Status: COMPLETED | OUTPATIENT
Start: 2024-02-06 | End: 2024-02-06

## 2024-02-06 RX ORDER — PROCHLORPERAZINE EDISYLATE 5 MG/ML
10 INJECTION INTRAMUSCULAR; INTRAVENOUS
Status: COMPLETED | OUTPATIENT
Start: 2024-02-06 | End: 2024-02-06

## 2024-02-06 RX ADMIN — SODIUM CHLORIDE, POTASSIUM CHLORIDE, SODIUM LACTATE AND CALCIUM CHLORIDE 1000 ML: 600; 310; 30; 20 INJECTION, SOLUTION INTRAVENOUS at 05:02

## 2024-02-06 RX ADMIN — DICYCLOMINE HYDROCHLORIDE 20 MG: 20 INJECTION, SOLUTION INTRAMUSCULAR at 05:02

## 2024-02-06 RX ADMIN — ONDANSETRON 8 MG: 4 TABLET, ORALLY DISINTEGRATING ORAL at 10:02

## 2024-02-06 RX ADMIN — KETOROLAC TROMETHAMINE 15 MG: 30 INJECTION, SOLUTION INTRAMUSCULAR; INTRAVENOUS at 06:02

## 2024-02-06 RX ADMIN — PROCHLORPERAZINE EDISYLATE 10 MG: 5 INJECTION INTRAMUSCULAR; INTRAVENOUS at 05:02

## 2024-02-06 NOTE — ED PROVIDER NOTES
ED PROVIDER NOTE  2/6/2024    CHIEF COMPLAINT:   Chief Complaint   Patient presents with    Abdominal Pain    Emesis     PT IN /[facility] W CO CONTINUED UPPER ABD PAIN W NV SINCE THIS AM.  SEEN IN [facility] TODAY, RX MED NOT HELPING.  HX OF DAILY MARIJUANA USE. VSS. IV TO LT HAND W 4 MG ZOFRAN GIVEN EN ROUTE.        HISTORY OF PRESENT ILLNESS:   Figueroa Murcia is a 33 y.o. male who presents with chief complaint Abdominal pain. Onset was this morning when he started having diffuse abdominal pain characterized as tightness that is aggravated with movement, nothing makes it better. Associated symptoms of nausea, vomiting, generalized weakness. Reports he went to urgent care and they gave him zofran but it is not helping.     The history is provided by the patient.         REVIEW OF SYSTEMS: as noted in the HPI.  NURSING NOTES REVIEWED      PAST MEDICAL/SURGICAL HISTORY:   Past Medical History:   Diagnosis Date    At risk for alteration in comfort     Obesity, unspecified     Tobacco abuse       Past Surgical History:   Procedure Laterality Date    arm repair Right     LAPAROSCOPIC APPENDECTOMY N/A 10/22/2022    Procedure: APPENDECTOMY, LAPAROSCOPIC;  Surgeon: Vinay Sims Jr., MD;  Location: [facility];  Service: General;  Laterality: N/A;       FAMILY HISTORY:   Family History   Problem Relation Age of Onset    Hypertension Mother     No Known Problems Father        SOCIAL HISTORY:   Social History     Tobacco Use    Smoking status: Every Day     Current packs/day: 0.50     Average packs/day: 0.5 packs/day for 17.1 years (8.5 ttl pk-yrs)     Types: Cigarettes     Start date: 2007    Smokeless tobacco: Never   Substance Use Topics    Alcohol use: Not Currently     Comment: occasionally    Drug use: Yes     Types: Marijuana     Comment: 4 blunts per day       ALLERGIES: Review of patient's allergies indicates:  No Known Allergies    PHYSICAL EXAM:  Initial Vitals [02/06/24 1649]   BP Pulse Resp Temp SpO2   133/81 (!) 56 18 97.9  °F (36.6 °C) 99 %      MAP       --         Physical Exam    Nursing note and vitals reviewed.  Constitutional: He appears well-developed and well-nourished. No distress.   HENT:   Head: Normocephalic and atraumatic.   Nose: Nose normal.   Mouth/Throat: Oropharynx is clear and moist and mucous membranes are normal.   Eyes: Conjunctivae and EOM are normal. Pupils are equal, round, and reactive to light.   Neck: Neck supple. No tracheal deviation present.   Cardiovascular:  Regular rhythm, normal heart sounds, intact distal pulses and normal pulses.   Bradycardia present.         Pulmonary/Chest: Effort normal and breath sounds normal. No respiratory distress.   Abdominal: Abdomen is soft. There is generalized abdominal tenderness. There is no rebound and no guarding.   Musculoskeletal:         General: Normal range of motion.      Cervical back: Neck supple.     Neurological: He is alert and oriented to person, place, and time. GCS eye subscore is 4. GCS verbal subscore is 5. GCS motor subscore is 6.   CN II-XII intact. Moves all extremities. No gross sensory or motor deficits.   Skin: Skin is warm, dry and intact.   Psychiatric: He has a normal mood and affect. His speech is normal and behavior is normal. Judgment and thought content normal. Cognition and memory are normal.         RESULTS:  Labs Reviewed   COMPREHENSIVE METABOLIC PANEL - Abnormal; Notable for the following components:       Result Value    Glucose Level 132 (*)     Globulin 3.9 (*)     All other components within normal limits   CBC WITH DIFFERENTIAL - Abnormal; Notable for the following components:    WBC 13.48 (*)     Neut # 11.91 (*)     All other components within normal limits   LIPASE - Normal   MAGNESIUM - Normal   CBC W/ AUTO DIFFERENTIAL    Narrative:     The following orders were created for panel order CBC auto differential.  Procedure                               Abnormality         Status                     ---------                                -----------         ------                     CBC with Differential[[id number]]       Abnormal            Final result                 Please view results for these tests on the individual orders.   EXTRA TUBES    Narrative:     The following orders were created for panel order EXTRA TUBES.  Procedure                               Abnormality         Status                     ---------                               -----------         ------                     Light Blue Top Hold[[id number]]                             Final result               Gold Top Hold[[id number]]                                   Final result                 Please view results for these tests on the individual orders.   LIGHT BLUE TOP HOLD   GOLD TOP HOLD     Imaging Results    None         PROCEDURES:  Procedures    ECG:       ED COURSE AND MEDICAL DECISION MAKING:  Medications   prochlorperazine injection Soln 10 mg (10 mg Intravenous Given 2/6/24 1720)   dicyclomine injection 20 mg (20 mg Intramuscular Given 2/6/24 1719)   lactated ringers bolus 1,000 mL ( Intravenous Verify Only 2/6/24 1834)   ketorolac injection 15 mg (15 mg Intravenous Given 2/6/24 1837)     ED Course as of 02/06/24 2312 Tue Feb 06, 2024   1736 WBC(!): 13.48 [IB]   1736 Hemoglobin: 15.5 [IB]   1736 Platelet Count: 238 [IB]   1754 Magnesium : 1.60 [IB]   1754 Lipase: 25 [IB]   1754 Creatinine: 0.98 [IB]   1815 Reports nausea and vomiting resolved but abdominal pain persists. Will give Toradol and get CT Abdpel. [IB]      ED Course User Index  [IB] Tristin Padgett, DO        Medical Decision Making  33-year-old male who presents with diffuse abdominal pain associated with nausea and vomiting that began this morning.  Reports being seen at urgent care and given Zofran which he states is not helping.  Was given a dose of Compazine here with resolution of his nausea and vomiting but his abdominal pain persisted even after IM Bentyl.  CBC shows a  leukocytosis of 13.4 otherwise unremarkable.  CMP grossly unremarkable.  Lipase normal.  Discussed with the patient getting a CT of the abdomen and pelvis to rule out any acute intra-abdominal pathology.  Patient apparently became upset due to being in the ED for over 2 hours and reportedly eloped.    Problems Addressed:  Abdominal pain, unspecified abdominal location: complicated acute illness or injury     Details: Differential diagnosis includes bowel obstruction, enteritis, colitis, IBS, IBD    Amount and/or Complexity of Data Reviewed  External Data Reviewed: notes.  Labs: ordered. Decision-making details documented in ED Course.    Risk  Prescription drug management.        CLINICAL IMPRESSION:  1. Abdominal pain, unspecified abdominal location    2. Nausea and vomiting, unspecified vomiting type        DISPOSITION:   ED Disposition Condition    Eloped Stable                    Tristin Padgett,   02/06/24 5722

## 2024-02-06 NOTE — PROGRESS NOTES
"Subjective:       Patient ID: Figueroa Murcia is a 33 y.o. male.    Vitals:  height is 5' 10" (1.778 m) and weight is 116.7 kg (257 lb 3.2 oz). His temperature is 97.7 °F (36.5 °C). His blood pressure is 145/81 (abnormal) and his pulse is 61. His respiration is 18 and oxygen saturation is 100%.     Chief Complaint: URI (Hot/cold, weakness, vomiting since this AM)    URI   This is a new problem. The current episode started today. There has been no fever. Associated symptoms include nausea and vomiting. Pertinent negatives include no abdominal pain, chest pain, diarrhea, headaches, joint swelling, neck pain, rash, rhinorrhea, sore throat, swollen glands or wheezing.         HENT:  Negative for sore throat.    Neck: Negative for neck pain.   Cardiovascular:  Negative for chest pain.   Respiratory:  Negative for wheezing.    Gastrointestinal:  Positive for nausea and vomiting. Negative for abdominal pain and diarrhea.   Skin:  Negative for rash.   Neurological:  Negative for headaches.       Objective:   Physical Exam   Constitutional: He does not appear ill. No distress.   Cardiovascular: Normal rate.   Pulmonary/Chest: Effort normal. No respiratory distress.   Abdominal: He exhibits no distension. Soft. There is no abdominal tenderness. There is no rebound, no guarding, no left CVA tenderness and no right CVA tenderness.   Neurological: He is alert.   Nursing note and vitals reviewed.      Results for orders placed or performed in visit on 02/06/24   POCT COVID-19 Rapid Screening   Result Value Ref Range    POC Rapid COVID Negative Negative     Acceptable Yes    POCT Influenza A/B Molecular   Result Value Ref Range    POC Molecular Influenza A Ag Negative Negative, Not Reported    POC Molecular Influenza B Ag Negative Negative, Not Reported     Acceptable Yes    POCT Strep A, Molecular   Result Value Ref Range    Molecular Strep A, POC Negative Negative     Acceptable Yes  "       Assessment:     1. Viral syndrome    2. Symptoms of upper respiratory infection (URI)          Plan:   If a medication was prescribed, please take as directed.  Drink plenty of fluids. Slowly advance diet as tolerated.    Please follow instructions on patient education material.  Return to urgent care in 2 to 3 days if symptoms are not improving, immediately if you develop new or worsening symptoms.    Viral syndrome  -     ondansetron (ZOFRAN-ODT) 8 MG TbDL; Take 1 tablet (8 mg total) by mouth every 8 (eight) hours as needed (nausea).  Dispense: 10 tablet; Refill: 0  -     ondansetron disintegrating tablet 8 mg    Symptoms of upper respiratory infection (URI)  -     POCT COVID-19 Rapid Screening  -     POCT Influenza A/B Molecular  -     POCT Strep A, Molecular        Please note: This chart was completed via voice to text dictation. It may contain typographical/word recognition errors. If there are any questions, please contact the provider for final clarification.

## 2024-02-06 NOTE — LETTER
Patient: Figueroa Murcia  YOB: 1990  Date: 2/6/2024 Time: 6:54 PM  Location: [facility]    Leaving the Hospital Against Medical Advice    Chart #:[id number]    This will certify that I, the undersigned,    ______________________________________________________________________    A patient in the above named medical center, having requested discharge and removal from the medical center against the advice of my attending physician(s), hereby release [facility], its physicians, officers and employees, severally and individually, from any and all liability of any nature whatsoever for any injury or harm or complication of any kind that may result directly or indirectly, by reason of my terminating my stay as a patient at [facility] and my departure from [facility], and hereby waive any and all rights of action I may now have or later acquire as a result of my voluntary departure from [facility] and the termination of my stay as a patient therein.    This release is made with the full knowledge of the danger that may result from the action which I am taking.      Date:_______________________                         ___________________________                                                                                    Patient/Legal Representative    Witness:        ____________________________                          ___________________________  Nurse                                                                        Physician

## 2024-02-07 NOTE — ED NOTES
Patient wanting to leave AMA stating he doesn't have time to wait for a CT.  Patient becoming loud and belligerent about the time it takes.  Patient threatening me the nurse and Enid the PA.  Security called and the patient was walked out while threatening the nurse.

## 2024-02-10 ENCOUNTER — HOSPITAL ENCOUNTER (EMERGENCY)
Facility: HOSPITAL | Age: 34
Discharge: HOME | End: 2024-02-10
Attending: EMERGENCY MEDICINE
Payer: MEDICAID

## 2024-02-10 VITALS
OXYGEN SATURATION: 97 % | DIASTOLIC BLOOD PRESSURE: 105 MMHG | SYSTOLIC BLOOD PRESSURE: 165 MMHG | RESPIRATION RATE: 20 BRPM | WEIGHT: 250 LBS | HEART RATE: 59 BPM | HEIGHT: 71 IN | BODY MASS INDEX: 35 KG/M2 | TEMPERATURE: 98 F

## 2024-02-10 DIAGNOSIS — R82.71 BACTERIA IN URINE: ICD-10-CM

## 2024-02-10 DIAGNOSIS — R10.84 GENERALIZED ABDOMINAL PAIN: Primary | ICD-10-CM

## 2024-02-10 LAB
ALBUMIN SERPL-MCNC: 4 G/DL (ref 3.5–5)
ALBUMIN/GLOB SERPL: 1.2 RATIO (ref 1.1–2)
ALP SERPL-CCNC: 85 UNIT/L (ref 40–150)
ALT SERPL-CCNC: 29 UNIT/L (ref 0–55)
AMORPH PHOS CRY URNS QL MICRO: ABNORMAL /HPF
AMPHET UR QL SCN: NEGATIVE
APPEARANCE UR: CLEAR
AST SERPL-CCNC: 31 UNIT/L (ref 5–34)
BACTERIA #/AREA URNS AUTO: ABNORMAL /HPF
BARBITURATE SCN PRESENT UR: NEGATIVE
BASOPHILS # BLD AUTO: 0.03 X10(3)/MCL
BASOPHILS NFR BLD AUTO: 0.3 %
BENZODIAZ UR QL SCN: NEGATIVE
BILIRUB SERPL-MCNC: 0.9 MG/DL
BILIRUB UR QL STRIP.AUTO: ABNORMAL
BUN SERPL-MCNC: 11.6 MG/DL (ref 8.9–20.6)
C TRACH DNA SPEC QL NAA+PROBE: NOT DETECTED
CALCIUM SERPL-MCNC: 9.9 MG/DL (ref 8.4–10.2)
CANNABINOIDS UR QL SCN: POSITIVE
CHLORIDE SERPL-SCNC: 104 MMOL/L (ref 98–107)
CO2 SERPL-SCNC: 27 MMOL/L (ref 22–29)
COCAINE UR QL SCN: NEGATIVE
COLOR UR AUTO: YELLOW
CREAT SERPL-MCNC: 0.96 MG/DL (ref 0.73–1.18)
EOSINOPHIL # BLD AUTO: 0.04 X10(3)/MCL (ref 0–0.9)
EOSINOPHIL NFR BLD AUTO: 0.4 %
ERYTHROCYTE [DISTWIDTH] IN BLOOD BY AUTOMATED COUNT: 11.8 % (ref 11.5–17)
ETHANOL SERPL-MCNC: <10 MG/DL
FENTANYL UR QL SCN: NEGATIVE
FLUAV AG UPPER RESP QL IA.RAPID: NOT DETECTED
FLUBV AG UPPER RESP QL IA.RAPID: NOT DETECTED
GFR SERPLBLD CREATININE-BSD FMLA CKD-EPI: >60 MLS/MIN/1.73/M2
GLOBULIN SER-MCNC: 3.3 GM/DL (ref 2.4–3.5)
GLUCOSE SERPL-MCNC: 146 MG/DL (ref 74–100)
GLUCOSE UR QL STRIP.AUTO: NEGATIVE
HCT VFR BLD AUTO: 47.5 % (ref 42–52)
HGB BLD-MCNC: 16.3 G/DL (ref 14–18)
IMM GRANULOCYTES # BLD AUTO: 0.03 X10(3)/MCL (ref 0–0.04)
IMM GRANULOCYTES NFR BLD AUTO: 0.3 %
KETONES UR QL STRIP.AUTO: ABNORMAL
LACTATE SERPL-SCNC: 1.2 MMOL/L (ref 0.5–2.2)
LEUKOCYTE ESTERASE UR QL STRIP.AUTO: NEGATIVE
LIPASE SERPL-CCNC: 35 U/L
LYMPHOCYTES # BLD AUTO: 1.39 X10(3)/MCL (ref 0.6–4.6)
LYMPHOCYTES NFR BLD AUTO: 15.1 %
MAGNESIUM SERPL-MCNC: 2 MG/DL (ref 1.6–2.6)
MCH RBC QN AUTO: 28.7 PG (ref 27–31)
MCHC RBC AUTO-ENTMCNC: 34.3 G/DL (ref 33–36)
MCV RBC AUTO: 83.8 FL (ref 80–94)
MDMA UR QL SCN: NEGATIVE
MONOCYTES # BLD AUTO: 0.48 X10(3)/MCL (ref 0.1–1.3)
MONOCYTES NFR BLD AUTO: 5.2 %
N GONORRHOEA DNA SPEC QL NAA+PROBE: NOT DETECTED
NEUTROPHILS # BLD AUTO: 7.24 X10(3)/MCL (ref 2.1–9.2)
NEUTROPHILS NFR BLD AUTO: 78.7 %
NITRITE UR QL STRIP.AUTO: NEGATIVE
NRBC BLD AUTO-RTO: 0 %
OPIATES UR QL SCN: POSITIVE
PCP UR QL: NEGATIVE
PH UR STRIP.AUTO: 7 [PH]
PH UR: 7 [PH] (ref 3–11)
PLATELET # BLD AUTO: 257 X10(3)/MCL (ref 130–400)
PMV BLD AUTO: 9.9 FL (ref 7.4–10.4)
POTASSIUM SERPL-SCNC: 3.5 MMOL/L (ref 3.5–5.1)
PROT SERPL-MCNC: 7.3 GM/DL (ref 6.4–8.3)
PROT UR QL STRIP.AUTO: ABNORMAL
RBC # BLD AUTO: 5.67 X10(6)/MCL (ref 4.7–6.1)
RBC #/AREA URNS AUTO: ABNORMAL /HPF
RBC UR QL AUTO: NEGATIVE
SARS-COV-2 RNA RESP QL NAA+PROBE: NOT DETECTED
SODIUM SERPL-SCNC: 142 MMOL/L (ref 136–145)
SOURCE (OHS): NORMAL
SP GR UR STRIP.AUTO: 1.02 (ref 1–1.03)
SPECIFIC GRAVITY, URINE AUTO (.000) (OHS): 1.02 (ref 1–1.03)
SQUAMOUS #/AREA URNS AUTO: ABNORMAL /HPF
STREP A PCR (OHS): NOT DETECTED
TROPONIN I SERPL-MCNC: <0.01 NG/ML (ref 0–0.04)
UROBILINOGEN UR STRIP-ACNC: 4
WBC # SPEC AUTO: 9.21 X10(3)/MCL (ref 4.5–11.5)
WBC #/AREA URNS AUTO: ABNORMAL /HPF

## 2024-02-10 PROCEDURE — 99285 EMERGENCY DEPT VISIT HI MDM: CPT | Mod: 25

## 2024-02-10 PROCEDURE — 83605 ASSAY OF LACTIC ACID: CPT | Performed by: EMERGENCY MEDICINE

## 2024-02-10 PROCEDURE — 25000003 PHARM REV CODE 250: Performed by: EMERGENCY MEDICINE

## 2024-02-10 PROCEDURE — 96375 TX/PRO/DX INJ NEW DRUG ADDON: CPT

## 2024-02-10 PROCEDURE — 81003 URINALYSIS AUTO W/O SCOPE: CPT | Performed by: EMERGENCY MEDICINE

## 2024-02-10 PROCEDURE — 83690 ASSAY OF LIPASE: CPT | Performed by: EMERGENCY MEDICINE

## 2024-02-10 PROCEDURE — 85025 COMPLETE CBC W/AUTO DIFF WBC: CPT | Performed by: EMERGENCY MEDICINE

## 2024-02-10 PROCEDURE — 84484 ASSAY OF TROPONIN QUANT: CPT | Performed by: EMERGENCY MEDICINE

## 2024-02-10 PROCEDURE — 87491 CHLMYD TRACH DNA AMP PROBE: CPT | Performed by: EMERGENCY MEDICINE

## 2024-02-10 PROCEDURE — 80307 DRUG TEST PRSMV CHEM ANLYZR: CPT | Performed by: EMERGENCY MEDICINE

## 2024-02-10 PROCEDURE — 63600175 PHARM REV CODE 636 W HCPCS: Mod: JZ,JG | Performed by: EMERGENCY MEDICINE

## 2024-02-10 PROCEDURE — 96376 TX/PRO/DX INJ SAME DRUG ADON: CPT

## 2024-02-10 PROCEDURE — 80053 COMPREHEN METABOLIC PANEL: CPT | Performed by: EMERGENCY MEDICINE

## 2024-02-10 PROCEDURE — 83735 ASSAY OF MAGNESIUM: CPT | Performed by: EMERGENCY MEDICINE

## 2024-02-10 PROCEDURE — 25500020 PHARM REV CODE 255: Performed by: EMERGENCY MEDICINE

## 2024-02-10 PROCEDURE — 82077 ASSAY SPEC XCP UR&BREATH IA: CPT | Performed by: EMERGENCY MEDICINE

## 2024-02-10 PROCEDURE — 0240U COVID/FLU A&B PCR: CPT | Performed by: EMERGENCY MEDICINE

## 2024-02-10 PROCEDURE — 96365 THER/PROPH/DIAG IV INF INIT: CPT

## 2024-02-10 PROCEDURE — 87651 STREP A DNA AMP PROBE: CPT | Performed by: EMERGENCY MEDICINE

## 2024-02-10 RX ORDER — KETOROLAC TROMETHAMINE 30 MG/ML
30 INJECTION, SOLUTION INTRAMUSCULAR; INTRAVENOUS
Status: COMPLETED | OUTPATIENT
Start: 2024-02-10 | End: 2024-02-10

## 2024-02-10 RX ORDER — MORPHINE SULFATE 4 MG/ML
4 INJECTION, SOLUTION INTRAMUSCULAR; INTRAVENOUS
Status: COMPLETED | OUTPATIENT
Start: 2024-02-10 | End: 2024-02-10

## 2024-02-10 RX ORDER — ONDANSETRON HYDROCHLORIDE 2 MG/ML
4 INJECTION, SOLUTION INTRAVENOUS
Status: COMPLETED | OUTPATIENT
Start: 2024-02-10 | End: 2024-02-10

## 2024-02-10 RX ORDER — DOXYCYCLINE 100 MG/1
100 CAPSULE ORAL 2 TIMES DAILY
Qty: 14 CAPSULE | Refills: 0 | Status: SHIPPED | OUTPATIENT
Start: 2024-02-10 | End: 2024-02-17

## 2024-02-10 RX ORDER — DOXYCYCLINE 100 MG/1
100 CAPSULE ORAL 2 TIMES DAILY
Qty: 14 CAPSULE | Refills: 0 | Status: SHIPPED | OUTPATIENT
Start: 2024-02-10 | End: 2024-02-10 | Stop reason: SDUPTHER

## 2024-02-10 RX ORDER — DICYCLOMINE HYDROCHLORIDE 10 MG/1
10 CAPSULE ORAL 4 TIMES DAILY PRN
Qty: 120 CAPSULE | Refills: 0 | Status: SHIPPED | OUTPATIENT
Start: 2024-02-10 | End: 2024-03-11

## 2024-02-10 RX ORDER — ONDANSETRON 4 MG/1
8 TABLET, ORALLY DISINTEGRATING ORAL EVERY 6 HOURS PRN
Qty: 10 TABLET | Refills: 0 | Status: SHIPPED | OUTPATIENT
Start: 2024-02-10 | End: 2024-02-10 | Stop reason: SDUPTHER

## 2024-02-10 RX ORDER — PANTOPRAZOLE SODIUM 40 MG/1
40 TABLET, DELAYED RELEASE ORAL DAILY
Qty: 30 TABLET | Refills: 0 | Status: SHIPPED | OUTPATIENT
Start: 2024-02-10 | End: 2024-06-13 | Stop reason: ALTCHOICE

## 2024-02-10 RX ORDER — PANTOPRAZOLE SODIUM 40 MG/1
40 TABLET, DELAYED RELEASE ORAL DAILY
Qty: 30 TABLET | Refills: 0 | Status: SHIPPED | OUTPATIENT
Start: 2024-02-10 | End: 2024-02-10 | Stop reason: SDUPTHER

## 2024-02-10 RX ORDER — ONDANSETRON 4 MG/1
8 TABLET, ORALLY DISINTEGRATING ORAL EVERY 6 HOURS PRN
Qty: 10 TABLET | Refills: 0 | Status: SHIPPED | OUTPATIENT
Start: 2024-02-10 | End: 2024-06-13 | Stop reason: ALTCHOICE

## 2024-02-10 RX ORDER — DICYCLOMINE HYDROCHLORIDE 10 MG/1
10 CAPSULE ORAL 4 TIMES DAILY PRN
Qty: 120 CAPSULE | Refills: 0 | Status: SHIPPED | OUTPATIENT
Start: 2024-02-10 | End: 2024-02-10 | Stop reason: SDUPTHER

## 2024-02-10 RX ADMIN — ONDANSETRON 4 MG: 2 INJECTION INTRAMUSCULAR; INTRAVENOUS at 12:02

## 2024-02-10 RX ADMIN — CEFTRIAXONE SODIUM 1 G: 1 INJECTION, POWDER, FOR SOLUTION INTRAMUSCULAR; INTRAVENOUS at 03:02

## 2024-02-10 RX ADMIN — IOPAMIDOL 100 ML: 755 INJECTION, SOLUTION INTRAVENOUS at 01:02

## 2024-02-10 RX ADMIN — MORPHINE SULFATE 4 MG: 4 INJECTION, SOLUTION INTRAMUSCULAR; INTRAVENOUS at 12:02

## 2024-02-10 RX ADMIN — MORPHINE SULFATE 4 MG: 4 INJECTION, SOLUTION INTRAMUSCULAR; INTRAVENOUS at 02:02

## 2024-02-10 RX ADMIN — KETOROLAC TROMETHAMINE 30 MG: 30 INJECTION, SOLUTION INTRAMUSCULAR; INTRAVENOUS at 12:02

## 2024-02-10 NOTE — ED PROVIDER NOTES
Encounter Date: 2/10/2024       History     Chief Complaint   Patient presents with    Abdominal Pain     Generalized abd pain and vomiting started Tuesday,  pt was seen at [facility] Tuesday but eloped before getting results.      33-year-old male complains of generalized abdominal pain which started 5 days ago.  He states that he went to [facility] and had blood work but left before the CT scan was performed.  Since that time he is continued have abdominal pain, unable to eat, intermittent vomiting.  No URI symptoms.  He has had an appendectomy in the past.        Review of patient's allergies indicates:  No Known Allergies  Past Medical History:   Diagnosis Date    At risk for alteration in comfort     Mixed hyperlipidemia     Obesity, unspecified     Tobacco abuse      Past Surgical History:   Procedure Laterality Date    arm repair Right     LAPAROSCOPIC APPENDECTOMY N/A 10/22/2022    Procedure: APPENDECTOMY, LAPAROSCOPIC;  Surgeon: iVnay Sims Jr., MD;  Location: [facility];  Service: General;  Laterality: N/A;     Family History   Problem Relation Age of Onset    Hypertension Mother     No Known Problems Father      Social History     Tobacco Use    Smoking status: Every Day     Current packs/day: 0.50     Average packs/day: 0.5 packs/day for 17.1 years (8.6 ttl pk-yrs)     Types: Cigarettes     Start date: 2007    Smokeless tobacco: Never   Substance Use Topics    Alcohol use: Not Currently     Comment: occasionally    Drug use: Yes     Types: Marijuana     Comment: 4 blunts per day     Review of Systems   Gastrointestinal:  Positive for abdominal pain, nausea and vomiting.   All other systems reviewed and are negative.      Physical Exam     Initial Vitals [02/10/24 1219]   BP Pulse Resp Temp SpO2   (!) 165/105 60 16 97.9 °F (36.6 °C) 98 %      MAP       --         Physical Exam    Nursing note and vitals reviewed.  Constitutional: Vital signs are normal. He appears well-developed and well-nourished.   HENT:   Head:  Normocephalic and atraumatic.   Mouth/Throat: Oropharynx is clear and moist.   Eyes: Pupils are equal, round, and reactive to light.   Neck: Neck supple. No JVD present.   Cardiovascular:  Normal rate, regular rhythm and normal heart sounds.           Pulmonary/Chest: Breath sounds normal. No respiratory distress.   Abdominal: Abdomen is soft. There is abdominal tenderness.   Mild diffuse tenderness with no rebound or guarding   Musculoskeletal:         General: No edema.      Cervical back: Neck supple. No edema or erythema.     Lymphadenopathy:     He has no cervical adenopathy.   Neurological: He is alert and oriented to person, place, and time. No cranial nerve deficit. GCS score is 15. GCS eye subscore is 4. GCS verbal subscore is 5. GCS motor subscore is 6.   Skin: Skin is warm and dry. Capillary refill takes less than 2 seconds.         ED Course   Procedures  Labs Reviewed   COMPREHENSIVE METABOLIC PANEL - Abnormal; Notable for the following components:       Result Value    Glucose Level 146 (*)     All other components within normal limits   URINALYSIS, REFLEX TO URINE CULTURE - Abnormal; Notable for the following components:    Protein, UA Trace (*)     Ketones, UA Trace (*)     Bilirubin, UA Small (*)     Urobilinogen, UA 4.0 (*)     All other components within normal limits   DRUG SCREEN, URINE (BEAKER) - Abnormal; Notable for the following components:    Cannabinoids, Urine Positive (*)     Opiates, Urine Positive (*)     All other components within normal limits    Narrative:     Cut off concentrations:    Amphetamines - 1000 ng/ml  Barbiturates - 200 ng/ml  Benzodiazepine - 200 ng/ml  Cannabinoids (THC) - 50 ng/ml  Cocaine - 300 ng/ml  Fentanyl - 1.0 ng/ml  MDMA - 500 ng/ml  Opiates - 300 ng/ml   Phencyclidine (PCP) - 25 ng/ml    Specimen submitted for drug analysis and tested for pH and specific gravity in order to evaluate sample integrity. Suspect tampering if specific gravity is <1.003 and/or pH  is not within the range of 4.5 - 8.0  False negatives may result form substances such as bleach added to urine.  False positives may result for the presence of a substance with similar chemical structure to the drug or its metabolite.    This test provides only a PRELIMINARY analytical test result. A more specific alternate chemical method must be used in order to obtain a confirmed analytical result. Gas chromatography/mass spectrometry (GC/MS) is the preferred confirmatory method. Other chemical confirmation methods are available. Clinical consideration and professional judgement should be applied to any drug of abuse test result, particularly when preliminary positive results are used.    Positive results will be confirmed only at the physicians request. Unconfirmed screening results are to be used only for medical purposes (treatment).        URINALYSIS, MICROSCOPIC - Abnormal; Notable for the following components:    Bacteria, UA Moderate (*)     Amorphous Phosphate Crystals, UA Moderate (*)     All other components within normal limits   MAGNESIUM - Normal   LIPASE - Normal   LACTIC ACID, PLASMA - Normal   TROPONIN I - Normal   COVID/FLU A&B PCR - Normal    Narrative:     The Xpert Xpress SARS-CoV-2/FLU/RSV plus is a rapid, multiplexed real-time PCR test intended for the simultaneous qualitative detection and differentiation of SARS-CoV-2, Influenza A, Influenza B, and respiratory syncytial virus (RSV) viral RNA in either nasopharyngeal swab or nasal swab specimens.         STREP GROUP A BY PCR - Normal    Narrative:     The Xpert Xpress Strep A test is a rapid, qualitative in vitro diagnostic test for the detection of Streptococcus pyogenes (Group A ß-hemolytic Streptococcus, Strep A) in throat swab specimens from patients with signs and symptoms of pharyngitis.     ALCOHOL,MEDICAL (ETHANOL) - Normal   CHLAMYDIA/GONORRHOEAE(GC), PCR   CBC WITH DIFFERENTIAL          Imaging Results              CT Abdomen  Pelvis With IV Contrast NO Oral Contrast (Final result)  Result time 02/10/24 14:34:53      Final result by Martell Tellez MD (02/10/24 14:34:53)                   Impression:      No abdominopelvic visceral acute findings identified.      Electronically signed by: Martell Tellez  Date:    02/10/2024  Time:    14:34               Narrative:    EXAMINATION:  CT ABDOMEN PELVIS WITH IV CONTRAST    CLINICAL HISTORY:  Abdominal pain, acute, nonlocalized;    TECHNIQUE:  Multidetector axial images were obtained of the abdomen and pelvis following the administration of IV contrast. Oral contrast was not administered.    Dose length product of 430 mGycm. Automated exposure control was utilized to minimize radiation dose.    COMPARISON:  April 24, 2023.    FINDINGS:  Included portion of the lungs are without suspicious nodularity, acute air space infiltrates or fluid within the pleural spaces.  There is bilateral gynecomastia.    Hepatic volume and attenuation is unremarkable. Gallbladder wall is not thickened and there is no intra luminal calcified calculus. No biliary dilation identified. Pancreatic unremarkable attenuation without acute peripancreatic phlegmons. Main pancreatic duct is not dilated. Spleen is of normal size without focal lesion.    The adrenal glands appear within normal limits. The kidneys are unremarkable in size and contour. No solid or cystic renal lesion identified. There is no hydronephrosis. No perinephric fluid strandings or collections identified.    There is no apparent gastric mural thickening.  There is no abnormal dilatation of small bowel there is no focal or generalized mural thickening.  Patient is status post appendectomy.  Colon is nondistended there are no pericolonic acute standings.  No bowel obstruction no free fluid    Urinary bladder is entirely decompressed.  There is no pelvic free fluid.    No acute or otherwise osseous abnormality identified.                                        Medications   cefTRIAXone (Rocephin) 1 g in dextrose 5 % in water (D5W) 100 mL IVPB (MB+) (0 g Intravenous Stopped 2/10/24 1601)   ketorolac injection 30 mg (30 mg Intravenous Given 2/10/24 1248)   morphine injection 4 mg (4 mg Intravenous Given 2/10/24 1248)   ondansetron injection 4 mg (4 mg Intravenous Given 2/10/24 1249)   morphine injection 4 mg (4 mg Intravenous Given 2/10/24 1402)   iopamidoL (ISOVUE-370) injection 100 mL (100 mLs Intravenous Given 2/10/24 1359)     Medical Decision Making  See HPI for narrative    Differential diagnosis includes but is not limited to gastroenteritis, gastritis, cholecystitis, pancreatitis, cannabis hyperemesis syndrome, UTI, kidney stone, bowel obstruction    Amount and/or Complexity of Data Reviewed  Labs: ordered.  Radiology: ordered.  Discussion of management or test interpretation with external provider(s): Patient was seen and evaluated in the emergency department with history, physical exam, lab work, CT scan.  His workup is benign other than bacteria in his urine.  I have treated him with Rocephin and doxycycline for this.  We will get a urine culture and also test for gonorrhea and chlamydia.  I recommended that he discontinue marijuana in case that is the causes abdominal pain and vomiting.  It certainly could be a virus as well so recommended good handwashing and not allowing anyone to eat or drink after him.  He should follow-up with his PCP for further care.  Blood pressure is somewhat elevated and this was discussed as well.    Risk  Prescription drug management.               ED Course as of 02/10/24 1548   Sat Feb 10, 2024   1349 Patient looks a little bit more comfortable but states he is still having severe generalized abdominal pain.  I will order a 2nd dose of morphine.  He is going for his CT scan right now. [SH]   1546 Patient has a daily heavy marijuana smoker.  I recommend that he considered discontinuing this as he may have cannabis hyperemesis  syndrome.  He also could be having abdominal pain from gastroenteritis or UTI.  I have treated him for UTI and he does not believe he has risk factors for STD.  I have given him symptomatic medications and recommend that he follow-up with his PCP for further care. []      ED Course User Index  [SH] Kyara Andrews MD                           Clinical Impression:  Final diagnoses:  [R10.84] Generalized abdominal pain (Primary)  [R82.71] Bacteria in urine          ED Disposition Condition    Discharge Stable          ED Prescriptions       Medication Sig Dispense Start Date End Date Auth. Provider    dicyclomine (BENTYL) 10 MG capsule Take 1 capsule (10 mg total) by mouth 4 (four) times daily as needed (abdominal pain). 120 capsule 2/10/2024 3/11/2024 Kyara Andrews MD    ondansetron (ZOFRAN-ODT) 4 MG TbDL Take 2 tablets (8 mg total) by mouth every 6 (six) hours as needed (nausea). 10 tablet 2/10/2024 -- Kyara Andrews MD    doxycycline (VIBRAMYCIN) 100 MG Cap Take 1 capsule (100 mg total) by mouth 2 (two) times daily. for 7 days 14 capsule 2/10/2024 2/17/2024 Kyara Andrews MD    pantoprazole (PROTONIX) 40 MG tablet Take 1 tablet (40 mg total) by mouth once daily. 30 tablet 2/10/2024 -- Kyara Andrews MD          Follow-up Information       Follow up With Specialties Details Why Contact Info    Aida Jackson FNP Nurse Practitioner Schedule an appointment as soon as possible for a visit   [address] [facility] [address] [phone]               Kyara Andrews MD  02/10/24 2235

## 2024-06-12 ENCOUNTER — LAB VISIT (OUTPATIENT)
Dept: LAB | Facility: HOSPITAL | Age: 34
End: 2024-06-12
Attending: NURSE PRACTITIONER
Payer: MEDICAID

## 2024-06-12 DIAGNOSIS — E78.2 MIXED HYPERLIPIDEMIA: ICD-10-CM

## 2024-06-12 DIAGNOSIS — Z00.00 WELLNESS EXAMINATION: ICD-10-CM

## 2024-06-12 LAB
25(OH)D3+25(OH)D2 SERPL-MCNC: 14 NG/ML (ref 30–80)
ALBUMIN SERPL-MCNC: 4.1 G/DL (ref 3.5–5)
ALBUMIN/GLOB SERPL: 1.1 RATIO (ref 1.1–2)
ALP SERPL-CCNC: 79 UNIT/L (ref 40–150)
ALT SERPL-CCNC: 13 UNIT/L (ref 0–55)
ANION GAP SERPL CALC-SCNC: 9 MEQ/L
AST SERPL-CCNC: 22 UNIT/L (ref 5–34)
BACTERIA #/AREA URNS AUTO: ABNORMAL /HPF
BASOPHILS # BLD AUTO: 0.03 X10(3)/MCL
BASOPHILS NFR BLD AUTO: 0.4 %
BILIRUB SERPL-MCNC: 1.2 MG/DL
BILIRUB UR QL STRIP.AUTO: NEGATIVE
BUN SERPL-MCNC: 9 MG/DL (ref 8.9–20.6)
CALCIUM SERPL-MCNC: 10 MG/DL (ref 8.4–10.2)
CHLORIDE SERPL-SCNC: 106 MMOL/L (ref 98–107)
CHOLEST SERPL-MCNC: 278 MG/DL
CHOLEST/HDLC SERPL: 7 {RATIO} (ref 0–5)
CLARITY UR: CLEAR
CO2 SERPL-SCNC: 25 MMOL/L (ref 22–29)
COLOR UR AUTO: YELLOW
CREAT SERPL-MCNC: 1.06 MG/DL (ref 0.73–1.18)
CREAT/UREA NIT SERPL: 8
EOSINOPHIL # BLD AUTO: 0.1 X10(3)/MCL (ref 0–0.9)
EOSINOPHIL NFR BLD AUTO: 1.2 %
ERYTHROCYTE [DISTWIDTH] IN BLOOD BY AUTOMATED COUNT: 12.2 % (ref 11.5–17)
EST. AVERAGE GLUCOSE BLD GHB EST-MCNC: 108.3 MG/DL
GFR SERPLBLD CREATININE-BSD FMLA CKD-EPI: >60 ML/MIN/1.73/M2
GLOBULIN SER-MCNC: 3.7 GM/DL (ref 2.4–3.5)
GLUCOSE SERPL-MCNC: 93 MG/DL (ref 74–100)
GLUCOSE UR QL STRIP: NORMAL
HBA1C MFR BLD: 5.4 %
HCT VFR BLD AUTO: 47.6 % (ref 42–52)
HDLC SERPL-MCNC: 42 MG/DL (ref 35–60)
HGB BLD-MCNC: 15.9 G/DL (ref 14–18)
HGB UR QL STRIP: ABNORMAL
HYALINE CASTS #/AREA URNS LPF: ABNORMAL /LPF
IMM GRANULOCYTES # BLD AUTO: 0.03 X10(3)/MCL (ref 0–0.04)
IMM GRANULOCYTES NFR BLD AUTO: 0.4 %
KETONES UR QL STRIP: ABNORMAL
LDLC SERPL CALC-MCNC: 216 MG/DL (ref 50–140)
LEUKOCYTE ESTERASE UR QL STRIP: 25
LYMPHOCYTES # BLD AUTO: 2.17 X10(3)/MCL (ref 0.6–4.6)
LYMPHOCYTES NFR BLD AUTO: 26.1 %
MCH RBC QN AUTO: 28.1 PG (ref 27–31)
MCHC RBC AUTO-ENTMCNC: 33.4 G/DL (ref 33–36)
MCV RBC AUTO: 84.1 FL (ref 80–94)
MONOCYTES # BLD AUTO: 0.49 X10(3)/MCL (ref 0.1–1.3)
MONOCYTES NFR BLD AUTO: 5.9 %
MUCOUS THREADS URNS QL MICRO: ABNORMAL /LPF
NEUTROPHILS # BLD AUTO: 5.5 X10(3)/MCL (ref 2.1–9.2)
NEUTROPHILS NFR BLD AUTO: 66 %
NITRITE UR QL STRIP: NEGATIVE
NRBC BLD AUTO-RTO: 0 %
PH UR STRIP: 6 [PH]
PLATELET # BLD AUTO: 227 X10(3)/MCL (ref 130–400)
PMV BLD AUTO: 10.4 FL (ref 7.4–10.4)
POTASSIUM SERPL-SCNC: 3.2 MMOL/L (ref 3.5–5.1)
PROT SERPL-MCNC: 7.8 GM/DL (ref 6.4–8.3)
PROT UR QL STRIP: ABNORMAL
RBC # BLD AUTO: 5.66 X10(6)/MCL (ref 4.7–6.1)
RBC #/AREA URNS AUTO: ABNORMAL /HPF
SODIUM SERPL-SCNC: 140 MMOL/L (ref 136–145)
SP GR UR STRIP.AUTO: 1.03 (ref 1–1.03)
SQUAMOUS #/AREA URNS LPF: ABNORMAL /HPF
TRIGL SERPL-MCNC: 98 MG/DL (ref 34–140)
TSH SERPL-ACNC: 0.96 UIU/ML (ref 0.35–4.94)
UROBILINOGEN UR STRIP-ACNC: ABNORMAL
VLDLC SERPL CALC-MCNC: 20 MG/DL
WBC # SPEC AUTO: 8.32 X10(3)/MCL (ref 4.5–11.5)
WBC #/AREA URNS AUTO: ABNORMAL /HPF

## 2024-06-12 PROCEDURE — 80061 LIPID PANEL: CPT

## 2024-06-12 PROCEDURE — 36415 COLL VENOUS BLD VENIPUNCTURE: CPT

## 2024-06-12 PROCEDURE — 80053 COMPREHEN METABOLIC PANEL: CPT

## 2024-06-12 PROCEDURE — 85025 COMPLETE CBC W/AUTO DIFF WBC: CPT

## 2024-06-12 PROCEDURE — 83036 HEMOGLOBIN GLYCOSYLATED A1C: CPT

## 2024-06-12 PROCEDURE — 82306 VITAMIN D 25 HYDROXY: CPT

## 2024-06-12 PROCEDURE — 84443 ASSAY THYROID STIM HORMONE: CPT

## 2024-06-12 PROCEDURE — 81001 URINALYSIS AUTO W/SCOPE: CPT

## 2024-06-13 ENCOUNTER — OFFICE VISIT (OUTPATIENT)
Dept: INTERNAL MEDICINE | Facility: CLINIC | Age: 34
End: 2024-06-13
Payer: MEDICAID

## 2024-06-13 VITALS
DIASTOLIC BLOOD PRESSURE: 68 MMHG | TEMPERATURE: 99 F | BODY MASS INDEX: 35.7 KG/M2 | RESPIRATION RATE: 16 BRPM | HEART RATE: 58 BPM | SYSTOLIC BLOOD PRESSURE: 113 MMHG | HEIGHT: 71 IN | WEIGHT: 255 LBS

## 2024-06-13 DIAGNOSIS — M17.11 PRIMARY OSTEOARTHRITIS OF RIGHT KNEE: ICD-10-CM

## 2024-06-13 DIAGNOSIS — Z23 IMMUNIZATION DUE: ICD-10-CM

## 2024-06-13 DIAGNOSIS — E55.9 VITAMIN D DEFICIENCY: ICD-10-CM

## 2024-06-13 DIAGNOSIS — F17.210 CIGARETTE NICOTINE DEPENDENCE WITHOUT COMPLICATION: Chronic | ICD-10-CM

## 2024-06-13 DIAGNOSIS — B37.2 SKIN CANDIDIASIS: ICD-10-CM

## 2024-06-13 DIAGNOSIS — Z00.00 WELLNESS EXAMINATION: Primary | ICD-10-CM

## 2024-06-13 DIAGNOSIS — E78.2 MIXED HYPERLIPIDEMIA: ICD-10-CM

## 2024-06-13 PROCEDURE — 99395 PREV VISIT EST AGE 18-39: CPT | Mod: S$PBB,25,, | Performed by: NURSE PRACTITIONER

## 2024-06-13 PROCEDURE — 3078F DIAST BP <80 MM HG: CPT | Mod: CPTII,,, | Performed by: NURSE PRACTITIONER

## 2024-06-13 PROCEDURE — 99214 OFFICE O/P EST MOD 30 MIN: CPT | Mod: PBBFAC,25 | Performed by: NURSE PRACTITIONER

## 2024-06-13 PROCEDURE — 90677 PCV20 VACCINE IM: CPT | Mod: PBBFAC

## 2024-06-13 PROCEDURE — 3074F SYST BP LT 130 MM HG: CPT | Mod: CPTII,,, | Performed by: NURSE PRACTITIONER

## 2024-06-13 PROCEDURE — 99212 OFFICE O/P EST SF 10 MIN: CPT | Mod: 25,S$PBB,, | Performed by: NURSE PRACTITIONER

## 2024-06-13 PROCEDURE — 90471 IMMUNIZATION ADMIN: CPT | Mod: PBBFAC

## 2024-06-13 PROCEDURE — 1159F MED LIST DOCD IN RCRD: CPT | Mod: CPTII,,, | Performed by: NURSE PRACTITIONER

## 2024-06-13 PROCEDURE — 3044F HG A1C LEVEL LT 7.0%: CPT | Mod: CPTII,,, | Performed by: NURSE PRACTITIONER

## 2024-06-13 PROCEDURE — 1160F RVW MEDS BY RX/DR IN RCRD: CPT | Mod: CPTII,,, | Performed by: NURSE PRACTITIONER

## 2024-06-13 PROCEDURE — 3008F BODY MASS INDEX DOCD: CPT | Mod: CPTII,,, | Performed by: NURSE PRACTITIONER

## 2024-06-13 PROCEDURE — 99406 BEHAV CHNG SMOKING 3-10 MIN: CPT | Mod: S$PBB,,, | Performed by: NURSE PRACTITIONER

## 2024-06-13 RX ORDER — ATORVASTATIN CALCIUM 20 MG/1
20 TABLET, FILM COATED ORAL NIGHTLY
Qty: 90 TABLET | Refills: 0 | Status: SHIPPED | OUTPATIENT
Start: 2024-06-13 | End: 2024-06-13

## 2024-06-13 RX ORDER — ERGOCALCIFEROL 1.25 MG/1
50000 CAPSULE ORAL
Qty: 8 CAPSULE | Refills: 0 | Status: SHIPPED | OUTPATIENT
Start: 2024-06-13 | End: 2024-08-02

## 2024-06-13 RX ORDER — DICLOFENAC SODIUM 75 MG/1
75 TABLET, DELAYED RELEASE ORAL 2 TIMES DAILY PRN
Qty: 60 TABLET | Refills: 5 | Status: SHIPPED | OUTPATIENT
Start: 2024-06-13 | End: 2024-12-10

## 2024-06-13 RX ORDER — ATORVASTATIN CALCIUM 40 MG/1
40 TABLET, FILM COATED ORAL NIGHTLY
Qty: 90 TABLET | Refills: 0 | Status: SHIPPED | OUTPATIENT
Start: 2024-06-13 | End: 2024-09-11

## 2024-06-13 RX ORDER — CLOTRIMAZOLE AND BETAMETHASONE DIPROPIONATE 10; .64 MG/G; MG/G
CREAM TOPICAL 2 TIMES DAILY
Qty: 15 G | Refills: 6 | Status: SHIPPED | OUTPATIENT
Start: 2024-06-13 | End: 2024-06-27

## 2024-06-13 RX ORDER — DICLOFENAC SODIUM 10 MG/G
2 GEL TOPICAL 4 TIMES DAILY PRN
Qty: 100 G | Refills: 6 | Status: SHIPPED | OUTPATIENT
Start: 2024-06-13 | End: 2024-09-11

## 2024-06-13 RX ADMIN — PNEUMOCOCCAL 20-VALENT CONJUGATE VACCINE 0.5 ML
2.2; 2.2; 2.2; 2.2; 2.2; 2.2; 2.2; 2.2; 2.2; 2.2; 2.2; 2.2; 2.2; 2.2; 2.2; 2.2; 4.4; 2.2; 2.2; 2.2 INJECTION, SUSPENSION INTRAMUSCULAR at 10:06

## 2024-06-13 NOTE — ASSESSMENT & PLAN NOTE
FLP Elevated  Increase RX atorvastatin to 20 mg q hs  2 month f/u with labs    Follow a low cholesterol, low saturated fat diet with less than 200 mg of cholesterol a day.   Avoid fried foods and high saturated fats (rodríguez, sausage, cookies, cakes, chips, cheese, whole milk, butter, mayonnaise, creamy dressings, gravy, stew, gumbo, boudin, cracklins and cream sauces).  Add flax seed or fish oil supplements to diet.   Increase dietary fiber.   Regular exercise improves cholesterol levels.  Physical activity 5 times a week for 30 minutes per day (or 150 minutes per week).   Stressed importance of dietary modifications.    Lab Results   Component Value Date    CHOL 278 (H) 06/12/2024     Lab Results   Component Value Date    HDL 42 06/12/2024     Lab Results   Component Value Date    TRIG 98 06/12/2024     Lab Results   Component Value Date    VLDL 20 06/12/2024     Lab Results   Component Value Date    .00 (H) 06/12/2024

## 2024-06-13 NOTE — ASSESSMENT & PLAN NOTE
Educated on increasing foods high in Vitamin D such as fish oil, cod liver oil, salmon, milk fortified with vitamin D.  RX Vitamin D3 85414 IU weekly x 12 weeks.  Complete entire 12 weeks of Vitamin D prescription.  After completion of prescription (12 weeks/3 months), begin taking Vitamin D 2000 I.U. tablets daily (purchase over the counter).  Repeat Vitamin D level as ordered.    Lab Results   Component Value Date    [id number] 14 (L) 06/12/2024

## 2024-06-13 NOTE — PROGRESS NOTES
SUHAIL Crowder   [facility]  [address] [facility] [address]      PATIENT NAME: Figueroa Murcia  : 1990  DATE: 24  MRN: [id number]      Patient PCP Information       Provider PCP Type    SUHAIL Crowder General            Reason for Visit / Chief Complaint: Health Maintenance (Lab review )       History of Present Illness / Problem Focused Workflow     Figueroa Murcia presents to the clinic with Health Maintenance (Lab review )     32 yo AAM here today for f/u. PMH Tobacco use (1/2 ppd) + THC.      2024  Pt here today for yearly wellness OV; labs completed and reviewed. Discussed with pt very elevated FLP; discussed conservative treatment measures for HLD including diet and exercise. Will start pt today on atorvastatin 20 mg q hs for RX management. Will f/u with pt in about 2 months via virtual with labs for eval. Pt urine sample abnormal as well, recent CT of the abdomen was negative for any urinary or renal findings. Will repeat UA in 2 months as well with labs or prn if pt has any acute issues. Pt reports he does sweat often, reports area under the breast folds becomes irritated and red, will send cream for prn use, discussed ways to keep area dry, pt verbalized understanding.   Agreeable to PCV 20 today.  topics reviewed and up to date.           Review of Systems     Review of Systems   Constitutional: Negative.    HENT: Negative.     Eyes: Negative.    Respiratory: Negative.     Cardiovascular: Negative.    Gastrointestinal: Negative.    Endocrine: Negative.    Genitourinary: Negative.    Neurological: Negative.    Psychiatric/Behavioral: Negative.           Medications and Allergies     Medications  Current Outpatient Medications   Medication Instructions    atorvastatin (LIPITOR) 40 mg, Oral, Nightly, For High Cholesterol    clotrimazole-betamethasone 1-0.05% (LOTRISONE) cream Topical (Top), 2 times daily, Under Breast  "Tissue    diclofenac (VOLTAREN) 75 mg, Oral, 2 times daily PRN    diclofenac sodium (VOLTAREN) 2 g, Topical (Top), 4 times daily PRN    ergocalciferol (ERGOCALCIFEROL) 50,000 Units, Oral, Every 7 days, For Low Vitamin D. Start over the counter once completed.    prochlorperazine (COMPAZINE) 10 mg, Oral, Every 6 hours PRN         Allergies  Review of patient's allergies indicates:  No Known Allergies    Physical Examination     Visit Vitals  /68   Pulse (!) 58   Temp 98.6 °F (37 °C)   Resp 16   Ht 5' 11" (1.803 m)   Wt 115.7 kg (255 lb)   BMI 35.57 kg/m²       Physical Exam  Vitals reviewed.   Constitutional:       Appearance: Normal appearance. He is normal weight.   HENT:      Head: Normocephalic.   Cardiovascular:      Rate and Rhythm: Normal rate and regular rhythm.      Pulses: Normal pulses.      Heart sounds: Normal heart sounds.   Pulmonary:      Effort: Pulmonary effort is normal.      Breath sounds: Normal breath sounds.   Abdominal:      General: Abdomen is flat.      Palpations: Abdomen is soft.   Musculoskeletal:         General: Normal range of motion.      Cervical back: Normal range of motion.   Skin:     General: Skin is warm and dry.   Neurological:      Mental Status: He is alert.   Psychiatric:         Mood and Affect: Mood normal.           Results     Lab Results   Component Value Date    WBC 8.32 06/12/2024    RBC 5.66 06/12/2024    HGB 15.9 06/12/2024    HCT 47.6 06/12/2024    MCV 84.1 06/12/2024    MCH 28.1 06/12/2024    MCHC 33.4 06/12/2024    RDW 12.2 06/12/2024     06/12/2024    MPV 10.4 06/12/2024     CMP  Sodium   Date Value Ref Range Status   06/12/2024 140 136 - 145 mmol/L Final     Potassium   Date Value Ref Range Status   06/12/2024 3.2 (L) 3.5 - 5.1 mmol/L Final     Chloride   Date Value Ref Range Status   06/12/2024 106 98 - 107 mmol/L Final     CO2   Date Value Ref Range Status   06/12/2024 25 22 - 29 mmol/L Final     Blood Urea Nitrogen   Date Value Ref Range Status "   06/12/2024 9.0 8.9 - 20.6 mg/dL Final     Creatinine   Date Value Ref Range Status   06/12/2024 1.06 0.73 - 1.18 mg/dL Final     Calcium   Date Value Ref Range Status   06/12/2024 10.0 8.4 - 10.2 mg/dL Final     Albumin   Date Value Ref Range Status   06/12/2024 4.1 3.5 - 5.0 g/dL Final     Bilirubin Total   Date Value Ref Range Status   06/12/2024 1.2 <=1.5 mg/dL Final     ALP   Date Value Ref Range Status   06/12/2024 79 40 - 150 unit/L Final     AST   Date Value Ref Range Status   06/12/2024 22 5 - 34 unit/L Final     ALT   Date Value Ref Range Status   06/12/2024 13 0 - 55 unit/L Final     Estimated GFR-Non    Date Value Ref Range Status   03/29/2022 >60       Lab Results   Component Value Date    CHOL 278 (H) 06/12/2024     Lab Results   Component Value Date    HDL 42 06/12/2024     Lab Results   Component Value Date    TRIG 98 06/12/2024     Lab Results   Component Value Date    VLDL 20 06/12/2024     Lab Results   Component Value Date    .00 (H) 06/12/2024     Lab Results   Component Value Date    TSH 0.958 06/12/2024         Assessment        ICD-10-CM ICD-9-CM   1. Wellness examination  Z00.00 V70.0   2. Mixed hyperlipidemia  E78.2 272.2   3. Immunization due  Z23 V05.9   4. Vitamin D deficiency  E55.9 268.9   5. Skin candidiasis  B37.2 112.3   6. Primary osteoarthritis of right knee  M17.11 715.16   7. Cigarette nicotine dependence without complication  F17.210 305.1        Plan      Problem List Items Addressed This Visit          Cardiac/Vascular    Mixed hyperlipidemia    Current Assessment & Plan     FLP Elevated  Increase RX atorvastatin to 20 mg q hs  2 month f/u with labs    Follow a low cholesterol, low saturated fat diet with less than 200 mg of cholesterol a day.   Avoid fried foods and high saturated fats (rodríguez, sausage, cookies, cakes, chips, cheese, whole milk, butter, mayonnaise, creamy dressings, gravy, stew, gumbo, boudin, cracklins and cream sauces).  Add flax  seed or fish oil supplements to diet.   Increase dietary fiber.   Regular exercise improves cholesterol levels.  Physical activity 5 times a week for 30 minutes per day (or 150 minutes per week).   Stressed importance of dietary modifications.    Lab Results   Component Value Date    CHOL 278 (H) 06/12/2024     Lab Results   Component Value Date    HDL 42 06/12/2024     Lab Results   Component Value Date    TRIG 98 06/12/2024     Lab Results   Component Value Date    VLDL 20 06/12/2024     Lab Results   Component Value Date    .00 (H) 06/12/2024              Relevant Medications    atorvastatin (LIPITOR) 40 MG tablet    Other Relevant Orders    Basic Metabolic Panel    Urinalysis, Reflex to Urine Culture    Lipid Panel       Endocrine    Vitamin D deficiency    Current Assessment & Plan     Educated on increasing foods high in Vitamin D such as fish oil, cod liver oil, salmon, milk fortified with vitamin D.  RX Vitamin D3 64328 IU weekly x 12 weeks.  Complete entire 12 weeks of Vitamin D prescription.  After completion of prescription (12 weeks/3 months), begin taking Vitamin D 2000 I.U. tablets daily (purchase over the counter).  Repeat Vitamin D level as ordered.    Lab Results   Component Value Date    [id number] 14 (L) 06/12/2024              Relevant Medications    ergocalciferol (ERGOCALCIFEROL) 50,000 unit Cap       Other    Cigarette nicotine dependence without complication (Chronic)    Current Assessment & Plan     Dangers of cigarette smoking were reviewed with patient in detail. Patient was Counseled for 3-10 minutes. Nicotine replacement options were discussed. Nicotine replacement was discussed- not prescribed per patient's request         Wellness examination - Primary    Current Assessment & Plan     Pt wellness visit completed today with appropriate lab work.   HM Topics Reviewed / Updated  Immunizations Discussed  Dicussed Healthy Diet &   Encouraged to exercise 3 x weekly  Increase Water  Intake  Eat more fruits and vegetables  Avoid soda & alcohol            Other Visit Diagnoses       Immunization due        Relevant Medications    pneumoc 20-gaby conj-dip cr(PF) (PREVNAR-20 (PF)) injection Syrg 0.5 mL (Completed)    Skin candidiasis        Relevant Medications    clotrimazole-betamethasone 1-0.05% (LOTRISONE) cream    Primary osteoarthritis of right knee        Relevant Medications    diclofenac sodium (VOLTAREN) 1 % Gel    diclofenac (VOLTAREN) 75 MG EC tablet    Other Relevant Orders    Ambulatory referral/consult to Orthopedics            No future appointments.       Follow up in about 2 months (around 8/14/2024) for Established Virtual Visit - Cholesterol F/U W Labs.      Signature:     [facility]  [address] [facility] [address]    Date of encounter: 6/13/24

## 2024-06-18 ENCOUNTER — OFFICE VISIT (OUTPATIENT)
Dept: URGENT CARE | Facility: CLINIC | Age: 34
End: 2024-06-18
Payer: MEDICAID

## 2024-06-18 ENCOUNTER — HOSPITAL ENCOUNTER (EMERGENCY)
Facility: HOSPITAL | Age: 34
Discharge: HOME | End: 2024-06-18
Attending: EMERGENCY MEDICINE
Payer: MEDICAID

## 2024-06-18 VITALS
HEIGHT: 71 IN | OXYGEN SATURATION: 97 % | TEMPERATURE: 98 F | HEART RATE: 44 BPM | SYSTOLIC BLOOD PRESSURE: 158 MMHG | WEIGHT: 255.19 LBS | DIASTOLIC BLOOD PRESSURE: 82 MMHG | BODY MASS INDEX: 35.73 KG/M2 | RESPIRATION RATE: 16 BRPM

## 2024-06-18 VITALS
WEIGHT: 255 LBS | HEART RATE: 58 BPM | BODY MASS INDEX: 35.57 KG/M2 | TEMPERATURE: 98 F | SYSTOLIC BLOOD PRESSURE: 132 MMHG | OXYGEN SATURATION: 98 % | DIASTOLIC BLOOD PRESSURE: 76 MMHG | RESPIRATION RATE: 18 BRPM

## 2024-06-18 DIAGNOSIS — R11.2 NAUSEA AND VOMITING, UNSPECIFIED VOMITING TYPE: ICD-10-CM

## 2024-06-18 DIAGNOSIS — R11.2 NAUSEA AND VOMITING, UNSPECIFIED VOMITING TYPE: Primary | ICD-10-CM

## 2024-06-18 DIAGNOSIS — R82.90 ABNORMAL URINE FINDINGS: ICD-10-CM

## 2024-06-18 DIAGNOSIS — R10.84 GENERALIZED ABDOMINAL PAIN: Primary | ICD-10-CM

## 2024-06-18 DIAGNOSIS — F12.90 MARIJUANA USE: ICD-10-CM

## 2024-06-18 LAB
ALBUMIN SERPL-MCNC: 4.2 G/DL (ref 3.5–5)
ALBUMIN/GLOB SERPL: 1.1 RATIO (ref 1.1–2)
ALP SERPL-CCNC: 85 UNIT/L (ref 40–150)
ALT SERPL-CCNC: 18 UNIT/L (ref 0–55)
AMPHET UR QL SCN: NEGATIVE
ANION GAP SERPL CALC-SCNC: 11 MEQ/L
AST SERPL-CCNC: 21 UNIT/L (ref 5–34)
BACTERIA #/AREA URNS AUTO: ABNORMAL /HPF
BACTERIA #/AREA URNS AUTO: ABNORMAL /HPF
BARBITURATE SCN PRESENT UR: NEGATIVE
BASOPHILS # BLD AUTO: 0.03 X10(3)/MCL
BASOPHILS NFR BLD AUTO: 0.3 %
BENZODIAZ UR QL SCN: NEGATIVE
BILIRUB SERPL-MCNC: 0.6 MG/DL
BILIRUB UR QL STRIP.AUTO: NEGATIVE
BILIRUB UR QL STRIP.AUTO: NEGATIVE
BILIRUB UR QL STRIP: POSITIVE
BUN SERPL-MCNC: 7.8 MG/DL (ref 8.9–20.6)
C TRACH DNA SPEC QL NAA+PROBE: NOT DETECTED
CALCIUM SERPL-MCNC: 10.1 MG/DL (ref 8.4–10.2)
CANNABINOIDS UR QL SCN: POSITIVE
CHLORIDE SERPL-SCNC: 105 MMOL/L (ref 98–107)
CLARITY UR: CLEAR
CLARITY UR: CLEAR
CO2 SERPL-SCNC: 25 MMOL/L (ref 22–29)
COCAINE UR QL SCN: NEGATIVE
COLOR UR AUTO: YELLOW
COLOR UR AUTO: YELLOW
CREAT SERPL-MCNC: 0.93 MG/DL (ref 0.73–1.18)
CREAT/UREA NIT SERPL: 8
EOSINOPHIL # BLD AUTO: 0 X10(3)/MCL (ref 0–0.9)
EOSINOPHIL NFR BLD AUTO: 0 %
ERYTHROCYTE [DISTWIDTH] IN BLOOD BY AUTOMATED COUNT: 12.4 % (ref 11.5–17)
FENTANYL UR QL SCN: NEGATIVE
GFR SERPLBLD CREATININE-BSD FMLA CKD-EPI: >60 ML/MIN/1.73/M2
GLOBULIN SER-MCNC: 3.8 GM/DL (ref 2.4–3.5)
GLUCOSE SERPL-MCNC: 147 MG/DL (ref 74–100)
GLUCOSE UR QL STRIP: ABNORMAL
GLUCOSE UR QL STRIP: NEGATIVE
GLUCOSE UR QL STRIP: NORMAL
HCT VFR BLD AUTO: 44.7 % (ref 42–52)
HGB BLD-MCNC: 15.3 G/DL (ref 14–18)
HGB UR QL STRIP: ABNORMAL
HGB UR QL STRIP: NEGATIVE
HOLD SPECIMEN: NORMAL
HYALINE CASTS #/AREA URNS LPF: ABNORMAL /LPF
HYALINE CASTS #/AREA URNS LPF: ABNORMAL /LPF
IMM GRANULOCYTES # BLD AUTO: 0.04 X10(3)/MCL (ref 0–0.04)
IMM GRANULOCYTES NFR BLD AUTO: 0.3 %
KETONES UR QL STRIP: ABNORMAL
KETONES UR QL STRIP: ABNORMAL
KETONES UR QL STRIP: POSITIVE
LEUKOCYTE ESTERASE UR QL STRIP: NEGATIVE
LIPASE SERPL-CCNC: 22 U/L
LYMPHOCYTES # BLD AUTO: 0.86 X10(3)/MCL (ref 0.6–4.6)
LYMPHOCYTES NFR BLD AUTO: 7.2 %
MCH RBC QN AUTO: 28.9 PG (ref 27–31)
MCHC RBC AUTO-ENTMCNC: 34.2 G/DL (ref 33–36)
MCV RBC AUTO: 84.3 FL (ref 80–94)
MDMA UR QL SCN: NEGATIVE
MONOCYTES # BLD AUTO: 0.33 X10(3)/MCL (ref 0.1–1.3)
MONOCYTES NFR BLD AUTO: 2.8 %
MUCOUS THREADS URNS QL MICRO: ABNORMAL /LPF
MUCOUS THREADS URNS QL MICRO: ABNORMAL /LPF
N GONORRHOEA DNA SPEC QL NAA+PROBE: NOT DETECTED
NEUTROPHILS # BLD AUTO: 10.65 X10(3)/MCL (ref 2.1–9.2)
NEUTROPHILS NFR BLD AUTO: 89.4 %
NITRITE UR QL STRIP: NEGATIVE
NITRITE UR QL STRIP: NEGATIVE
NRBC BLD AUTO-RTO: 0 %
OPIATES UR QL SCN: NEGATIVE
PCP UR QL: NEGATIVE
PH UR STRIP: 6 [PH]
PH UR STRIP: 6.5 [PH]
PH UR: 6.5 [PH] (ref 3–11)
PH, POC UA: 6
PLATELET # BLD AUTO: 221 X10(3)/MCL (ref 130–400)
PMV BLD AUTO: 10.4 FL (ref 7.4–10.4)
POC BLOOD, URINE: POSITIVE
POC NITRATES, URINE: NEGATIVE
POTASSIUM SERPL-SCNC: 4 MMOL/L (ref 3.5–5.1)
PROT SERPL-MCNC: 8 GM/DL (ref 6.4–8.3)
PROT UR QL STRIP: ABNORMAL
PROT UR QL STRIP: ABNORMAL
PROT UR QL STRIP: POSITIVE
RBC # BLD AUTO: 5.3 X10(6)/MCL (ref 4.7–6.1)
RBC #/AREA URNS AUTO: ABNORMAL /HPF
RBC #/AREA URNS AUTO: ABNORMAL /HPF
SODIUM SERPL-SCNC: 141 MMOL/L (ref 136–145)
SOURCE (OHS): NORMAL
SP GR UR STRIP.AUTO: 1.03 (ref 1–1.03)
SP GR UR STRIP.AUTO: 1.04 (ref 1–1.03)
SP GR UR STRIP: >=1.03 (ref 1–1.03)
SPECIFIC GRAVITY, URINE AUTO (.000) (OHS): 1.04 (ref 1–1.03)
SQUAMOUS #/AREA URNS LPF: ABNORMAL /HPF
SQUAMOUS #/AREA URNS LPF: ABNORMAL /HPF
UROBILINOGEN UR STRIP-ACNC: ABNORMAL (ref 0.3–2.2)
UROBILINOGEN UR STRIP-ACNC: NORMAL
UROBILINOGEN UR STRIP-ACNC: NORMAL
WBC # BLD AUTO: 11.91 X10(3)/MCL (ref 4.5–11.5)
WBC #/AREA URNS AUTO: ABNORMAL /HPF
WBC #/AREA URNS AUTO: ABNORMAL /HPF

## 2024-06-18 PROCEDURE — 81003 URINALYSIS AUTO W/O SCOPE: CPT | Mod: PBBFAC | Performed by: NURSE PRACTITIONER

## 2024-06-18 PROCEDURE — 85025 COMPLETE CBC W/AUTO DIFF WBC: CPT | Performed by: PHYSICIAN ASSISTANT

## 2024-06-18 PROCEDURE — 81015 MICROSCOPIC EXAM OF URINE: CPT | Performed by: PHYSICIAN ASSISTANT

## 2024-06-18 PROCEDURE — 93005 ELECTROCARDIOGRAM TRACING: CPT | Mod: PBBFAC | Performed by: NURSE PRACTITIONER

## 2024-06-18 PROCEDURE — 99214 OFFICE O/P EST MOD 30 MIN: CPT | Mod: S$PBB,,, | Performed by: NURSE PRACTITIONER

## 2024-06-18 PROCEDURE — 80307 DRUG TEST PRSMV CHEM ANLYZR: CPT | Performed by: PHYSICIAN ASSISTANT

## 2024-06-18 PROCEDURE — 81001 URINALYSIS AUTO W/SCOPE: CPT | Mod: XB | Performed by: NURSE PRACTITIONER

## 2024-06-18 PROCEDURE — 96375 TX/PRO/DX INJ NEW DRUG ADDON: CPT

## 2024-06-18 PROCEDURE — 99284 EMERGENCY DEPT VISIT MOD MDM: CPT | Mod: 25,27

## 2024-06-18 PROCEDURE — 63600175 PHARM REV CODE 636 W HCPCS: Performed by: PHYSICIAN ASSISTANT

## 2024-06-18 PROCEDURE — 96361 HYDRATE IV INFUSION ADD-ON: CPT

## 2024-06-18 PROCEDURE — 25000003 PHARM REV CODE 250

## 2024-06-18 PROCEDURE — 80053 COMPREHEN METABOLIC PANEL: CPT | Performed by: PHYSICIAN ASSISTANT

## 2024-06-18 PROCEDURE — 25000003 PHARM REV CODE 250: Performed by: PHYSICIAN ASSISTANT

## 2024-06-18 PROCEDURE — 87661 TRICHOMONAS VAGINALIS AMPLIF: CPT | Performed by: NURSE PRACTITIONER

## 2024-06-18 PROCEDURE — 96374 THER/PROPH/DIAG INJ IV PUSH: CPT

## 2024-06-18 PROCEDURE — 81015 MICROSCOPIC EXAM OF URINE: CPT | Performed by: NURSE PRACTITIONER

## 2024-06-18 PROCEDURE — 87591 N.GONORRHOEAE DNA AMP PROB: CPT | Performed by: NURSE PRACTITIONER

## 2024-06-18 PROCEDURE — 83690 ASSAY OF LIPASE: CPT | Performed by: PHYSICIAN ASSISTANT

## 2024-06-18 PROCEDURE — 93010 ELECTROCARDIOGRAM REPORT: CPT | Mod: S$PBB,,, | Performed by: NURSE PRACTITIONER

## 2024-06-18 PROCEDURE — 99215 OFFICE O/P EST HI 40 MIN: CPT | Mod: PBBFAC | Performed by: NURSE PRACTITIONER

## 2024-06-18 PROCEDURE — 87491 CHLMYD TRACH DNA AMP PROBE: CPT | Performed by: NURSE PRACTITIONER

## 2024-06-18 RX ORDER — DOXYCYCLINE HYCLATE 100 MG
100 TABLET ORAL 2 TIMES DAILY
Qty: 14 TABLET | Refills: 0 | Status: SHIPPED | OUTPATIENT
Start: 2024-06-18 | End: 2024-06-25

## 2024-06-18 RX ORDER — PROCHLORPERAZINE EDISYLATE 5 MG/ML
2.5 INJECTION INTRAMUSCULAR; INTRAVENOUS
Status: COMPLETED | OUTPATIENT
Start: 2024-06-18 | End: 2024-06-18

## 2024-06-18 RX ORDER — KETOROLAC TROMETHAMINE 30 MG/ML
15 INJECTION, SOLUTION INTRAMUSCULAR; INTRAVENOUS
Status: COMPLETED | OUTPATIENT
Start: 2024-06-18 | End: 2024-06-18

## 2024-06-18 RX ORDER — SODIUM CHLORIDE 9 MG/ML
1000 INJECTION, SOLUTION INTRAVENOUS
Status: DISCONTINUED | OUTPATIENT
Start: 2024-06-18 | End: 2024-06-18

## 2024-06-18 RX ORDER — ONDANSETRON 4 MG/1
4 TABLET, ORALLY DISINTEGRATING ORAL
Status: COMPLETED | OUTPATIENT
Start: 2024-06-18 | End: 2024-06-18

## 2024-06-18 RX ORDER — ONDANSETRON 4 MG/1
4 TABLET, ORALLY DISINTEGRATING ORAL EVERY 6 HOURS PRN
Qty: 16 TABLET | Refills: 0 | Status: SHIPPED | OUTPATIENT
Start: 2024-06-18 | End: 2024-06-18

## 2024-06-18 RX ORDER — ONDANSETRON 4 MG/1
4 TABLET, ORALLY DISINTEGRATING ORAL EVERY 6 HOURS PRN
Qty: 16 TABLET | Refills: 0 | Status: SHIPPED | OUTPATIENT
Start: 2024-06-18 | End: 2024-06-22

## 2024-06-18 RX ADMIN — ONDANSETRON 4 MG: 4 TABLET, ORALLY DISINTEGRATING ORAL at 10:06

## 2024-06-18 RX ADMIN — KETOROLAC TROMETHAMINE 15 MG: 30 INJECTION, SOLUTION INTRAMUSCULAR at 04:06

## 2024-06-18 RX ADMIN — PROCHLORPERAZINE EDISYLATE 2.5 MG: 5 INJECTION INTRAMUSCULAR; INTRAVENOUS at 04:06

## 2024-06-18 RX ADMIN — SODIUM CHLORIDE 1000 ML: 9 INJECTION, SOLUTION INTRAVENOUS at 04:06

## 2024-06-18 NOTE — PROGRESS NOTES
"Subjective:      Patient ID: Figueroa Murcia is a 34 y.o. male.    Vitals:  height is 5' 11" (1.803 m) and weight is 115.8 kg (255 lb 3.2 oz). His temperature is 97.7 °F (36.5 °C). His blood pressure is 158/82 (abnormal) and his pulse is 44 (abnormal). His respiration is 16 and oxygen saturation is 97%.     Chief Complaint: Nausea (Pt states he woke up at 2am with severe stomach pain/burning, nausea, vomiting, body chills. Last episode in lobby with dry heaving)    Nausea  Associated symptoms include nausea.    patient presents with complaint of fatigue, feeling weak, chills nausea and vomiting that started about 8:00 p.m. reports severe stomach burning that is diffuse patient reports 1 episode of diarrhea this a.m. and had a another bowel movement that was regular about 7:00 a.m. patient reports he saw his PCP in which he was informed he had bacteria in his urine however he was not placed on any antibiotics for treatment.  Record reviewed no source of culture identified from visit with PCP on 6/13/2024 Patient denies any stomach pain, dysuria, fever, headache, dizziness. Pt report she smokes five blunts of marijuana daily. Denies any recreational drug or heavy alcohol use.     Gastrointestinal:  Positive for nausea.   Skin:  Negative for erythema.      Objective:     Physical Exam   Constitutional: He is oriented to person, place, and time. He appears well-developed. He is cooperative.  Non-toxic appearance. He appears ill. No distress.      Comments:Patient slumped over in lying down on examination table nausea.   morbidly obeseawake  HENT:   Head: Normocephalic and atraumatic.   Ears:   Right Ear: Tympanic membrane normal.   Left Ear: Tympanic membrane normal.   Nose: Nose normal. No rhinorrhea, purulent discharge or congestion. Right sinus exhibits no maxillary sinus tenderness and no frontal sinus tenderness. Left sinus exhibits no maxillary sinus tenderness and no frontal sinus tenderness.   Mouth/Throat: Uvula " is midline. Mucous membranes are moist. No oropharyngeal exudate or posterior oropharyngeal erythema.   Eyes: Conjunctivae are normal. Right eye exhibits no discharge. Left eye exhibits no discharge. Extraocular movement intact   Neck: Neck supple. No neck rigidity present.   Cardiovascular: Normal rate, regular rhythm and normal pulses.   Pulmonary/Chest: Effort normal and breath sounds normal. No stridor. No respiratory distress. He has no wheezes. He has no rhonchi. He has no rales. He exhibits no tenderness.   Abdominal: Normal appearance and bowel sounds are normal. He exhibits no distension. Soft. There is no abdominal tenderness. There is no rebound, no guarding, no left CVA tenderness and no right CVA tenderness.   Musculoskeletal: Normal range of motion.         General: No signs of injury. Normal range of motion.      Cervical back: He exhibits no tenderness.      Left lower leg: No edema.   Lymphadenopathy:     He has no cervical adenopathy.   Neurological: no focal deficit. He is alert and oriented to person, place, and time.   Skin: Skin is warm, dry, intact, not diaphoretic and no rash. Capillary refill takes less than 2 seconds. No erythema   Psychiatric: He experiences Normal attention. His behavior is normal. Mood, affect, judgment and thought content normal.   Nursing note and vitals reviewed.chaperone present         Assessment:     1. Generalized abdominal pain    2. Abnormal urine findings    3. Nausea and vomiting, unspecified vomiting type    4. Marijuana use      Results for orders placed or performed in visit on 06/18/24   POCT Urinalysis, Dipstick, Automated, W/O Scope   Result Value Ref Range    POC Blood, Urine Positive (A) Negative    POC Bilirubin, Urine Positive (A) Negative    POC Urobilinogen, Urine 0.2 E.U./dL 0.3 - 2.2    POC Ketones, Urine Positive (A) Negative    POC Protein, Urine Positive (A) Negative    POC Nitrates, Urine Negative Negative    POC Glucose, Urine Negative  "Negative    pH, UA 6.0     POC Specific Gravity, Urine >=1.030 1.003 - 1.029    POC Leukocytes, Urine Negative Negative         Plan:   Patient informed of Zofran that has helped nausea side and has been no vomiting episodes since initial examination at bedside.  Abdominal exam benign  Discussed cannabis hyperemesis and other etiology to abdominal pain in addition to other etiologies such as abdominal emergencies.  Discussed early about the importance of not delaying care for severe abdominal pain.  Patient verbalized understanding.   Encouraged and recommended  patient to be evaluated in ER for dehydration secondary to vomiting and nausea and stomach pain, with low heart rate and elevated blood pressure.. In shared decision making pt agrees to go to ER is nausea and vomiting resumes in next 30 min to an hour, and states I will go home first and monitor myself and go so I don't have to sit in ER right now"    Strict ER precautions given  Presumptively also treated for UTI pending urine culture    Generalized abdominal pain  -     Trichomonas vaginalis Amplified RNA  -     Chlamydia/GC, PCR    Abnormal urine findings  -     Urinalysis, Reflex to Urine Culture  -     Trichomonas vaginalis Amplified RNA  -     Chlamydia/GC, PCR    Nausea and vomiting, unspecified vomiting type  -     POCT Urinalysis, Dipstick, Automated, W/O Scope  -     POCT EKG 12-LEAD (NOT FOR [facility] USE)  -     Urinalysis, Reflex to Urine Culture  -     Trichomonas vaginalis Amplified RNA  -     Chlamydia/GC, PCR    Marijuana use  -     POCT EKG 12-LEAD (NOT FOR [facility] USE)    Other orders  -     ondansetron (ZOFRAN-ODT) 4 MG TbDL; Take 1 tablet (4 mg total) by mouth every 6 (six) hours as needed (nausea).  Dispense: 16 tablet; Refill: 0  -     ondansetron disintegrating tablet 4 mg  -     ondansetron disintegrating tablet 4 mg  -     doxycycline (VIBRA-TABS) 100 MG tablet; Take 1 tablet (100 mg total) by mouth 2 (two) times daily. for 7 days  " Dispense: 14 tablet; Refill: 0          Medical Decision Making:   Differential Diagnosis:   Pyelonephritis, cannabis hyperemesis, dehydration secondary to diarrhea and vomiting, panic or anxiety attack, Sepsis,

## 2024-06-18 NOTE — ED PROVIDER NOTES
Encounter Date: 6/18/2024       History     Chief Complaint   Patient presents with    Abdominal Pain     PT IN /AASI W CO ABD/FLANK PAIN W NVD AND DYSURIA. SINCE THIS AM.    SEEN AT  THIS AM BUT FEELING WORSE.  STATES FEELS DEHYDRATED.  PT USING FINGER DOWN THROAT IN ATTEMPT TO INDUCE VOMITING.      Figueroa Murcia is a 34 y.o. male who presents to the ED with complaints of nausea, vomiting, abdominal pain that started at 2 this morning. He reports he went to an urgent care and was prescribed medication but did not pick it up. He states he tried to take a hot shower for the pain but it did not help. Reports daily marijuana use.       The history is provided by the patient. No  was used.     Review of patient's allergies indicates:  No Known Allergies  Past Medical History:   Diagnosis Date    At risk for alteration in comfort     Mixed hyperlipidemia     Obesity, unspecified     Tobacco abuse      Past Surgical History:   Procedure Laterality Date    arm repair Right     LAPAROSCOPIC APPENDECTOMY N/A 10/22/2022    Procedure: APPENDECTOMY, LAPAROSCOPIC;  Surgeon: Vinay Sims Jr., MD;  Location: [facility];  Service: General;  Laterality: N/A;     Family History   Problem Relation Name Age of Onset    Hypertension Mother      No Known Problems Father       Social History     Tobacco Use    Smoking status: Every Day     Current packs/day: 0.50     Average packs/day: 0.5 packs/day for 17.5 years (8.7 ttl pk-yrs)     Types: Cigarettes     Start date: 2007    Smokeless tobacco: Never   Substance Use Topics    Alcohol use: Not Currently     Comment: occasionally    Drug use: Yes     Types: Marijuana     Comment: 4 blunts per day     Review of Systems   Constitutional:  Negative for chills, fatigue and fever.   HENT:  Negative for congestion, ear pain, sinus pain and sore throat.    Eyes:  Negative for pain.   Respiratory:  Negative for cough, chest tightness and shortness of breath.     Cardiovascular:  Negative for chest pain.   Gastrointestinal:  Positive for abdominal pain, diarrhea, nausea and vomiting. Negative for constipation.   Genitourinary:  Negative for dysuria.   Musculoskeletal:  Negative for back pain and joint swelling.   Skin:  Negative for color change and rash.   Neurological:  Negative for dizziness and weakness.   Psychiatric/Behavioral:  Negative for behavioral problems and confusion.        Physical Exam     Initial Vitals [06/18/24 1501]   BP Pulse Resp Temp SpO2   130/63 (!) 52 18 98.3 °F (36.8 °C) 97 %      MAP       --         Physical Exam    Nursing note and vitals reviewed.  Constitutional: He appears well-developed and well-nourished.   HENT:   Head: Normocephalic and atraumatic.   Right Ear: External ear normal.   Left Ear: External ear normal.   Nose: Nose normal.   Eyes: Conjunctivae and EOM are normal.   Neck: Neck supple. No thyromegaly present. No tracheal deviation present.   Cardiovascular:  Normal rate, regular rhythm and normal heart sounds.     Exam reveals no gallop and no friction rub.       No murmur heard.  Pulmonary/Chest: Breath sounds normal. No respiratory distress. He has no wheezes. He has no rhonchi. He has no rales. He exhibits no tenderness.   Abdominal: Abdomen is soft. He exhibits no distension.   Musculoskeletal:      Cervical back: Neck supple.     Neurological: He is alert and oriented to person, place, and time. GCS score is 15. GCS eye subscore is 4. GCS verbal subscore is 5. GCS motor subscore is 6.   Skin: Skin is warm. Capillary refill takes less than 2 seconds. No rash and no abscess noted. No erythema. No pallor.   Psychiatric: He has a normal mood and affect.         ED Course   Procedures  Labs Reviewed   COMPREHENSIVE METABOLIC PANEL - Abnormal; Notable for the following components:       Result Value    Glucose 147 (*)     Blood Urea Nitrogen 7.8 (*)     Globulin 3.8 (*)     All other components within normal limits    URINALYSIS, REFLEX TO URINE CULTURE - Abnormal; Notable for the following components:    Specific Gravity, UA 1.036 (*)     Protein, UA 1+ (*)     Glucose, UA Trace (*)     Ketones, UA Trace (*)     Squamous Epithelial Cells, UA Trace (*)     Mucous, UA Many (*)     All other components within normal limits   DRUG SCREEN, URINE (BEAKER) - Abnormal; Notable for the following components:    Cannabinoids, Urine Positive (*)     Specific Gravity, Urine Auto 1.036 (*)     All other components within normal limits    Narrative:     Cut off concentrations:    Amphetamines - 1000 ng/ml  Barbiturates - 200 ng/ml  Benzodiazepine - 200 ng/ml  Cannabinoids (THC) - 50 ng/ml  Cocaine - 300 ng/ml  Fentanyl - 1.0 ng/ml  MDMA - 500 ng/ml  Opiates - 300 ng/ml   Phencyclidine (PCP) - 25 ng/ml    Specimen submitted for drug analysis and tested for pH and specific gravity in order to evaluate sample integrity. Suspect tampering if specific gravity is <1.003 and/or pH is not within the range of 4.5 - 8.0  False negatives may result form substances such as bleach added to urine.  False positives may result for the presence of a substance with similar chemical structure to the drug or its metabolite.    This test provides only a PRELIMINARY analytical test result. A more specific alternate chemical method must be used in order to obtain a confirmed analytical result. Gas chromatography/mass spectrometry (GC/MS) is the preferred confirmatory method. Other chemical confirmation methods are available. Clinical consideration and professional judgement should be applied to any drug of abuse test result, particularly when preliminary positive results are used.    Positive results will be confirmed only at the physicians request. Unconfirmed screening results are to be used only for medical purposes (treatment).        CBC WITH DIFFERENTIAL - Abnormal; Notable for the following components:    WBC 11.91 (*)     Neut # 10.65 (*)     All other  components within normal limits   LIPASE - Normal   CBC W/ AUTO DIFFERENTIAL    Narrative:     The following orders were created for panel order CBC auto differential.  Procedure                               Abnormality         Status                     ---------                               -----------         ------                     CBC with Differential[[id number]]       Abnormal            Final result                 Please view results for these tests on the individual orders.   EXTRA TUBES    Narrative:     The following orders were created for panel order EXTRA TUBES.  Procedure                               Abnormality         Status                     ---------                               -----------         ------                     Light Blue Top Hold[[id number]]                             Final result               Red Top Hold[[id number]]                                    Final result               Gold Top Hold[[id number]]                                   Final result                 Please view results for these tests on the individual orders.   LIGHT BLUE TOP HOLD   RED TOP HOLD   GOLD TOP HOLD          Imaging Results    None          Medications   sodium chloride 0.9% bolus 1,000 mL 1,000 mL (0 mLs Intravenous Stopped 6/18/24 1722)   prochlorperazine injection Soln 2.5 mg (2.5 mg Intravenous Given 6/18/24 1636)   ketorolac injection 15 mg (15 mg Intravenous Given 6/18/24 1636)     Medical Decision Making  DDX: cannibis induced hyperemesis, gastroenteritis, appendicitis, among others    Figueroa Murcia is a 34 y.o. male who presents to the ED with complaints of nausea, vomiting, abdominal pain that started at 2 this morning. He reports he went to an urgent care and was prescribed medication but did not pick it up. He states he tried to take a hot shower for the pain but it did not help. Reports daily marijuana use.     Hospital Course: IV with fluids and labs ordered. Labs wnl. Drug  screen positive for cannibis. Patient given Compazine and Toradol with IV fluids. He reports improvement of symptoms. Requesting discharge following fluids. I have instructed him to  the prescribed Zofran from the urgent care. He verbalized undertsanding. Stable for discharge.     Amount and/or Complexity of Data Reviewed  Labs: ordered.    Risk  Prescription drug management.                                      Clinical Impression:  Final diagnoses:  [R11.2] Nausea and vomiting, unspecified vomiting type (Primary)          ED Disposition Condition    Discharge Stable          ED Prescriptions    None       Follow-up Information       Follow up With Specialties Details Why Contact Info    Aida Jackson, MADIP Internal Medicine   [address] [facility] [address] [facility] [id number] [phone]      [facility] - Emergency Dept Emergency Medicine In 3 days As needed, If symptoms worsen [address] [address] [phone]             Enid Leung PA-C  06/18/24 7154

## 2024-06-18 NOTE — DISCHARGE INSTRUCTIONS
Please  the medication that was prescribed today from the urgent care.    It is important that you follow up with your primary care provider or specialist if indicated for further evaluation, workup, and treatment as necessary. The exam and treatment you received in Emergency Department was for an urgent problem and NOT INTENDED AS COMPLETE CARE. It is important that you FOLLOW UP with a doctor for ongoing care. If your symptoms become WORSE or you DO NOT IMPROVE and you are unable to reach your health care provider, you should RETURN to the Emergency Department.

## 2024-06-18 NOTE — PATIENT INSTRUCTIONS
"Please follow instructions on patient education material.      Return to urgent care in 2 to 3 days if symptoms are not improving, immediately if you develop any new or worsening symptoms.   As discussed with you and your female partner:   Encouraged and recommended  patient to be evaluated in ER for dehydration secondary to vomiting and nausea and stomach pain, with low heart rate and elevated blood pressure.. In shared decision making pt agrees to go to ER is nausea and vomiting resumes in next 30 min to an hour, and states I will go home first and monitor myself and go so I don't have to sit in ER right now"  Urine culture and STD testing pending.  - abstain from sex until all results are known  - urine tests take up to 7 days to result    - this clinic will ONLY call you for POSITIVE = ABNORMAL results.   We will not call you to tell you tests are NEGATIVE = NORMAL.    - if you need medication to treat any conditions, it was either prescribed to you today and sent to your pharmacy, or it will be sent when providers view abnormal results.  - if any of your tests are abnormal, we will call you with a plan of care.  - always require condoms  - partners will need treatment if  any +STDs on your testing. They have to have their own visit, we do not automatically treat them      - Increase your water intake to 5-6 bottles per day  - No cranberry juice  - Start antibiotics today, if your urine culture results show that you need to change antibiotics to clear your infection, we will know that in 3 days. If you need a change, we will automatically send new medication to your pharmacy and call you to let you know we did that. Otherwise, if you do not hear from us, finish your entire antibiotic Rx.    - If you start to have fevers 100.4+, significant lower back/lower abdominal pain, vomiting, chills/body aches, or worsening bladder/urination symptoms - go to the ER.   "

## 2024-06-19 LAB
SPECIMEN SOURCE: NORMAL
T VAGINALIS RRNA SPEC QL NAA+PROBE: NEGATIVE

## 2024-08-01 ENCOUNTER — HOSPITAL ENCOUNTER (OUTPATIENT)
Dept: RADIOLOGY | Facility: HOSPITAL | Age: 34
Discharge: HOME | End: 2024-08-01
Attending: NURSE PRACTITIONER
Payer: MEDICAID

## 2024-08-01 ENCOUNTER — OFFICE VISIT (OUTPATIENT)
Dept: ORTHOPEDICS | Facility: CLINIC | Age: 34
End: 2024-08-01
Payer: MEDICAID

## 2024-08-01 VITALS
HEIGHT: 71 IN | BODY MASS INDEX: 35.31 KG/M2 | TEMPERATURE: 98 F | WEIGHT: 252.19 LBS | DIASTOLIC BLOOD PRESSURE: 67 MMHG | SYSTOLIC BLOOD PRESSURE: 127 MMHG | HEART RATE: 67 BPM

## 2024-08-01 DIAGNOSIS — M17.11 PRIMARY OSTEOARTHRITIS OF RIGHT KNEE: ICD-10-CM

## 2024-08-01 DIAGNOSIS — M17.11 PRIMARY OSTEOARTHRITIS OF RIGHT KNEE: Primary | ICD-10-CM

## 2024-08-01 PROCEDURE — 99214 OFFICE O/P EST MOD 30 MIN: CPT | Mod: PBBFAC,25 | Performed by: NURSE PRACTITIONER

## 2024-08-01 PROCEDURE — 3008F BODY MASS INDEX DOCD: CPT | Mod: CPTII,,, | Performed by: NURSE PRACTITIONER

## 2024-08-01 PROCEDURE — 99215 OFFICE O/P EST HI 40 MIN: CPT | Mod: 25,S$PBB,, | Performed by: NURSE PRACTITIONER

## 2024-08-01 PROCEDURE — 3078F DIAST BP <80 MM HG: CPT | Mod: CPTII,,, | Performed by: NURSE PRACTITIONER

## 2024-08-01 PROCEDURE — 1159F MED LIST DOCD IN RCRD: CPT | Mod: CPTII,,, | Performed by: NURSE PRACTITIONER

## 2024-08-01 PROCEDURE — 3044F HG A1C LEVEL LT 7.0%: CPT | Mod: CPTII,,, | Performed by: NURSE PRACTITIONER

## 2024-08-01 PROCEDURE — 73564 X-RAY EXAM KNEE 4 OR MORE: CPT | Mod: TC,RT

## 2024-08-01 PROCEDURE — 3074F SYST BP LT 130 MM HG: CPT | Mod: CPTII,,, | Performed by: NURSE PRACTITIONER

## 2024-08-01 PROCEDURE — 1160F RVW MEDS BY RX/DR IN RCRD: CPT | Mod: CPTII,,, | Performed by: NURSE PRACTITIONER

## 2024-08-01 NOTE — PROGRESS NOTES
"[facility]  Subjective:   PATIENT ID: Figueroa Murcia is a 34 y.o. male.   CHIEF COMPLAINT: Pain of the Right Knee    HPI:  Right knee pain medial stiffness and aching    Injury/ Surgical HX r/t CC:  no HX of prior significant joint injury   Onset: several years  fluctuates    Modifying Factors: exacerbated by:  increased activity, prolonged sitting, prolonged walking/ standing improved with:  movement in less than 30 minutes , rest   Associated Symptoms:  [] joint locking  [] joint catching  [x] decreased ROM  [x] crepitus [x] Weakness/ "giving out"  [] falls  [] swelling  [x] difficult sleeping s/t pain        Activity: sedentary with light activity and pain moderately interferes with ADLs, assistive device none   Previous Treatments:  [] HEP > 6 weeks  [] RX PT   [] Off-loading brace  [] Soft knee splint [x] OTC Pain Relievers: Tylenol, ibuprofen  [x] RX Medications: po diclofenac, topical diclofenac  [] CSI  [] Hyaluronic acid injections   PMH:  smoker and obesity, DX irritability and anger   Family History: + OA   NOTE:  New patient referred for right knee pain with minimal conservative treatments.  Symptoms affecting ADLs.   Employment HX: [employer], currently employed.      Current Outpatient Medications:     atorvastatin (LIPITOR) 40 MG tablet, Take 1 tablet (40 mg total) by mouth every evening. For High Cholesterol, Disp: 90 tablet, Rfl: 0    diclofenac (VOLTAREN) 75 MG EC tablet, Take 1 tablet (75 mg total) by mouth 2 (two) times daily as needed (Knee pain)., Disp: 60 tablet, Rfl: 5    diclofenac sodium (VOLTAREN) 1 % Gel, Apply 2 g topically 4 (four) times daily as needed (Knee Pain)., Disp: 100 g, Rfl: 6    ergocalciferol (ERGOCALCIFEROL) 50,000 unit Cap, Take 1 capsule (50,000 Units total) by mouth every 7 days. For Low Vitamin D. Start over the counter once completed. for 8 doses, Disp: 8 capsule, Rfl: 0  Review of patient's allergies " "indicates:  No Known Allergies  REVIEW OF SYSTEMS:  A ten-point review of systems was performed and is negative, except as mentioned above   Objective:   Body mass index is 35.18 kg/m².   Vitals:    08/01/24 0829   BP: 127/67   Pulse: 67   Temp: 97.6 °F (36.4 °C)   Weight: 114.4 kg (252 lb 3.3 oz)   Height: 5' 11" (1.803 m)   PainSc:   7     MSK Knee Exam  General:  no apparent distress, no pain indicators,  obesity  Inspection: lower extremities in proportion with overall body habitus, no erythema   ,  no erythema, limping gait w/ full weight full  LEFT KNEE RIGHT KNEE   [] Swelling  [] Varus deformity  [] Rash [] Contusion  [] Mass  [] Scars [x] Swelling mild  [x] Mild varus deformity  [] Rash [] Contusion  [] Mass  [] Scars   Palpation:     LEFT KNEE RIGHT KNEE   Joint Warmth: normal  POMT: none  [] Patellar grind with compression  [] Crepitus  [] Joint effusion  [] Quad atrophy  [] Active mechanical locking with joint movement  [] Popliteal fullness  [] Tenderness medial joint line  [] Tenderness lateral joint line  [] Tenderness of tibial plateau   [] Tenderness of patellar tendon  [] Tenderness of quadriceps tendon  [] Tenderness of prepatellar   [] Tenderness of infrapatellar  [] Tenderness of anserine bursae  [] Tenderness of patellar facet  [] Tenderness over lateral retinaculum Joint Warmth: normal  POMT: lateral joint line  [x] Patellar grind with compression  [x] Crepitus  [] Joint effusion  [x] Quad atrophy  [] Active mechanical locking with joint movement  [] Popliteal fullness  [x] Tenderness medial joint line  [x] Tenderness lateral joint line  [] Tenderness of tibial plateau   [] Tenderness of patellar tendon  [] Tenderness of quadriceps tendon  [] Tenderness of prepatellar  [] Tenderness of infrapatellar  [] Tenderness of anserine bursae  [x] Tenderness of patellar facet  [] Tenderness over lateral retinaculum   ROM Active Flexion / Extension (0-140)  Left 0 / 130 w/o pain Right 0 / 121 w/ pain "   Strength Flexion / Extension (5 / 5)  Left 5 / 5 Right 5 / 5     Special Testing:         Not  Tested IT Band Syndrome L+ L-- R+ R--    [] Diane's Test [] [x] [] [x]     Joint Effusion        [] Ballotable Effusion [] [x] [] [x]    [] Fluid Wave [] [x] [] [x]     Patellar Testing        [] Apprehension [] [x] [] [x]     Meniscal Injury        [] Magdalena's Test [] [x] [x] []    [x] Thessaly's Test [] [] [] []     Ligament Injury        [] ACL Anterior Drawer [] [x] [] [x]    [] PCL Posterior Drawer [] [x] [] [x]    [] LCL Varus Test [] [x] [] [x]    [] MCL Valgus Test [] [x] [] [x]      Hip Exam normal  Ankle Exam normal  Neurovascular: Intact to light touch  Neuro/ Psych: Awake, alert, oriented, normal mood and affect  Lymphatic: No LAD  Skin/ Soft Tissue: no rash, skin intact  Cardio: no edema, vascular integrity noted   Assessment:   IMAGING:  XR Ordered by me today 4 views of right knee reviewed and independently interpreted by me with noted no acute findings noted, findings suggestive of arthritic changes, medial joint space narrowing.  Awaiting radiologist findings.  Findings discussed with patient today.    MRI/CT: none    LABS:   Hemoglobin A1c   Date Value Ref Range Status   06/12/2024 5.4 <=7.0 % Final   05/18/2023 5.0 <=7.0 % Final   07/21/2022 5.1 <=7.0 % Final     EMR REVIEW: completed with noted Referral documentation reviewed  Diagnosis & Treatment Plan:   DIAGNOSIS: Moderate exacerbation of chronic Right Mild-to-moderate knee arthritis Kellgren-Eber Grade 2  1. Primary osteoarthritis of right knee    2. BMI 35.0-35.9,adult      TREATMENT PLAN:  Orders Placed This Encounter    X-Ray Knee Complete 4 Or More Views Right     Treatment plan:    Recommend initial conservative treatments including: soft knee splint, HEP with TheraBand > 6 weeks, and topical diclofenac PRN w/ OTC Tylenol for additional relief PRN.   Ongoing education about DX, treatment recommendations and reasonable expectations  including goal to decrease pain and increase function  Conservative treatments including, but limited to:  activity modification as needed, daily HEP with TheraBand, proper supportive footwear, non-impact muscle strengthening with use of stationary bike (RPM set at 80 or > with slow progression to goal of 40 minutes 3-4 times per week as tolerated), walking-aides as needed, adequate vit D/C, glucosamine 1500 mg/day and/or daily acetaminophen 1000 mg 3 times/ day if able to tolerate.    Weight management is paramount. Immediate BMI reduction goal 5-10% of body weight.  Achieving a BMI < 25 would be optimal for overall health and symptoms relief.   Patient aware condition has no cure and treatment plan is focused on management of symptoms.  Procedure: CSI v. VS injections discussed as treatment options in future if conservative treatments fail.   RX Medications: continue medications as RX per PCP; RX topical diclofenac as directed, medication precautions given.   RTC:  2 months  NOTE: None    Leah Menard-Neumann FNP [facility]  Procedure Note:   None  Time Based Billing   Total Time Spent with Patient: 30 minutes Excludes Time Spent Performing Procedure  Visit Start Time: 0900  10 minutes spent prior to visit reviewing EMR, prior labs and x-rays  20 minutes spent in visit with patient face-to-face time completing exam, obtaining history, educating on DX and treatment plan.  10 minutes spent after visit completing EMR documentation.   Visit End Time: 0940     *Please be aware that this note has been generated with the assistance of [employer] voice-to-text.  Please excuse any spelling or grammatical errors. Positive findings indicted by checkmark*     Comments: Will continue month 9 on 40mg daily medication was not picked up for month 5 \\nPatient thinks only 1-2 breakouts since last visit but unsure exactly. Today only 1 resolving inflammatory papule seen.\\nSuspect about 1-2 more months of treatments, working on pigmentation and scarring at this point

## 2024-08-13 ENCOUNTER — OFFICE VISIT (OUTPATIENT)
Dept: URGENT CARE | Facility: CLINIC | Age: 34
End: 2024-08-13
Attending: NURSE PRACTITIONER
Payer: MEDICAID

## 2024-08-13 ENCOUNTER — LAB VISIT (OUTPATIENT)
Dept: LAB | Facility: HOSPITAL | Age: 34
End: 2024-08-13
Attending: NURSE PRACTITIONER
Payer: MEDICAID

## 2024-08-13 VITALS
WEIGHT: 247 LBS | BODY MASS INDEX: 34.58 KG/M2 | HEART RATE: 60 BPM | RESPIRATION RATE: 20 BRPM | TEMPERATURE: 98 F | HEIGHT: 71 IN | SYSTOLIC BLOOD PRESSURE: 130 MMHG | OXYGEN SATURATION: 100 % | DIASTOLIC BLOOD PRESSURE: 81 MMHG

## 2024-08-13 DIAGNOSIS — E78.2 MIXED HYPERLIPIDEMIA: ICD-10-CM

## 2024-08-13 DIAGNOSIS — R11.0 NAUSEA: Primary | ICD-10-CM

## 2024-08-13 LAB
ANION GAP SERPL CALC-SCNC: 8 MEQ/L
BACTERIA #/AREA URNS AUTO: ABNORMAL /HPF
BILIRUB UR QL STRIP.AUTO: NEGATIVE
BUN SERPL-MCNC: 7.8 MG/DL (ref 8.9–20.6)
CALCIUM SERPL-MCNC: 9.5 MG/DL (ref 8.4–10.2)
CHLORIDE SERPL-SCNC: 108 MMOL/L (ref 98–107)
CHOLEST SERPL-MCNC: 140 MG/DL
CHOLEST/HDLC SERPL: 3 {RATIO} (ref 0–5)
CLARITY UR: ABNORMAL
CO2 SERPL-SCNC: 25 MMOL/L (ref 22–29)
COLOR UR AUTO: YELLOW
CREAT SERPL-MCNC: 1.02 MG/DL (ref 0.73–1.18)
CREAT/UREA NIT SERPL: 8
CTP QC/QA: YES
CTP QC/QA: YES
GFR SERPLBLD CREATININE-BSD FMLA CKD-EPI: >60 ML/MIN/1.73/M2
GLUCOSE SERPL-MCNC: 98 MG/DL (ref 74–100)
GLUCOSE UR QL STRIP: NORMAL
HDLC SERPL-MCNC: 46 MG/DL (ref 35–60)
HGB UR QL STRIP: NEGATIVE
HYALINE CASTS #/AREA URNS LPF: ABNORMAL /LPF
KETONES UR QL STRIP: NEGATIVE
LDLC SERPL CALC-MCNC: 77 MG/DL (ref 50–140)
LEUKOCYTE ESTERASE UR QL STRIP: NEGATIVE
MOLECULAR STREP A: NEGATIVE
MUCOUS THREADS URNS QL MICRO: ABNORMAL /LPF
NITRITE UR QL STRIP: NEGATIVE
PH UR STRIP: 7.5 [PH]
POTASSIUM SERPL-SCNC: 3.6 MMOL/L (ref 3.5–5.1)
PROT UR QL STRIP: ABNORMAL
RBC #/AREA URNS AUTO: ABNORMAL /HPF
SARS-COV-2 RDRP RESP QL NAA+PROBE: NEGATIVE
SODIUM SERPL-SCNC: 141 MMOL/L (ref 136–145)
SP GR UR STRIP.AUTO: 1.03 (ref 1–1.03)
SQUAMOUS #/AREA URNS LPF: ABNORMAL /HPF
TRIGL SERPL-MCNC: 83 MG/DL (ref 34–140)
UROBILINOGEN UR STRIP-ACNC: ABNORMAL
VLDLC SERPL CALC-MCNC: 17 MG/DL
WBC #/AREA URNS AUTO: ABNORMAL /HPF

## 2024-08-13 PROCEDURE — 36415 COLL VENOUS BLD VENIPUNCTURE: CPT

## 2024-08-13 PROCEDURE — 99213 OFFICE O/P EST LOW 20 MIN: CPT | Mod: S$PBB,,, | Performed by: NURSE PRACTITIONER

## 2024-08-13 PROCEDURE — 99214 OFFICE O/P EST MOD 30 MIN: CPT | Mod: PBBFAC | Performed by: NURSE PRACTITIONER

## 2024-08-13 PROCEDURE — 80048 BASIC METABOLIC PNL TOTAL CA: CPT

## 2024-08-13 PROCEDURE — 81001 URINALYSIS AUTO W/SCOPE: CPT

## 2024-08-13 PROCEDURE — 87651 STREP A DNA AMP PROBE: CPT | Mod: PBBFAC | Performed by: NURSE PRACTITIONER

## 2024-08-13 PROCEDURE — 80061 LIPID PANEL: CPT

## 2024-08-13 PROCEDURE — 81015 MICROSCOPIC EXAM OF URINE: CPT

## 2024-08-13 PROCEDURE — 87635 SARS-COV-2 COVID-19 AMP PRB: CPT | Mod: PBBFAC | Performed by: NURSE PRACTITIONER

## 2024-08-13 RX ORDER — ONDANSETRON 4 MG/1
4 TABLET, ORALLY DISINTEGRATING ORAL EVERY 6 HOURS PRN
Qty: 8 TABLET | Refills: 0 | Status: SHIPPED | OUTPATIENT
Start: 2024-08-13 | End: 2024-08-14 | Stop reason: ALTCHOICE

## 2024-08-13 NOTE — LETTER
August 13, 2024      [facility]  [facility] [address]  Phone: [phone]       Patient: Figueroa Murcia   YOB: 1990  Date of Visit: 08/13/2024    To Whom It May Concern:    Mikhail Murcia  was at [facility] on 08/13/2024. The patient may return to work/school on 08/15/2024 with no restrictions. If you have any questions or concerns, or if I can be of further assistance, please do not hesitate to contact me.    Sincerely,  Debo Moffett NP

## 2024-08-13 NOTE — PROGRESS NOTES
"Subjective:      Patient ID: Figueroa Murcia is a 34 y.o. male.    Vitals:  height is 5' 11" (1.803 m) and weight is 112 kg (247 lb). His oral temperature is 98.2 °F (36.8 °C). His blood pressure is 130/81 and his pulse is 60. His respiration is 20 and oxygen saturation is 100%.     Chief Complaint: Headache (X1 day HA, nausea, and diarrhea. States he feels dehydrated. )    Headache      As stated in CC. Pt reports feeling nausea  this morning upon waking and 2 episodes of diarrhea today around 10:30 am after eating a ham sandwich. Pt went to have blood work labs for PCP visit tomorrow. Pt was seen in ER on 6/18 for n/v was given fluids and then discharged. Pt has known his tory of marijuana use.    Skin:  Negative for erythema.   Neurological:  Positive for headaches.      Objective:     Physical Exam   Constitutional: He is oriented to person, place, and time. He appears well-developed. He is cooperative.  Non-toxic appearance. He does not appear ill. No distress. obesityawake  HENT:   Head: Atraumatic.   Ears:   Right Ear: Tympanic membrane normal.   Left Ear: Tympanic membrane normal.   Nose: Nose normal. No rhinorrhea, purulent discharge or congestion. Right sinus exhibits no maxillary sinus tenderness and no frontal sinus tenderness. Left sinus exhibits no maxillary sinus tenderness and no frontal sinus tenderness.   Mouth/Throat: Uvula is midline, oropharynx is clear and moist and mucous membranes are normal. Mucous membranes are moist. Mucous membranes are not pale, not dry and not cyanotic. No trismus in the jaw. No uvula swelling. Tonsils are 0 on the right. Tonsils are 0 on the left. Tonsillar exudate.   Eyes: Conjunctivae are normal. Right eye exhibits no discharge. Left eye exhibits no discharge. Extraocular movement intact   Neck: Neck supple. No neck rigidity present.   Cardiovascular: Normal rate and regular rhythm.   Pulmonary/Chest: Effort normal. Stridor present. No respiratory distress. "   Abdominal: Normal appearance and bowel sounds are normal. He exhibits no distension. Soft. There is no abdominal tenderness. There is no rebound, no guarding, no left CVA tenderness and no right CVA tenderness.   Musculoskeletal:      Cervical back: He exhibits no tenderness.   Lymphadenopathy:     He has no cervical adenopathy.   Neurological: no focal deficit. He is alert and oriented to person, place, and time. He displays normal reflexes. No cranial nerve deficit.   Skin: Skin is warm, dry, not diaphoretic, not pale and no rash. Capillary refill takes less than 2 seconds. No erythema jaundice  Psychiatric: His behavior is normal. Mood, judgment and thought content normal.   Nursing note and vitals reviewed.      Assessment:     1. Nausea      Results for orders placed or performed in visit on 08/13/24   POCT COVID-19 Rapid Screening   Result Value Ref Range    POC Rapid COVID Negative Negative     Acceptable Yes    POCT Strep A, Molecular   Result Value Ref Range    Molecular Strep A, POC Negative Negative     Acceptable Yes          Plan:   Abdominal exam benign  Prescription management with Zofran and instructed to follow up and keep visit with PCP tomorrow as scheduled    - Increase your water intake to 5-6 bottles per day  - clear to full liquid diet for today.      Nausea  -     POCT COVID-19 Rapid Screening  -     POCT Strep A, Molecular  -     ondansetron (ZOFRAN-ODT) 4 MG TbDL; Take 1 tablet (4 mg total) by mouth every 6 (six) hours as needed (nausea).  Dispense: 8 tablet; Refill: 0          Medical Decision Making:   Differential Diagnosis:   Cannabis hyperemesis syndrome, viral gastritis

## 2024-08-13 NOTE — PATIENT INSTRUCTIONS
Please follow instructions on patient education material.    Return to urgent care in 2 to 3 days if symptoms are not improving, immediately if you develop any new or worsening symptoms.    - Increase your water intake to 5-6 bottles per day  See patient education for more information and guidance on a clear to full liquid diet for today.    We do not recommend taking Immodium/Antidiarrheal medications as this is how your body will rid itself of the infection from food.    The idea is to stay very hydrated at home. If you have so much vomiting and diarrhea that you cannot keep down water, and you are getting dizzy, light headed, or feel you may pass out, you need to go to the Emergency Room.    Nausea medication was sent to your pharmacy, please take it as directed and discussed.    Spray your toilet and shared surfaces with bleach spray after each use and wash your hands thoroughly with soap and warm water every single time you use the restroom.    New toothbrush in 2 days.    - If you start to have fevers 100.4+, significant lower back/lower abdominal pain, vomiting, chills/body aches, or worsening bladder/urination symptoms - go to the ER.

## 2024-08-14 ENCOUNTER — OFFICE VISIT (OUTPATIENT)
Dept: INTERNAL MEDICINE | Facility: CLINIC | Age: 34
End: 2024-08-14
Payer: MEDICAID

## 2024-08-14 DIAGNOSIS — E78.2 MIXED HYPERLIPIDEMIA: ICD-10-CM

## 2024-08-14 PROCEDURE — 1160F RVW MEDS BY RX/DR IN RCRD: CPT | Mod: CPTII,95,, | Performed by: NURSE PRACTITIONER

## 2024-08-14 PROCEDURE — 1159F MED LIST DOCD IN RCRD: CPT | Mod: CPTII,95,, | Performed by: NURSE PRACTITIONER

## 2024-08-14 PROCEDURE — 3044F HG A1C LEVEL LT 7.0%: CPT | Mod: CPTII,95,, | Performed by: NURSE PRACTITIONER

## 2024-08-14 PROCEDURE — 99442 PR PHYSICIAN TELEPHONE EVALUATION 11-20 MIN: CPT | Mod: 95,,, | Performed by: NURSE PRACTITIONER

## 2024-08-14 RX ORDER — ATORVASTATIN CALCIUM 40 MG/1
40 TABLET, FILM COATED ORAL NIGHTLY
Qty: 90 TABLET | Refills: 4 | Status: SHIPPED | OUTPATIENT
Start: 2024-08-14 | End: 2025-11-07

## 2024-08-14 NOTE — ASSESSMENT & PLAN NOTE
FLP At Goal  Continue RX atorvastatin to 20 mg q hs    Follow a low cholesterol, low saturated fat diet with less than 200 mg of cholesterol a day.   Avoid fried foods and high saturated fats (rodríguez, sausage, cookies, cakes, chips, cheese, whole milk, butter, mayonnaise, creamy dressings, gravy, stew, gumbo, boudin, cracklins and cream sauces).  Add flax seed or fish oil supplements to diet.   Increase dietary fiber.   Regular exercise improves cholesterol levels.  Physical activity 5 times a week for 30 minutes per day (or 150 minutes per week).   Stressed importance of dietary modifications.    Lab Results   Component Value Date    CHOL 140 08/13/2024     Lab Results   Component Value Date    HDL 46 08/13/2024     Lab Results   Component Value Date    TRIG 83 08/13/2024     Lab Results   Component Value Date    VLDL 17 08/13/2024     Lab Results   Component Value Date    LDL 77.00 08/13/2024

## 2024-08-14 NOTE — PROGRESS NOTES
SUHAIL Crowder   [facility]  [address] [address]      PATIENT NAME: Figueroa Murcia  : 1990  DATE: 24  MRN: [id number]      Patient PCP Information       Provider PCP Type    SUHAIL Crowder General            Reason for Visit / Chief Complaint: Hyperlipidemia (Taking cholesterol medication as prescribed )       History of Present Illness / Problem Focused Workflow     Figueroa Murcia presents to the clinic with Hyperlipidemia (Taking cholesterol medication as prescribed )     33 yo AAM here today for f/u. Medical problems Tobacco use (/ ppd) + THC.      2024  Pt here today for yearly wellness OV; labs completed and reviewed. Discussed with pt very elevated FLP; discussed conservative treatment measures for HLD including diet and exercise. Will start pt today on atorvastatin 20 mg q hs for RX management. Will f/u with pt in about 2 months via virtual with labs for eval. Pt urine sample abnormal as well, recent CT of the abdomen was negative for any urinary or renal findings. Will repeat UA in 2 months as well with labs or prn if pt has any acute issues. Pt reports he does sweat often, reports area under the breast folds becomes irritated and red, will send cream for prn use, discussed ways to keep area dry, pt verbalized understanding.   Agreeable to PCV 20 today. Hm topics reviewed and up to date.     2024  Pt here today via virtual for lab review for HLD; FLP at goal today, pt reports compliance with atorvastatin 20 mg every evening. Denies any issues or concerns with the medications at this time, reviewed and refilled appropriately. Will f/u with pt for yearly wellness next year and prn. Denies any acute issues or concerns today.             Review of Systems     Review of Systems   Constitutional: Negative.    HENT: Negative.     Eyes: Negative.    Respiratory: Negative.     Cardiovascular: Negative.     Gastrointestinal: Negative.    Endocrine: Negative.    Genitourinary: Negative.    Neurological: Negative.    Psychiatric/Behavioral: Negative.           Medications and Allergies     Medications  Current Outpatient Medications   Medication Instructions    atorvastatin (LIPITOR) 40 mg, Oral, Nightly, For High Cholesterol    diclofenac (VOLTAREN) 75 mg, Oral, 2 times daily PRN    diclofenac sodium (VOLTAREN) 2 g, Topical (Top), 4 times daily PRN         Allergies  Review of patient's allergies indicates:  No Known Allergies    Physical Examination     There were no vitals taken for this visit.    Physical Exam  HENT:      Right Ear: Hearing normal.      Left Ear: Hearing normal.   Neurological:      Mental Status: He is alert and oriented to person, place, and time.   Psychiatric:         Mood and Affect: Mood normal.           Results     Lab Results   Component Value Date    WBC 11.91 (H) 06/18/2024    RBC 5.30 06/18/2024    HGB 15.3 06/18/2024    HCT 44.7 06/18/2024    MCV 84.3 06/18/2024    MCH 28.9 06/18/2024    MCHC 34.2 06/18/2024    RDW 12.4 06/18/2024     06/18/2024    MPV 10.4 06/18/2024     CMP  Sodium   Date Value Ref Range Status   08/13/2024 141 136 - 145 mmol/L Final     Potassium   Date Value Ref Range Status   08/13/2024 3.6 3.5 - 5.1 mmol/L Final     Chloride   Date Value Ref Range Status   08/13/2024 108 (H) 98 - 107 mmol/L Final     CO2   Date Value Ref Range Status   08/13/2024 25 22 - 29 mmol/L Final     Blood Urea Nitrogen   Date Value Ref Range Status   08/13/2024 7.8 (L) 8.9 - 20.6 mg/dL Final     Creatinine   Date Value Ref Range Status   08/13/2024 1.02 0.73 - 1.18 mg/dL Final     Calcium   Date Value Ref Range Status   08/13/2024 9.5 8.4 - 10.2 mg/dL Final     Albumin   Date Value Ref Range Status   06/18/2024 4.2 3.5 - 5.0 g/dL Final     Bilirubin Total   Date Value Ref Range Status   06/18/2024 0.6 <=1.5 mg/dL Final     ALP   Date Value Ref Range Status   06/18/2024 85 40 - 150  unit/L Final     AST   Date Value Ref Range Status   06/18/2024 21 5 - 34 unit/L Final     ALT   Date Value Ref Range Status   06/18/2024 18 0 - 55 unit/L Final     Estimated GFR-Non    Date Value Ref Range Status   03/29/2022 >60       Lab Results   Component Value Date    CHOL 140 08/13/2024     Lab Results   Component Value Date    HDL 46 08/13/2024     Lab Results   Component Value Date    TRIG 83 08/13/2024     Lab Results   Component Value Date    VLDL 17 08/13/2024     Lab Results   Component Value Date    LDL 77.00 08/13/2024     Lab Results   Component Value Date    TSH 0.958 06/12/2024         Assessment        ICD-10-CM ICD-9-CM   1. Mixed hyperlipidemia  E78.2 272.2        Plan      Problem List Items Addressed This Visit          Cardiac/Vascular    Mixed hyperlipidemia    Current Assessment & Plan     FLP At Goal  Continue RX atorvastatin to 20 mg q hs    Follow a low cholesterol, low saturated fat diet with less than 200 mg of cholesterol a day.   Avoid fried foods and high saturated fats (rodríguez, sausage, cookies, cakes, chips, cheese, whole milk, butter, mayonnaise, creamy dressings, gravy, stew, gumbo, boudin, cracklins and cream sauces).  Add flax seed or fish oil supplements to diet.   Increase dietary fiber.   Regular exercise improves cholesterol levels.  Physical activity 5 times a week for 30 minutes per day (or 150 minutes per week).   Stressed importance of dietary modifications.    Lab Results   Component Value Date    CHOL 140 08/13/2024     Lab Results   Component Value Date    HDL 46 08/13/2024     Lab Results   Component Value Date    TRIG 83 08/13/2024     Lab Results   Component Value Date    VLDL 17 08/13/2024     Lab Results   Component Value Date    LDL 77.00 08/13/2024              Relevant Medications    atorvastatin (LIPITOR) 40 MG tablet       Future Appointments   Date Time Provider Department Center   10/1/2024  8:00 AM Kristina Tracy NP [facility]  [place]         Follow up in about 10 months (around 6/16/2025) for Wellness.      Signature:     [facility]  [address] [facility]  [place] [address]    Date of encounter: 8/14/24      Audio Only Telehealth Visit     The patient location is: home  The chief complaint leading to consultation is: HLD F/U  Visit type: Virtual visit with audio only (telephone)  Total time spent with patient: 15     The reason for the audio only service rather than synchronous audio and video virtual visit was related to technical difficulties or patient preference/necessity.     Each patient to whom I provide medical services by telemedicine is:  (1) informed of the relationship between the physician and patient and the respective role of any other health care provider with respect to management of the patient; and (2) notified that they may decline to receive medical services by telemedicine and may withdraw from such care at any time. Patient verbally consented to receive this service via voice-only telephone call.         This service was not originating from a related E/M service provided within the previous 7 days nor will  to an E/M service or procedure within the next 24 hours or my soonest available appointment.  Prevailing standard of care was able to be met in this audio-only visit.

## 2024-09-15 ENCOUNTER — HOSPITAL ENCOUNTER (EMERGENCY)
Facility: HOSPITAL | Age: 34
Discharge: HOME | End: 2024-09-15
Attending: EMERGENCY MEDICINE
Payer: MEDICAID

## 2024-09-15 VITALS
HEART RATE: 70 BPM | SYSTOLIC BLOOD PRESSURE: 136 MMHG | DIASTOLIC BLOOD PRESSURE: 79 MMHG | HEIGHT: 71 IN | OXYGEN SATURATION: 99 % | WEIGHT: 240.31 LBS | BODY MASS INDEX: 33.64 KG/M2 | TEMPERATURE: 97 F | RESPIRATION RATE: 20 BRPM

## 2024-09-15 DIAGNOSIS — R05.9 COUGH: ICD-10-CM

## 2024-09-15 PROCEDURE — 99283 EMERGENCY DEPT VISIT LOW MDM: CPT | Mod: 25

## 2024-09-15 RX ORDER — BENZONATATE 100 MG/1
100 CAPSULE ORAL 3 TIMES DAILY PRN
Qty: 20 CAPSULE | Refills: 0 | Status: SHIPPED | OUTPATIENT
Start: 2024-09-15 | End: 2024-09-25

## 2024-09-15 NOTE — ED PROVIDER NOTES
ED PROVIDER NOTE  9/15/2024    CHIEF COMPLAINT:   Chief Complaint   Patient presents with    nasal drainage    Cough     C/o above complaints x 2 days. States productive yellow mucous cough       HISTORY OF PRESENT ILLNESS:   Figueroa Murcia is a 34 y.o. male who presents with chief complaint Cough.  Onset was 3 days ago whenever he began having cough productive of yellowish sputum.  Associated symptoms of runny nose and throat pain that he states is only present when he coughs.  Denies fever, chest pain, shortness of breath, nausea, vomiting.  States he needs a work excuse because he was not able to make it to work this morning.    The history is provided by the patient.         REVIEW OF SYSTEMS: as noted in the HPI.  NURSING NOTES REVIEWED      PAST MEDICAL/SURGICAL HISTORY:   Past Medical History:   Diagnosis Date    At risk for alteration in comfort     Mixed hyperlipidemia     Obesity, unspecified     Tobacco abuse       Past Surgical History:   Procedure Laterality Date    arm repair Right     LAPAROSCOPIC APPENDECTOMY N/A 10/22/2022    Procedure: APPENDECTOMY, LAPAROSCOPIC;  Surgeon: Vinay Sims Jr., MD;  Location: [facility];  Service: General;  Laterality: N/A;       FAMILY HISTORY:   Family History   Problem Relation Name Age of Onset    Hypertension Mother Taya     No Known Problems Father         SOCIAL HISTORY:   Social History     Tobacco Use    Smoking status: Every Day     Current packs/day: 0.50     Average packs/day: 0.5 packs/day for 17.7 years (8.9 ttl pk-yrs)     Types: Cigarettes     Start date: 2007    Smokeless tobacco: Never   Substance Use Topics    Alcohol use: Not Currently     Comment: occasionally    Drug use: Not Currently     Types: Marijuana     Comment: 4 blunts per day       ALLERGIES: Review of patient's allergies indicates:  No Known Allergies    PHYSICAL EXAM:  Initial Vitals [09/15/24 0720]   BP Pulse Resp Temp SpO2   136/79 70 20 97.3 °F (36.3 °C) 99 %      MAP       --          Physical Exam    Nursing note and vitals reviewed.  Constitutional: He appears well-developed and well-nourished. No distress.   HENT:   Head: Normocephalic and atraumatic.   Nose: Nose normal.   Mouth/Throat: Oropharynx is clear and moist and mucous membranes are normal.   Eyes: Conjunctivae and EOM are normal. Pupils are equal, round, and reactive to light.   Neck: Neck supple. No tracheal deviation present.   Cardiovascular:  Normal rate, regular rhythm, normal heart sounds, intact distal pulses and normal pulses.           Pulmonary/Chest: Effort normal and breath sounds normal. No respiratory distress.   Abdominal: Abdomen is soft. There is no abdominal tenderness. There is no rebound and no guarding.   Musculoskeletal:         General: Normal range of motion.      Cervical back: Neck supple.     Neurological: He is alert and oriented to person, place, and time. GCS eye subscore is 4. GCS verbal subscore is 5. GCS motor subscore is 6.   CN II-XII intact. Moves all extremities. No gross sensory or motor deficits.   Skin: Skin is warm, dry and intact.   Psychiatric: He has a normal mood and affect. His speech is normal and behavior is normal. Judgment and thought content normal. Cognition and memory are normal.         RESULTS:  Labs Reviewed - No data to display  Imaging Results              X-Ray Chest PA And Lateral (Final result)  Result time 09/15/24 08:01:24      Final result by Martell Tellez MD (09/15/24 08:01:24)                   Impression:      No acute cardiopulmonary process identified.      Electronically signed by: Martell Tellez  Date:    09/15/2024  Time:    08:01               Narrative:    EXAMINATION:  XR CHEST PA AND LATERAL    CLINICAL HISTORY:  Cough, unspecified    TECHNIQUE:  Two-view    COMPARISON:  April 18, 2022.    FINDINGS:  Cardiopericardial silhouette is within normal limits. Lungs are without dense focal or segmental consolidation, congestion, pleural effusion or  pneumothorax.                        Wet Read by Tristin Padgett DO (09/15/24 07:38:27, [facility], Emergency Medicine)    Normal cardiomediastinal silhouette.  No dense lobar consolidation or pneumothorax.                                    PROCEDURES:  Procedures    ECG:       ED COURSE AND MEDICAL DECISION MAKING:  Medications - No data to display        Medical Decision Making  Well-appearing 34-year-old male who presents with cough for the past 3 days, denies fever, chest pain, shortness of breath.  Differential diagnosis includes flu, COVID, pneumonia, bronchitis.  Patient wanted to make sure he would get a work excuse for missing work today.  Chest x-ray unremarkable.  We will discharge with Tessalon Perles.  Given strict ED return precautions. I have spoken with the patient and/or caregivers. I have explained the patient's condition, diagnoses and treatment plan based on the information available to me at this time. I have answered the patient's and/or caregiver's questions and addressed any concerns. The patient and/or caregivers have as good an understanding of the patient's diagnosis, condition and treatment plan as can be expected at this point. The vital signs have been stable. The patient's condition is stable and appropriate for discharge from the emergency department.     The patient will pursue further outpatient evaluation with the primary care physician or other designated or consulting physician as outlined in the discharge instructions. The patient and/or caregivers are agreeable to this plan of care and follow-up instructions have been explained in detail. The patient and/or caregivers have received these instructions in written format and have expressed an understanding of the discharge instructions. The patient and/or caregivers are aware that any significant change in condition or worsening of symptoms should prompt an immediate return to this or the closest  emergency department or a call to 911.    Amount and/or Complexity of Data Reviewed  Radiology: ordered and independent interpretation performed.    Risk  OTC drugs.  Prescription drug management.  Diagnosis or treatment significantly limited by social determinants of health.        CLINICAL IMPRESSION:  1. Cough        DISPOSITION:   ED Disposition Condition    Discharge             ED Prescriptions       Medication Sig Dispense Start Date End Date Auth. Provider    benzonatate (TESSALON) 100 MG capsule Take 1 capsule (100 mg total) by mouth 3 (three) times daily as needed for Cough. 20 capsule 9/15/2024 9/25/2024 Tristin Padgett,           Follow-up Information       Follow up With Specialties Details Why Contact Info    Aida Jackson, FNP Internal Medicine Schedule an appointment as soon as possible for a visit   [address] [facility] [address]  [phone]      [facility] Emergency Medicine  If symptoms worsen Count includes the [facility] [id number] [phone]               Tristin Padgett DO  09/15/24 0804

## 2024-09-15 NOTE — Clinical Note
"Figueroa"Wesley Murcia was seen and treated in our emergency department on 9/15/2024.  He may return to work on 09/15/2024.       If you have any questions or concerns, please don't hesitate to call.      Tristin Padgett, DO"

## 2024-09-16 PROBLEM — Z00.00 WELLNESS EXAMINATION: Status: RESOLVED | Noted: 2023-05-18 | Resolved: 2024-09-16

## 2024-10-01 ENCOUNTER — OFFICE VISIT (OUTPATIENT)
Dept: ORTHOPEDICS | Facility: CLINIC | Age: 34
End: 2024-10-01
Payer: MEDICAID

## 2024-10-01 VITALS
TEMPERATURE: 98 F | WEIGHT: 244.94 LBS | HEART RATE: 61 BPM | DIASTOLIC BLOOD PRESSURE: 76 MMHG | BODY MASS INDEX: 34.29 KG/M2 | SYSTOLIC BLOOD PRESSURE: 118 MMHG | HEIGHT: 71 IN

## 2024-10-01 DIAGNOSIS — M17.11 PRIMARY OSTEOARTHRITIS OF RIGHT KNEE: Primary | ICD-10-CM

## 2024-10-01 DIAGNOSIS — R20.0 RIGHT LEG NUMBNESS: ICD-10-CM

## 2024-10-01 PROCEDURE — 3078F DIAST BP <80 MM HG: CPT | Mod: CPTII,,, | Performed by: NURSE PRACTITIONER

## 2024-10-01 PROCEDURE — 3074F SYST BP LT 130 MM HG: CPT | Mod: CPTII,,, | Performed by: NURSE PRACTITIONER

## 2024-10-01 PROCEDURE — 99214 OFFICE O/P EST MOD 30 MIN: CPT | Mod: S$PBB,,, | Performed by: NURSE PRACTITIONER

## 2024-10-01 PROCEDURE — 3008F BODY MASS INDEX DOCD: CPT | Mod: CPTII,,, | Performed by: NURSE PRACTITIONER

## 2024-10-01 PROCEDURE — 1159F MED LIST DOCD IN RCRD: CPT | Mod: CPTII,,, | Performed by: NURSE PRACTITIONER

## 2024-10-01 PROCEDURE — 1160F RVW MEDS BY RX/DR IN RCRD: CPT | Mod: CPTII,,, | Performed by: NURSE PRACTITIONER

## 2024-10-01 PROCEDURE — 99214 OFFICE O/P EST MOD 30 MIN: CPT | Mod: PBBFAC | Performed by: NURSE PRACTITIONER

## 2024-10-01 PROCEDURE — 3044F HG A1C LEVEL LT 7.0%: CPT | Mod: CPTII,,, | Performed by: NURSE PRACTITIONER

## 2024-10-01 NOTE — PROGRESS NOTES
"   [facility]  Subjective:   PATIENT ID: Figueroa Murcia is a 34 y.o. male.   CHIEF COMPLAINT: Knee Pain of the Right Knee (Here with Rt knee pain . Pain Level 9 and constant. Pt says his whole leg hurts. C/O Tingling)    HPI:  Right knee pain medial stiffness and aching    Injury/ Surgical HX r/t CC:  no HX of prior significant joint injury   Onset: several years  fluctuates    Modifying Factors: exacerbated by:  increased activity, prolonged sitting, prolonged walking/ standing improved with:  movement in less than 30 minutes , rest   Associated Symptoms:  [] joint locking  [] joint catching  [x] decreased ROM  [x] crepitus [x] Weakness/ "giving out"  [] falls  [] swelling  [x] difficult sleeping s/t pain        Activity: sedentary with light activity and pain moderately interferes with ADLs, assistive device none   Previous Treatments:  [x] HEP > 6 weeks  [] RX PT   [] Off-loading brace  [x] Soft knee splint [x] OTC Pain Relievers: Tylenol, ibuprofen  [x] RX Medications: po diclofenac, topical diclofenac  [] CSI  [] Hyaluronic acid injections   PMH:  smoker and obesity, DX irritability and anger   Family History: + OA   NOTE:  Follow up, seen by me since 2024  for right knee mild DJD.  HEP and topical diclofenac  given 8/1/24 with minimal relief.  Patient most concerned with continued numbness and tingling of diffuse right LE and into toes.  Having mild joint pain of knee.   Employment HX: [employer], currently employed.      Current Outpatient Medications:     atorvastatin (LIPITOR) 40 MG tablet, Take 1 tablet (40 mg total) by mouth every evening. For High Cholesterol, Disp: 90 tablet, Rfl: 4    diclofenac (VOLTAREN) 75 MG EC tablet, Take 1 tablet (75 mg total) by mouth 2 (two) times daily as needed (Knee pain)., Disp: 60 tablet, Rfl: 5  Review of patient's allergies indicates:  No Known Allergies  REVIEW OF SYSTEMS:  A ten-point review of systems was " "performed and is negative, except as mentioned above   Objective:   Body mass index is 34.16 kg/m².   Vitals:    10/01/24 0825 10/01/24 0827   BP: 118/76    Pulse: 61    Temp: 98.4 °F (36.9 °C)    TempSrc: Oral    Weight: 111.1 kg (244 lb 14.9 oz)    Height: 5' 11" (1.803 m)    PainSc:    9     MSK Knee Exam  General:  no apparent distress, no pain indicators,  obesity  Inspection: lower extremities in proportion with overall body habitus, no erythema   ,  no erythema, limping gait w/ full weight full  LEFT KNEE RIGHT KNEE   [] Swelling  [] Varus deformity  [] Rash [] Contusion  [] Mass  [] Scars [] Swelling mild  [x] Mild varus deformity  [] Rash [] Contusion  [] Mass  [] Scars   Palpation:     LEFT KNEE RIGHT KNEE   Joint Warmth: normal  POMT: none  [] Patellar grind with compression  [] Crepitus  [] Joint effusion  [] Quad atrophy  [] Active mechanical locking with joint movement  [] Popliteal fullness  [] Tenderness medial joint line  [] Tenderness lateral joint line  [] Tenderness of tibial plateau   [] Tenderness of patellar tendon  [] Tenderness of quadriceps tendon  [] Tenderness of prepatellar   [] Tenderness of infrapatellar  [] Tenderness of anserine bursae  [] Tenderness of patellar facet  [] Tenderness over lateral retinaculum Joint Warmth: normal  POMT: none  [] Patellar grind with compression  [x] Crepitus  [] Joint effusion  [] Quad atrophy  [] Active mechanical locking with joint movement  [] Popliteal fullness  [] Tenderness medial joint line  [] Tenderness lateral joint line  [] Tenderness of tibial plateau   [] Tenderness of patellar tendon  [] Tenderness of quadriceps tendon  [] Tenderness of prepatellar  [] Tenderness of infrapatellar  [] Tenderness of anserine bursae  [] Tenderness of patellar facet  [] Tenderness over lateral retinaculum   ROM Active Flexion / Extension (0-140)  Left 0 / 130 w/o pain Right 0 / 121 w/ pain   Strength Flexion / Extension (5 / 5)  Left 5 / 5 Right 5 / 5 "     Special Testing:         Not  Tested IT Band Syndrome L+ L-- R+ R--    [] Diane's Test [] [x] [] [x]     Joint Effusion        [] Ballotable Effusion [] [x] [] [x]    [] Fluid Wave [] [x] [] [x]     Patellar Testing        [] Apprehension [] [x] [] [x]     Meniscal Injury        [] Magdalena's Test [] [x] [x] []    [x] Thessaly's Test [] [] [] []     Ligament Injury        [] ACL Anterior Drawer [] [x] [] [x]    [] PCL Posterior Drawer [] [x] [] [x]    [] LCL Varus Test [] [x] [] [x]    [] MCL Valgus Test [] [x] [] [x]    Straight leg raise- positive  Hip Exam normal  Ankle Exam normal  Neurovascular: Intact to light touch  Neuro/ Psych: Awake, alert, oriented, normal mood and affect  Lymphatic: No LAD  Assessment:   IMAGING:  XR noted in EMR dated 8/1/24  4 views of right knee reviewed and independently interpreted by me with noted no acute findings noted, findings suggestive of arthritic changes, medial joint space narrowing.  Radiologist findings reviewed.  Findings discussed with patient today.    EXAMINATION: XR KNEE COMP 4 OR MORE VIEWS RIGHT 8/1/24  CLINICAL HISTORY:  Unilateral primary osteoarthritis, right knee  COMPARISON:  None.  FINDINGS:  No acute displaced fractures or dislocations.  There is narrowing of the lateral compartment of the knee joint with sclerosis and marginal osteophytosis degenerative changes identified of the patellofemoral joint articular spaces are otherwise preserved with smooth articular surfaces  No blastic or lytic lesions.  Soft tissues within normal limits.  Impression:  Degenerative changes.    MRI/CT: none    LABS:   Hemoglobin A1c   Date Value Ref Range Status   06/12/2024 5.4 <=7.0 % Final   05/18/2023 5.0 <=7.0 % Final   07/21/2022 5.1 <=7.0 % Final     EMR REVIEW: completed with noted Referral documentation reviewed  Diagnosis & Treatment Plan:   DIAGNOSIS: Mild exacerbation of chronic Right Mild-to-moderate knee arthritis Kellgren-Eber Grade 2 complicated by  radiculopathy of right LE  1. Primary osteoarthritis of right knee    2. Right leg numbness    3. BMI 34.0-34.9,adult      TREATMENT PLAN:     Treatment plan:    RX PT  with controlled, progressive tendon loading and gradual resumption of activity.  instructed to contact insurance provider in order to obtain provider which takes patient's insurance  Patient verbalized understanding, agrees to plan and denies further questions.   Concerning LE numbness with positive leg raise: ENG study ordered for further definitive diagnose.  Patient aware ortho does not treat spine issues and instructed to f/u with PCP for further ziggy, work up and possible referral to neurology if found to be appropriate. Verbalized understanding and denies questions.   Ongoing education about DX, treatment recommendations and reasonable expectations including goal to decrease pain and increase function  Conservative treatments including, but limited to:  activity modification as needed, daily HEP with TheraBand, proper supportive footwear, non-impact muscle strengthening with use of stationary bike (RPM set at 80 or > with slow progression to goal of 40 minutes 3-4 times per week as tolerated), walking-aides as needed, adequate vit D/C, glucosamine 1500 mg/day and/or daily acetaminophen 1000 mg 3 times/ day if able to tolerate.    Weight management is paramount. Immediate BMI reduction goal 5-10% of body weight.  Achieving a BMI < 25 would be optimal for overall health and symptoms relief.   Patient aware condition has no cure and treatment plan is focused on management of symptoms.  Procedure: CSI v. VS injections discussed as treatment options in future if conservative treatments fail.   RX Medications: continue medications as RX per PCP .  RTC:      once RX PT completed if knee pain persist.   NOTE: None    Leah Menard-Neumann FNP [facility]  Procedure Note:   None  Time Based Billing   Total Time Spent with  Patient: 30 minutes   Visit Start Time: 0815  10 minutes spent prior to visit reviewing EMR, prior labs and x-rays  10 minutes spent in visit with patient face-to-face time completing exam, obtaining history, educating on DX and treatment plan.  10 minutes spent after visit completing EMR documentation.   Visit End Time: 0845    *Please be aware that this note has been generated with the assistance of MModal voice-to-text.  Please excuse any spelling or grammatical errors. Positive findings indicted by checkmark*

## 2024-10-30 ENCOUNTER — PATIENT MESSAGE (OUTPATIENT)
Dept: RESEARCH | Facility: HOSPITAL | Age: 34
End: 2024-10-30
Payer: MEDICAID

## 2024-11-04 ENCOUNTER — PATIENT MESSAGE (OUTPATIENT)
Dept: RESEARCH | Facility: HOSPITAL | Age: 34
End: 2024-11-04
Payer: MEDICAID

## 2024-11-05 ENCOUNTER — PATIENT MESSAGE (OUTPATIENT)
Dept: RESEARCH | Facility: HOSPITAL | Age: 34
End: 2024-11-05
Payer: MEDICAID

## 2025-03-11 ENCOUNTER — OFFICE VISIT (OUTPATIENT)
Dept: URGENT CARE | Facility: CLINIC | Age: 35
End: 2025-03-11
Payer: MEDICAID

## 2025-03-11 VITALS
WEIGHT: 265.63 LBS | BODY MASS INDEX: 38.03 KG/M2 | RESPIRATION RATE: 20 BRPM | HEART RATE: 71 BPM | HEIGHT: 70 IN | DIASTOLIC BLOOD PRESSURE: 82 MMHG | OXYGEN SATURATION: 98 % | SYSTOLIC BLOOD PRESSURE: 127 MMHG | TEMPERATURE: 98 F

## 2025-03-11 DIAGNOSIS — G89.29 CHRONIC DENTAL PAIN: Primary | ICD-10-CM

## 2025-03-11 DIAGNOSIS — K08.9 CHRONIC DENTAL PAIN: Primary | ICD-10-CM

## 2025-03-11 PROCEDURE — 99214 OFFICE O/P EST MOD 30 MIN: CPT | Mod: PBBFAC | Performed by: NURSE PRACTITIONER

## 2025-03-11 PROCEDURE — 63600175 PHARM REV CODE 636 W HCPCS: Mod: JZ,TB

## 2025-03-11 PROCEDURE — 99213 OFFICE O/P EST LOW 20 MIN: CPT | Mod: S$PBB,,, | Performed by: NURSE PRACTITIONER

## 2025-03-11 RX ORDER — IBUPROFEN 800 MG/1
800 TABLET ORAL 3 TIMES DAILY
Qty: 15 TABLET | Refills: 0 | Status: SHIPPED | OUTPATIENT
Start: 2025-03-11 | End: 2025-03-16

## 2025-03-11 RX ORDER — KETOROLAC TROMETHAMINE 30 MG/ML
60 INJECTION, SOLUTION INTRAMUSCULAR; INTRAVENOUS
Status: COMPLETED | OUTPATIENT
Start: 2025-03-11 | End: 2025-03-11

## 2025-03-11 RX ADMIN — KETOROLAC TROMETHAMINE 60 MG: 30 INJECTION, SOLUTION INTRAMUSCULAR at 07:03

## 2025-03-12 NOTE — PROGRESS NOTES
"Subjective:      Patient ID: Figueroa Murcia is a 34 y.o. male.    Vitals:  height is 5' 10" (1.778 m) and weight is 120.5 kg (265 lb 10.5 oz). His temperature is 98.4 °F (36.9 °C). His blood pressure is 127/82 and his pulse is 71. His respiration is 20 and oxygen saturation is 98%.     Chief Complaint: Dental Pain (LT upper x 3days )    Dental Pain      As stated in CC. Pt reports he has been taking tylenol 3 for pain with minimal relief. Pt denies fever, headache, dizziness, stomach pain, shortness of breath or chest pain, stomach pain, n/v/d   ROS   Objective:     Physical Exam   Constitutional: He is oriented to person, place, and time. He appears well-developed.  Non-toxic appearance. He does not appear ill. No distress.   HENT:   Head: Atraumatic.   Nose: No purulent discharge. Right sinus exhibits no maxillary sinus tenderness and no frontal sinus tenderness. Left sinus exhibits no maxillary sinus tenderness and no frontal sinus tenderness.   Mouth/Throat: Uvula is midline. Mucous membranes are moist. No oral lesions. Abnormal dentition. Dental caries present. No dental abscesses or uvula swelling. Posterior oropharyngeal erythema present. No oropharyngeal exudate, tonsillar abscesses or cobblestoning.   Very poor dentition throughout mouth, there is some gingival disease as it is evidenced by missing teeth on top of bottom.  Patient has left upper partially fractured teeth at 10 and 11 and missing teeth of 13 14 15 16. No dental abscess, appears consistent with chronic dental decay      Comments: Very poor dentition throughout mouth, there is some gingival disease as it is evidenced by missing teeth on top of bottom.  Patient has left upper partially fractured teeth at 10 and 11 and missing teeth of 13 14 15 16. No dental abscess, appears consistent with chronic dental decay  Eyes: Conjunctivae are normal. Right eye exhibits no discharge. Left eye exhibits no discharge. Extraocular movement intact   Neck: Neck " supple. No neck rigidity present.   Cardiovascular: Regular rhythm.   Pulmonary/Chest: Effort normal and breath sounds normal. No respiratory distress. He has no wheezes. He has no rhonchi. He has no rales.   Lymphadenopathy:     He has no cervical adenopathy.   Neurological: He is alert and oriented to person, place, and time.   Skin: Skin is warm, dry and not diaphoretic. Capillary refill takes less than 2 seconds.   Psychiatric: His behavior is normal. Mood, judgment and thought content normal.   Nursing note and vitals reviewed.chaperone present (Wife)         Assessment:     1. Chronic dental pain        Plan:   ER precautions given and discussed  No indication for antibiotics today, no dental abscess noted upon exam Given motrin for pain. Magic mouth was given for mouth pain.   Given provider list and instructed to seek dental evaluation from provider with walk-in capability.   Chronic dental pain  -     ketorolac injection 60 mg  -     (Magic mouthwash) 1:1:1 diphenhydrAMINE(Benadryl) 12.5mg/5ml liq, aluminum & magnesium hydroxide-simethicone (Maalox), LIDOcaine viscous 2%; Swish and spit 5 mLs every 4 (four) hours as needed (swish and spit for dental or mouth pain). for mouth sores  Dispense: 360 mL; Refill: 0  -     ibuprofen (ADVIL,MOTRIN) 800 MG tablet; Take 1 tablet (800 mg total) by mouth 3 (three) times daily. for 5 days  Dispense: 15 tablet; Refill: 0

## 2025-03-12 NOTE — PATIENT INSTRUCTIONS
Please follow instructions on patient education material.  Return to urgent care in 2 to 3 days if symptoms are not improving, immediately if you develop any new or worsening symptoms.     Take your prescriptions according to the directions on the labels and make sure you keep your upcoming dental appointment or obtain an appointment  as priority  Please read the attached information about NSAIDs.    Please use the prescriptions that were sent for you.     You got a Toradol shot in clinic today. For the next 8 hours do not take:  Ibuprofen  Naproxen  Advil  Aleve  Motrin  Ketorolac   Diclofenac  Meloxicam   You can take Tylenol as well. Up to 1,000mg at a time, up to 4 times per day.  Also consider Oragel.  Soft foods or liquids.  Stop smoking if you smoke.  Avoid sugar.    Mouthwash that does not contain alcohol.

## 2025-05-17 ENCOUNTER — HOSPITAL ENCOUNTER (EMERGENCY)
Facility: HOSPITAL | Age: 35
Discharge: HOME | End: 2025-05-17
Attending: EMERGENCY MEDICINE
Payer: MEDICAID

## 2025-05-17 VITALS
BODY MASS INDEX: 33.95 KG/M2 | HEART RATE: 56 BPM | WEIGHT: 264.56 LBS | SYSTOLIC BLOOD PRESSURE: 152 MMHG | HEIGHT: 74 IN | RESPIRATION RATE: 16 BRPM | OXYGEN SATURATION: 99 % | TEMPERATURE: 98 F | DIASTOLIC BLOOD PRESSURE: 98 MMHG

## 2025-05-17 DIAGNOSIS — K02.9 PAIN DUE TO DENTAL CARIES: ICD-10-CM

## 2025-05-17 DIAGNOSIS — K04.7 DENTAL ABSCESS: Primary | ICD-10-CM

## 2025-05-17 PROCEDURE — 99284 EMERGENCY DEPT VISIT MOD MDM: CPT

## 2025-05-17 RX ORDER — ACETAMINOPHEN AND CODEINE PHOSPHATE 300; 30 MG/1; MG/1
1 TABLET ORAL EVERY 6 HOURS PRN
Qty: 12 TABLET | Refills: 0 | Status: SHIPPED | OUTPATIENT
Start: 2025-05-17 | End: 2025-05-20

## 2025-05-17 RX ORDER — IBUPROFEN 800 MG/1
800 TABLET, FILM COATED ORAL EVERY 6 HOURS PRN
Qty: 20 TABLET | Refills: 0 | Status: SHIPPED | OUTPATIENT
Start: 2025-05-17

## 2025-05-17 RX ORDER — CHLORHEXIDINE GLUCONATE ORAL RINSE 1.2 MG/ML
15 SOLUTION DENTAL 2 TIMES DAILY
Qty: 473 ML | Refills: 0 | Status: SHIPPED | OUTPATIENT
Start: 2025-05-17 | End: 2025-06-02

## 2025-05-17 RX ORDER — AMOXICILLIN 875 MG/1
875 TABLET, COATED ORAL 2 TIMES DAILY
Qty: 14 TABLET | Refills: 0 | Status: SHIPPED | OUTPATIENT
Start: 2025-05-17

## 2025-05-17 NOTE — DISCHARGE INSTRUCTIONS
DENTAL RESOURCES    CLINIC ADDRESS PHONE # INSURANCE   [facility] [address] [place] [address] [phone] Medicaid/Medicare   Dr. Vinay Neri, DDS  [address] [place] [address] [phone] Medicaid   [facility] [address] [address] [phone] Medicaid   [facility] [address] [place] [address] [phone] Medicare [facility]  [address] [place] [address] [phone] Medicaid/Medicare   [facility] [address] [place] [address] [phone] Medicaid   [facility]  [address]  [place], [place] [id number] [phone] Medicaid for ages 5 and under; or for lip/tongue tie    Dr. Don More, DDS [address] [place] [address] [phone] Medicaid-only for children ages 2 to 21   [facility] [address]  [place] [address] [phone] Medicaid   [facility] [address] [place] [address] [phone] Medicare [facility] [address] [place] [address] [phone] Medicaid   [facility] [address] [place] [address] [phone] Medicaid-Pediatric dentistry    [facility] [address] [place] [address] [phone] Medicaid/Medicare   [facility] [address] [place] [address] [phone] Medicaid/Medicare   [facility] [address]  [place] [address] [phone] Medicaid/Medicare   [facility] [address] [place] [address] [phone] Medicaid/Medicare   [facility] [address] [place] [address] [phone] Medicaid/Medicare   [facility] [address] [address] [phone] Medicaid-Pediatric and adult   [facility] [address]  [place] [address] [phone] Medicaid   [facility] [address]  [place], [place] [address] [phone] Medicare   Dr. Brooke Mcarthur,  [place] [address] [phone] Medicare for dentures only       It is important that you follow up with your primary care provider or specialist if indicated for further evaluation, workup, and treatment as necessary. The exam and treatment you received in Emergency Department was for an urgent problem and NOT INTENDED AS COMPLETE CARE. It is important that you FOLLOW UP with a doctor for ongoing care. If your symptoms become WORSE or you DO NOT IMPROVE and you are unable to reach your health care provider, you should RETURN to the Emergency Department. The Emergency Department provider has provided a PRELIMINARY INTERPRETATION of all your studies. A final interpretation may be done after you are discharged. If a change in your diagnosis or treatment is needed WE WILL CONTACT YOU. It is critical that we have a CURRENT PHONE NUMBER FOR YOU.

## 2025-05-18 NOTE — ED PROVIDER NOTES
Encounter Date: 5/17/2025       History     Chief Complaint   Patient presents with    Dental Pain     Pt reports left upper molar pain x 2 days.      A 35 y.o. male patient with a history of HLD presents to the ED with left upper molar pain. The onset is 2 days ago. Patient admits to having a broken tooth in area of pain. Denies any fever, chills, N/V, trismus, drooling, vision changes, loss of ROM of neck.       The history is provided by the patient.     Review of patient's allergies indicates:  No Known Allergies  Past Medical History:   Diagnosis Date    At risk for alteration in comfort     Mixed hyperlipidemia     Obesity, unspecified     Tobacco abuse      Past Surgical History:   Procedure Laterality Date    arm repair Right     LAPAROSCOPIC APPENDECTOMY N/A 10/22/2022    Procedure: APPENDECTOMY, LAPAROSCOPIC;  Surgeon: Vinay Sims Jr., MD;  Location: [facility];  Service: General;  Laterality: N/A;     Family History   Problem Relation Name Age of Onset    Hypertension Mother Taya     No Known Problems Father       Social History[1]  Review of Systems   Constitutional:  Negative for chills and fever.   HENT:  Positive for dental problem.    Eyes:  Negative for visual disturbance.   Respiratory:  Negative for shortness of breath.    Cardiovascular:  Negative for chest pain.   Gastrointestinal:  Negative for nausea and vomiting.   Genitourinary:  Negative for difficulty urinating and dysuria.   Musculoskeletal:  Negative for arthralgias.   Skin:  Negative for color change and rash.   Neurological:  Negative for weakness and headaches.   Hematological:  Does not bruise/bleed easily.   Psychiatric/Behavioral:  Negative for confusion.    All other systems reviewed and are negative.      Physical Exam     Initial Vitals [05/17/25 1813]   BP Pulse Resp Temp SpO2   (!) 152/98 (!) 56 16 98.2 °F (36.8 °C) 99 %      MAP       --         Physical Exam    Nursing note and vitals reviewed.  Constitutional: He  appears well-developed and well-nourished.   HENT:   Head: Normocephalic and atraumatic.   Right Ear: External ear normal.   Left Ear: External ear normal.   Nose: Nose normal. Mouth/Throat: Dental abscesses and dental caries present.       Eyes: Conjunctivae and EOM are normal.   Neck: Neck supple.   Cardiovascular:  Normal rate, regular rhythm and normal heart sounds.     Exam reveals no gallop and no friction rub.       No murmur heard.  Pulmonary/Chest: Breath sounds normal. No respiratory distress. He has no wheezes. He has no rhonchi. He has no rales.   Abdominal: He exhibits no distension.   Musculoskeletal:      Cervical back: Neck supple.     Neurological: He is alert and oriented to person, place, and time. GCS eye subscore is 4. GCS verbal subscore is 5. GCS motor subscore is 6.   Skin: Skin is warm and dry. Capillary refill takes less than 2 seconds.         ED Course   Procedures  Labs Reviewed - No data to display       Imaging Results    None          Medications - No data to display  Medical Decision Making  A 35 y.o. male patient with a history of HLD presents to the ED with left upper molar pain. The onset is 2 days ago. Patient admits to having a broken tooth in area of pain. Denies any fever, chills, N/V, trismus, drooling, vision changes, loss of ROM of neck.     Clinical impression:  Dental abscess (Primary)  Pain due to dental caries    Patient is non-toxic appearing and tolerating nutritional intake. Patient's vital signs and clinical condition are stable for DC with ED Prescriptions     Medication Sig Dispense Start Date End Date Auth. Provider    amoxicillin (AMOXIL) 875 MG tablet Take 1 tablet (875 mg total) by mouth   2 (two) times daily. 14 tablet 5/17/2025 -- Suzanne Nina PA    chlorhexidine (PERIDEX) 0.12 % solution Use as directed 15 mLs in the   mouth or throat 2 (two) times daily. for 16 days 473 mL 5/17/2025 6/2/2025   Suzanne Nina PA    acetaminophen-codeine 300-30mg  (TYLENOL #3) 300-30 mg Tab Take 1 tablet   by mouth every 6 (six) hours as needed (pain). 12 tablet 5/17/2025 5/20/2025 Suzanne Nina PA    ibuprofen (ADVIL,MOTRIN) 800 MG tablet Take 1 tablet (800 mg total) by   mouth every 6 (six) hours as needed for Pain. 20 tablet 5/17/2025 --   Suzanne Nina PA         Follow-up: PCP or Internal medicine clinic within 3 days  Referrals made: dental resources given    Strict follow-up precautions given. Patient verbalizes understanding of treatment plan and ED return precautions.         Risk  Prescription drug management.  Risk Details: Given strict ED return precautions. I have spoken with the patient and/or caregivers. I have explained the patient's condition, diagnoses and treatment plan based on the information available to me at this time. I have answered the patient's and/or caregiver's questions and addressed any concerns. The patient and/or caregivers have as good an understanding of the patient's diagnosis, condition and treatment plan as can be expected at this point. The patient's condition is stable and appropriate for discharge from the emergency department.      The patient will pursue further outpatient evaluation with the primary care physician or other designated or consulting physician as outlined in the discharge instructions. The patient and/or caregivers are agreeable to this plan of care and follow-up instructions have been explained in detail. The patient and/or caregivers have received these instructions in written format and have expressed an understanding of the discharge instructions. The patient and/or caregivers are aware that any significant change in condition or worsening of symptoms should prompt an immediate return to this or the closest emergency department or a call to 911.               Additional MDM:   Differential Diagnosis:   Other: The following diagnoses were also considered and will be evaluated: Dental caries, Dental  abscess and Dental fracture.                                   Clinical Impression:  Final diagnoses:  [K04.7] Dental abscess (Primary)  [K02.9] Pain due to dental caries          ED Disposition Condition    Discharge Stable          ED Prescriptions       Medication Sig Dispense Start Date End Date Auth. Provider    amoxicillin (AMOXIL) 875 MG tablet Take 1 tablet (875 mg total) by mouth 2 (two) times daily. 14 tablet 5/17/2025 -- Suzanne Nina PA    chlorhexidine (PERIDEX) 0.12 % solution Use as directed 15 mLs in the mouth or throat 2 (two) times daily. for 16 days 473 mL 5/17/2025 6/2/2025 Suzanne Nina PA    acetaminophen-codeine 300-30mg (TYLENOL #3) 300-30 mg Tab Take 1 tablet by mouth every 6 (six) hours as needed (pain). 12 tablet 5/17/2025 5/20/2025 Suzanne Nina PA    ibuprofen (ADVIL,MOTRIN) 800 MG tablet Take 1 tablet (800 mg total) by mouth every 6 (six) hours as needed for Pain. 20 tablet 5/17/2025 -- Suzanne Nina PA          Follow-up Information       Follow up With Specialties Details Why Contact Info    [facility] Dept Emergency Medicine Go to  If symptoms worsen, As needed [address] [facility] [address] [phone]    Dentist of your choice  Call in 2 days  See dental resource sheet for contact information of local dental clinics and what insurance they accept.    [facility]  Go to   [address] [place] [address]    [phone]    Free medical, dental, & vision clinic. Patient's are seen on first-come, first-serve basis.   Clinic hours: Tues, Wed, & Thurs; 8 am - 12 pm; 12:30 pm - 3:30 pm                 [1]   Social History  Tobacco Use    Smoking status: Every Day     Current packs/day: 0.50     Average packs/day: 0.5 packs/day for 18.4 years (9.2 ttl pk-yrs)     Types: Cigarettes     Start date: 2007    Smokeless tobacco: Never   Substance Use Topics    Alcohol use: Not Currently     Comment: occasionally    Drug use:  Not Currently     Types: Marijuana     Comment: 4 blunts per day        Suzanne Nina, PA  05/18/25 0048

## 2025-06-23 ENCOUNTER — HOSPITAL ENCOUNTER (EMERGENCY)
Facility: HOSPITAL | Age: 35
Discharge: LEFT BEFORE DISCHARGE | End: 2025-06-23
Payer: MEDICAID

## 2025-06-23 ENCOUNTER — HOSPITAL ENCOUNTER (EMERGENCY)
Facility: HOSPITAL | Age: 35
Discharge: HOME | End: 2025-06-23
Attending: FAMILY MEDICINE
Payer: MEDICAID

## 2025-06-23 VITALS
DIASTOLIC BLOOD PRESSURE: 95 MMHG | HEART RATE: 61 BPM | HEIGHT: 71 IN | TEMPERATURE: 98 F | RESPIRATION RATE: 18 BRPM | WEIGHT: 273 LBS | BODY MASS INDEX: 38.22 KG/M2 | SYSTOLIC BLOOD PRESSURE: 152 MMHG | OXYGEN SATURATION: 100 %

## 2025-06-23 VITALS
DIASTOLIC BLOOD PRESSURE: 95 MMHG | SYSTOLIC BLOOD PRESSURE: 143 MMHG | WEIGHT: 260 LBS | HEART RATE: 68 BPM | HEIGHT: 71 IN | OXYGEN SATURATION: 97 % | TEMPERATURE: 98 F | BODY MASS INDEX: 36.4 KG/M2 | RESPIRATION RATE: 18 BRPM

## 2025-06-23 DIAGNOSIS — K59.00 CONSTIPATION, UNSPECIFIED CONSTIPATION TYPE: Primary | ICD-10-CM

## 2025-06-23 LAB
ALBUMIN SERPL-MCNC: 3.6 G/DL (ref 3.5–5)
ALBUMIN/GLOB SERPL: 1 RATIO (ref 1.1–2)
ALP SERPL-CCNC: 70 UNIT/L (ref 40–150)
ALT SERPL-CCNC: 21 UNIT/L (ref 0–55)
ANION GAP SERPL CALC-SCNC: 7 MEQ/L
AST SERPL-CCNC: 24 UNIT/L (ref 11–45)
BACTERIA #/AREA URNS AUTO: ABNORMAL /HPF
BASOPHILS # BLD AUTO: 0.03 X10(3)/MCL
BASOPHILS NFR BLD AUTO: 0.3 %
BILIRUB SERPL-MCNC: 0.7 MG/DL
BILIRUB UR QL STRIP.AUTO: NEGATIVE
BUN SERPL-MCNC: 8.4 MG/DL (ref 8.9–20.6)
CALCIUM SERPL-MCNC: 8.9 MG/DL (ref 8.4–10.2)
CHLORIDE SERPL-SCNC: 109 MMOL/L (ref 98–107)
CLARITY UR: CLEAR
CO2 SERPL-SCNC: 24 MMOL/L (ref 22–29)
COLOR UR AUTO: ABNORMAL
CREAT SERPL-MCNC: 1.02 MG/DL (ref 0.72–1.25)
CREAT/UREA NIT SERPL: 8
EOSINOPHIL # BLD AUTO: 0.25 X10(3)/MCL (ref 0–0.9)
EOSINOPHIL NFR BLD AUTO: 2.8 %
ERYTHROCYTE [DISTWIDTH] IN BLOOD BY AUTOMATED COUNT: 12.1 % (ref 11.5–17)
GFR SERPLBLD CREATININE-BSD FMLA CKD-EPI: >60 ML/MIN/1.73/M2
GLOBULIN SER-MCNC: 3.7 GM/DL (ref 2.4–3.5)
GLUCOSE SERPL-MCNC: 104 MG/DL (ref 74–100)
GLUCOSE UR QL STRIP: NORMAL
HCT VFR BLD AUTO: 43.4 % (ref 42–52)
HGB BLD-MCNC: 14.9 G/DL (ref 14–18)
HGB UR QL STRIP: NEGATIVE
HOLD SPECIMEN: NORMAL
HYALINE CASTS #/AREA URNS LPF: ABNORMAL /LPF
IMM GRANULOCYTES # BLD AUTO: 0.03 X10(3)/MCL (ref 0–0.04)
IMM GRANULOCYTES NFR BLD AUTO: 0.3 %
KETONES UR QL STRIP: NEGATIVE
LEUKOCYTE ESTERASE UR QL STRIP: NEGATIVE
LIPASE SERPL-CCNC: 43 U/L
LYMPHOCYTES # BLD AUTO: 2.34 X10(3)/MCL (ref 0.6–4.6)
LYMPHOCYTES NFR BLD AUTO: 26.5 %
MCH RBC QN AUTO: 29 PG (ref 27–31)
MCHC RBC AUTO-ENTMCNC: 34.3 G/DL (ref 33–36)
MCV RBC AUTO: 84.6 FL (ref 80–94)
MONOCYTES # BLD AUTO: 0.51 X10(3)/MCL (ref 0.1–1.3)
MONOCYTES NFR BLD AUTO: 5.8 %
MUCOUS THREADS URNS QL MICRO: ABNORMAL /LPF
NEUTROPHILS # BLD AUTO: 5.68 X10(3)/MCL (ref 2.1–9.2)
NEUTROPHILS NFR BLD AUTO: 64.3 %
NITRITE UR QL STRIP: NEGATIVE
NRBC BLD AUTO-RTO: 0 %
PH UR STRIP: 6.5 [PH]
PLATELET # BLD AUTO: 215 X10(3)/MCL (ref 130–400)
PMV BLD AUTO: 10.2 FL (ref 7.4–10.4)
POTASSIUM SERPL-SCNC: 3.6 MMOL/L (ref 3.5–5.1)
PROT SERPL-MCNC: 7.3 GM/DL (ref 6.4–8.3)
PROT UR QL STRIP: NEGATIVE
RBC # BLD AUTO: 5.13 X10(6)/MCL (ref 4.7–6.1)
RBC #/AREA URNS AUTO: ABNORMAL /HPF
SODIUM SERPL-SCNC: 140 MMOL/L (ref 136–145)
SP GR UR STRIP.AUTO: 1.02 (ref 1–1.03)
SQUAMOUS #/AREA URNS LPF: ABNORMAL /HPF
UROBILINOGEN UR STRIP-ACNC: ABNORMAL
WBC # BLD AUTO: 8.84 X10(3)/MCL (ref 4.5–11.5)
WBC #/AREA URNS AUTO: ABNORMAL /HPF

## 2025-06-23 PROCEDURE — 99284 EMERGENCY DEPT VISIT MOD MDM: CPT | Mod: 25

## 2025-06-23 PROCEDURE — 85025 COMPLETE CBC W/AUTO DIFF WBC: CPT | Performed by: NURSE PRACTITIONER

## 2025-06-23 PROCEDURE — 96374 THER/PROPH/DIAG INJ IV PUSH: CPT

## 2025-06-23 PROCEDURE — 80053 COMPREHEN METABOLIC PANEL: CPT | Performed by: NURSE PRACTITIONER

## 2025-06-23 PROCEDURE — 81001 URINALYSIS AUTO W/SCOPE: CPT | Performed by: NURSE PRACTITIONER

## 2025-06-23 PROCEDURE — 63600175 PHARM REV CODE 636 W HCPCS: Mod: JZ,TB | Performed by: NURSE PRACTITIONER

## 2025-06-23 PROCEDURE — 99900041 HC LEFT WITHOUT BEING SEEN- EMERGENCY

## 2025-06-23 PROCEDURE — 83690 ASSAY OF LIPASE: CPT | Performed by: NURSE PRACTITIONER

## 2025-06-23 RX ORDER — KETOROLAC TROMETHAMINE 30 MG/ML
15 INJECTION, SOLUTION INTRAMUSCULAR; INTRAVENOUS
Status: COMPLETED | OUTPATIENT
Start: 2025-06-23 | End: 2025-06-23

## 2025-06-23 RX ORDER — DOCUSATE SODIUM 100 MG/1
100 CAPSULE, LIQUID FILLED ORAL 2 TIMES DAILY PRN
Qty: 14 CAPSULE | Refills: 0 | Status: SHIPPED | OUTPATIENT
Start: 2025-06-23 | End: 2025-06-30

## 2025-06-23 RX ORDER — POLYETHYLENE GLYCOL 3350 17 G/17G
17 POWDER, FOR SOLUTION ORAL DAILY
Qty: 7 EACH | Refills: 0 | Status: SHIPPED | OUTPATIENT
Start: 2025-06-23 | End: 2025-06-30

## 2025-06-23 RX ADMIN — KETOROLAC TROMETHAMINE 15 MG: 30 INJECTION, SOLUTION INTRAMUSCULAR at 08:06

## 2025-06-23 NOTE — Clinical Note
"Figueroa Downeylucas Murcia was seen and treated in our emergency department on 6/23/2025.  He may return to work on 06/25/2025.       If you have any questions or concerns, please don't hesitate to call.      Soraida Palma, SUHAIL"

## 2025-06-24 NOTE — DISCHARGE INSTRUCTIONS
Take medications as prescribed.  Take Colace nightly with 8 oz of water.  Increase water intake during the daytime.  Increase dietary fiber.

## 2025-06-24 NOTE — ED PROVIDER NOTES
Encounter Date: 6/23/2025       History     Chief Complaint   Patient presents with    Abdominal Pain     Abdominal pain (after eating) (x)1 week. Also states nausea and difficulty during bowel movements. Rates pain 9/10 at this time. Huy marie     35-year-old male presents with diffuse lower abdominal cramping type pain that began earlier today.  States has been having difficulty passing stool over the last week.  States stool has been small hard pieces.  Denies any fever, nausea/vomiting, blood in the stool, rectal pain when not passing stool, black stool, maroon stools, chest pain or shortness of breath.    The history is provided by the patient. No  was used.     Review of patient's allergies indicates:  No Known Allergies  Past Medical History:   Diagnosis Date    At risk for alteration in comfort     Mixed hyperlipidemia     Obesity, unspecified     Tobacco abuse      Past Surgical History:   Procedure Laterality Date    arm repair Right     LAPAROSCOPIC APPENDECTOMY N/A 10/22/2022    Procedure: APPENDECTOMY, LAPAROSCOPIC;  Surgeon: Vinay Sims Jr., MD;  Location: [facility];  Service: General;  Laterality: N/A;     Family History   Problem Relation Name Age of Onset    Hypertension Mother Taya     No Known Problems Father       Social History[1]  Review of Systems   Constitutional:  Negative for fever.   HENT:  Negative for sore throat.    Respiratory:  Negative for shortness of breath.    Cardiovascular:  Negative for chest pain.   Gastrointestinal:  Positive for abdominal distention, abdominal pain and constipation. Negative for blood in stool, diarrhea, nausea, rectal pain and vomiting.   Genitourinary:  Negative for dysuria.   Musculoskeletal:  Negative for back pain.   Skin:  Negative for rash.   Neurological:  Negative for weakness.   Hematological:  Does not bruise/bleed easily.       Physical Exam     Initial Vitals [06/23/25 1756]   BP Pulse Resp Temp SpO2   (!) 173/91 63 18  98.1 °F (36.7 °C) 98 %      MAP       --         Physical Exam    Nursing note and vitals reviewed.  Constitutional: He appears well-developed and well-nourished.   HENT:   Head: Normocephalic and atraumatic.   Eyes: EOM are normal. Pupils are equal, round, and reactive to light.   Neck: Neck supple.   Normal range of motion.  Cardiovascular:  Normal rate, regular rhythm, normal heart sounds and intact distal pulses.           Pulmonary/Chest: Breath sounds normal.   Abdominal: Bowel sounds are normal. He exhibits distension. He exhibits no mass. There is no abdominal tenderness. There is no rebound and no guarding.   Musculoskeletal:         General: Normal range of motion.      Cervical back: Normal range of motion and neck supple.     Neurological: He is alert and oriented to person, place, and time. He has normal strength and normal reflexes. GCS score is 15. GCS eye subscore is 4. GCS verbal subscore is 5. GCS motor subscore is 6.   Skin: Skin is warm and dry. Capillary refill takes less than 2 seconds.   Psychiatric: He has a normal mood and affect. His behavior is normal. Thought content normal.         ED Course   Procedures  Labs Reviewed   COMPREHENSIVE METABOLIC PANEL - Abnormal       Result Value    Sodium 140      Potassium 3.6      Chloride 109 (*)     CO2 24      Glucose 104 (*)     Blood Urea Nitrogen 8.4 (*)     Creatinine 1.02      Calcium 8.9      Protein Total 7.3      Albumin 3.6      Globulin 3.7 (*)     Albumin/Globulin Ratio 1.0 (*)     Bilirubin Total 0.7      ALP 70      ALT 21      AST 24      eGFR >60      Anion Gap 7.0      BUN/Creatinine Ratio 8     URINALYSIS, REFLEX TO URINE CULTURE - Abnormal    Color, UA Light-Yellow      Appearance, UA Clear      Specific Gravity, UA 1.019      pH, UA 6.5      Protein, UA Negative      Glucose, UA Normal      Ketones, UA Negative      Blood, UA Negative      Bilirubin, UA Negative      Urobilinogen, UA 1+ (*)     Nitrites, UA Negative       Leukocyte Esterase, UA Negative      RBC, UA 0-5      WBC, UA 0-5      Bacteria, UA None Seen      Squamous Epithelial Cells, UA Trace (*)     Mucous, UA Trace (*)     Hyaline Casts, UA None Seen     LIPASE - Normal    Lipase Level 43     CBC W/ AUTO DIFFERENTIAL    Narrative:     The following orders were created for panel order CBC W/ AUTO DIFFERENTIAL.  Procedure                               Abnormality         Status                     ---------                               -----------         ------                     CBC with Differential[[id number]]                           Final result                 Please view results for these tests on the individual orders.   CBC WITH DIFFERENTIAL    WBC 8.84      RBC 5.13      Hgb 14.9      Hct 43.4      MCV 84.6      MCH 29.0      MCHC 34.3      RDW 12.1      Platelet 215      MPV 10.2      Neut % 64.3      Lymph % 26.5      Mono % 5.8      Eos % 2.8      Basophil % 0.3      Imm Grans % 0.3      Neut # 5.68      Lymph # 2.34      Mono # 0.51      Eos # 0.25      Baso # 0.03      Imm Gran # 0.03      NRBC% 0.0     EXTRA TUBES    Narrative:     The following orders were created for panel order EXTRA TUBES.  Procedure                               Abnormality         Status                     ---------                               -----------         ------                     Light Blue Top Hold[[id number]]                             In process                 Red Top Hold[[id number]]                                    In process                 Light Green Top Hold[[id number]]                            In process                   Please view results for these tests on the individual orders.   LIGHT BLUE TOP HOLD   RED TOP HOLD   LIGHT GREEN TOP HOLD          Imaging Results              X-Ray Abdomen Flat And Erect (Final result)  Result time 06/23/25 19:46:01      Final result by Martell Tellez MD (06/23/25 19:46:01)                   Impression:       Nonspecific bowel gas pattern.      Electronically signed by: Martell Tellez  Date:    06/23/2025  Time:    19:46               Narrative:    EXAMINATION:  XR ABDOMEN FLAT AND ERECT    CLINICAL HISTORY:  Abdominal Pain;    TECHNIQUE:  Two views    COMPARISON:  None available    FINDINGS:  The intestinal gas pattern is nonspecific and nonobstructive. No air fluid levels or pneumoperitoneum identified.  Visualized portion of the lungs are clear.  There is lumbar dextroscoliotic curvature.                                       Medications   ketorolac injection 15 mg (has no administration in time range)     Medical Decision Making  Differentials:  Constipation   Diverticulosis  Abdominal pain  Colitis    Amount and/or Complexity of Data Reviewed  Labs: ordered.  Radiology: ordered.    Risk  OTC drugs.  Prescription drug management.               ED Course as of 06/23/25 2025 Mon Jun 23, 2025   2019 Given strict ED return precautions. I have spoken with the patient and/or caregivers. I have explained the patient's condition, diagnoses and treatment plan based on the information available to me at this time. I have answered the patient's and/or caregiver's questions and addressed any concerns. The patient and/or caregivers have as good an understanding of the patient's diagnosis, condition and treatment plan as can be expected at this point. The vital signs have been stable. The patient's condition is stable and appropriate for discharge from the emergency department.      The patient will pursue further outpatient evaluation with the primary care physician or other designated or consulting physician as outlined in the discharge instructions. The patient and/or caregivers are agreeable to this plan of care and follow-up instructions have been explained in detail. The patient and/or caregivers have received these instructions in written format and have expressed an understanding of the discharge instructions. The  patient and/or caregivers are aware that any significant change in condition or worsening of symptoms should prompt an immediate return to this or the closest emergency department or a call to 911.  [FS]      ED Course User Index  [FS] Soraida Palma FNP                           Clinical Impression:  Final diagnoses:  [K59.00] Constipation, unspecified constipation type (Primary)          ED Disposition Condition    Discharge Stable          ED Prescriptions       Medication Sig Dispense Start Date End Date Auth. Provider    docusate sodium (COLACE) 100 MG capsule Take 1 capsule (100 mg total) by mouth 2 (two) times daily as needed for Constipation. 14 capsule 6/23/2025 6/30/2025 Soraida Palma FNP    polyethylene glycol (GLYCOLAX) 17 gram PwPk Take 17 g by mouth once daily. for 7 days 7 each 6/23/2025 6/30/2025 Soraida Palma FNP          Follow-up Information       Follow up With Specialties Details Why Contact Aida Fink FNP Internal Medicine In 1 week  [address] [facility] [address] [phone]      [facility] Dept Emergency Medicine  If symptoms worsen [address] [facility] [address] [phone]                   [1]   Social History  Tobacco Use    Smoking status: Every Day     Current packs/day: 0.50     Average packs/day: 0.5 packs/day for 18.5 years (9.2 ttl pk-yrs)     Types: Cigarettes     Start date: 2007    Smokeless tobacco: Never   Substance Use Topics    Alcohol use: Not Currently     Comment: occasionally    Drug use: Not Currently     Types: Marijuana     Comment: 4 blunts per day        Soraida Palma FNP  06/23/25 2026

## 2025-08-21 ENCOUNTER — HOSPITAL ENCOUNTER (EMERGENCY)
Facility: HOSPITAL | Age: 35
Discharge: HOME | End: 2025-08-21
Attending: EMERGENCY MEDICINE
Payer: COMMERCIAL

## 2025-08-21 VITALS
OXYGEN SATURATION: 99 % | BODY MASS INDEX: 35.79 KG/M2 | RESPIRATION RATE: 20 BRPM | HEIGHT: 70 IN | WEIGHT: 250 LBS | SYSTOLIC BLOOD PRESSURE: 124 MMHG | HEART RATE: 76 BPM | TEMPERATURE: 98 F | DIASTOLIC BLOOD PRESSURE: 79 MMHG
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
## 2025-08-21 DIAGNOSIS — S39.012A BACK STRAIN, INITIAL ENCOUNTER: ICD-10-CM

## 2025-08-21 PROCEDURE — 99284 EMERGENCY DEPT VISIT MOD MDM: CPT

## 2025-08-21 RX ORDER — CYCLOBENZAPRINE HCL 10 MG
10 TABLET ORAL 3 TIMES DAILY PRN
Qty: 15 TABLET | Refills: 0 | Status: SHIPPED | OUTPATIENT
Start: 2025-08-21 | End: 2025-08-26

## 2025-08-21 RX ORDER — DICLOFENAC SODIUM 75 MG/1
75 TABLET, DELAYED RELEASE ORAL 2 TIMES DAILY
Qty: 28 TABLET | Refills: 0 | Status: SHIPPED | OUTPATIENT
Start: 2025-08-21 | End: 2025-09-04

## (undated) DEVICE — STAPLER INT LINEAR ARTC 3.5-45

## (undated) DEVICE — Device

## (undated) DEVICE — SYR 10CC LUER LOCK

## (undated) DEVICE — BAG TISS RETRV MONARCH 10MM

## (undated) DEVICE — TROCAR ENDOPATH XCEL 12X100MM

## (undated) DEVICE — CART STAPLE FLEX ETX 3.5MM BLU

## (undated) DEVICE — SUT MCRYL PLUS 4-0 PS2 27IN

## (undated) DEVICE — GOWN POLY REINF BRTH SLV XL

## (undated) DEVICE — NDL HYPO REG 25G X 1 1/2

## (undated) DEVICE — HANDLE DEVON RIGID OR LIGHT

## (undated) DEVICE — ADHESIVE DERMABOND ADVANCED

## (undated) DEVICE — GLOVE PROTEXIS BLUE LATEX 7

## (undated) DEVICE — GLOVE PROTEXIS BLUE LATEX 6.5

## (undated) DEVICE — TROCAR ENDOPATH ECEL

## (undated) DEVICE — CART STAPLE RELD 45MM WHT

## (undated) DEVICE — TROCAR ENDOPATH EXCEL DILATING

## (undated) DEVICE — NDL PNEUMO INSUFFLATI 120MM

## (undated) DEVICE — GLOVE PROTEXIS LTX MICRO 6

## (undated) DEVICE — SUT 0 VICRYL / UR6 (J603)

## (undated) DEVICE — APPLICATOR CHLORAPREP ORN 26ML

## (undated) DEVICE — GLOVE PROTEXIS LTX MICRO 6.5

## (undated) DEVICE — KIT LAPAROSCOPY UNIVERSITY

## (undated) DEVICE — SOL IRRI STRL WATER 1000ML

## (undated) DEVICE — GLOVE PROTEXIS PF LATEX 7.0

## (undated) DEVICE — NDL GRANEE OPEN LOOP GRASPER